# Patient Record
Sex: FEMALE | Race: WHITE | NOT HISPANIC OR LATINO | Employment: UNEMPLOYED | URBAN - METROPOLITAN AREA
[De-identification: names, ages, dates, MRNs, and addresses within clinical notes are randomized per-mention and may not be internally consistent; named-entity substitution may affect disease eponyms.]

---

## 2019-06-13 ENCOUNTER — TRANSCRIBE ORDERS (OUTPATIENT)
Dept: ADMINISTRATIVE | Facility: HOSPITAL | Age: 39
End: 2019-06-13

## 2019-06-13 DIAGNOSIS — Z12.39 SCREENING BREAST EXAMINATION: Primary | ICD-10-CM

## 2019-06-19 ENCOUNTER — APPOINTMENT (OUTPATIENT)
Dept: MAMMOGRAPHY | Facility: HOSPITAL | Age: 39
End: 2019-06-19

## 2019-06-26 ENCOUNTER — HOSPITAL ENCOUNTER (OUTPATIENT)
Dept: MAMMOGRAPHY | Facility: HOSPITAL | Age: 39
Discharge: HOME | End: 2019-06-26
Admitting: NURSE PRACTITIONER

## 2019-06-26 DIAGNOSIS — Z12.39 SCREENING BREAST EXAMINATION: ICD-10-CM

## 2019-06-26 PROCEDURE — 77067 SCR MAMMO BI INCL CAD: CPT

## 2019-06-26 PROCEDURE — 77063 BREAST TOMOSYNTHESIS BI: CPT

## 2019-07-18 ENCOUNTER — TELEPHONE (OUTPATIENT)
Dept: BARIATRICS/WEIGHT MGMT | Facility: CLINIC | Age: 39
End: 2019-07-18

## 2019-07-18 NOTE — TELEPHONE ENCOUNTER
I lm for pt to c/b @ [phone] to let us know where/who did her previous surgery so we can obtain her MR from that facility/provider, or she can call the facility and request her records be faxed to us at [phone].

## 2019-08-05 ENCOUNTER — PREP FOR SURGERY (OUTPATIENT)
Dept: OTHER | Facility: HOSPITAL | Age: 39
End: 2019-08-05

## 2019-08-05 ENCOUNTER — CONSULT (OUTPATIENT)
Dept: BARIATRICS/WEIGHT MGMT | Facility: CLINIC | Age: 39
End: 2019-08-05

## 2019-08-05 VITALS
HEART RATE: 62 BPM | SYSTOLIC BLOOD PRESSURE: 168 MMHG | DIASTOLIC BLOOD PRESSURE: 111 MMHG | WEIGHT: 293 LBS | TEMPERATURE: 97.7 F | HEIGHT: 67 IN | BODY MASS INDEX: 45.99 KG/M2

## 2019-08-05 DIAGNOSIS — E66.9 SUPER OBESE: Primary | ICD-10-CM

## 2019-08-05 PROCEDURE — 99205 OFFICE O/P NEW HI 60 MIN: CPT | Performed by: SURGERY

## 2019-08-05 RX ORDER — HYDROCHLOROTHIAZIDE 12.5 MG/1
25 CAPSULE, GELATIN COATED ORAL DAILY
COMMUNITY
End: 2019-08-12

## 2019-08-05 RX ORDER — LISINOPRIL AND HYDROCHLOROTHIAZIDE 25; 20 MG/1; MG/1
1 TABLET ORAL DAILY
Refills: 2 | COMMUNITY
Start: 2019-07-28 | End: 2021-04-21

## 2019-08-05 RX ORDER — FLUOXETINE HYDROCHLORIDE 40 MG/1
40 CAPSULE ORAL DAILY
Refills: 5 | COMMUNITY
Start: 2019-07-28 | End: 2020-12-02

## 2019-08-05 RX ORDER — GABAPENTIN 400 MG/1
400 CAPSULE ORAL
Refills: 2 | COMMUNITY
Start: 2019-07-28 | End: 2020-05-15

## 2019-08-05 RX ORDER — LURASIDONE HYDROCHLORIDE 80 MG/1
1 TABLET, FILM COATED ORAL
Refills: 1 | COMMUNITY
Start: 2019-07-01 | End: 2020-05-15

## 2019-08-05 RX ORDER — SODIUM CHLORIDE, SODIUM LACTATE, POTASSIUM CHLORIDE, CALCIUM CHLORIDE 600; 310; 30; 20 MG/100ML; MG/100ML; MG/100ML; MG/100ML
30 INJECTION, SOLUTION INTRAVENOUS CONTINUOUS
Status: CANCELLED | OUTPATIENT
Start: 2019-08-05

## 2019-08-05 NOTE — PROGRESS NOTES
Bariatric CONSULT      Patient Care Team:  Geeta Hathaway APRN as PCP - General (Nurse Practitioner)    Chief complaint abdominal pain and vomiting    Subjective  Weight regain after laparoscopic gastric band    This is a 39-year-old lady who had a LAP-BAND in 2012.  She had to have the port revised at one point due to some damage to the port.  She also got pregnant 2014 and all the fluid removed and right after pregnancy she regained a 75 pounds she has lost.  She vomits on a daily basis.  Today she weighs 355 pounds with a BMI 56.4.  She is interested in other weight loss options.  At the time of her LAP-BAND a high hernia was also repaired.  She has reflux and has to take antacids daily.  She works at [employer].  She has twins that are 4 years old    Review of Systems   All systems were reviewed and negative except for:  Gastrointestinal: positive for vomiting and some abdominal pain, refill  History  Past Medical History:   Diagnosis Date   • Breast injury    • Fibrocystic breast      No past surgical history on file.  No family history on file.  Social History     Tobacco Use   • Smoking status: Never Smoker   Substance Use Topics   • Alcohol use: No     Frequency: Never   • Drug use: No       (Not in a hospital admission)  Allergies:  Antihistamines, diphenhydramine-type    Objective     Vital Signs  Temp:  [97.7 °F (36.5 °C)] 97.7 °F (36.5 °C)  Heart Rate:  [62] 62  BP: (168)/(111) 168/111    Physical Exam:      General Appearance:    Alert, cooperative, in no acute distress   Head:    Normocephalic, without obvious abnormality, atraumatic, cutaneous horn of the left cheek   Eyes:            Lids and lashes normal, conjunctivae and sclerae normal, no   icterus, no pallor, corneas clear, PERRLA   Ears:    Ears appear intact with no abnormalities noted   Throat:   No oral lesions, no thrush, oral mucosa moist   Neck:   No adenopathy, supple, trachea midline, no thyromegaly, no   carotid bruit, no JVD    Back:     No kyphosis present, no scoliosis present, no skin lesions,      erythema or scars, no tenderness to percussion or                   palpation,   range of motion normal   Lungs:     Clear to auscultation,respirations regular, even and                  unlabored    Heart:    Regular rhythm and normal rate, normal S1 and S2, no            murmur, no gallop, no rub, no click   Chest Wall:    No abnormalities observed   Abdomen:     Normal bowel sounds, no masses, no organomegaly, soft        non-tender, non-distended, no guarding, no rebound                tenderness   Rectal:     Deferred   Extremities:  Left upper arm has decreased mobility due to some previous left elbow injury   Pulses:   Pulses palpable and equal bilaterally   Skin:   No bleeding, bruising or rash   Lymph nodes:   No palpable adenopathy   Neurologic:   Cranial nerves 2 - 12 grossly intact, sensation intact, DTR       present and equal bilaterally     Lab Results (last 24 hours)     ** No results found for the last 24 hours. **          Imaging Results (last 24 hours)     ** No results found for the last 24 hours. **          Results Review:    I reviewed the patient's new clinical results.  I reviewed the patient's new imaging results and agree with the interpretation.    Assessment/Plan       I discussed with her options of removing the band and placing an inguinal converting to a gastric sleeve or even gastric bypass.  I would like to first begin the evaluation with an EGD to rule out erosion or band slippage.      Lyn Gan MD  08/05/19  9:50 AM

## 2019-08-05 NOTE — H&P (VIEW-ONLY)
Bariatric CONSULT      Patient Care Team:  Geeta Hathaway APRN as PCP - General (Nurse Practitioner)    Chief complaint abdominal pain and vomiting    Subjective  Weight regain after laparoscopic gastric band    This is a 39-year-old lady who had a LAP-BAND in 2012.  She had to have the port revised at one point due to some damage to the port.  She also got pregnant 2014 and all the fluid removed and right after pregnancy she regained a 75 pounds she has lost.  She vomits on a daily basis.  Today she weighs 355 pounds with a BMI 56.4.  She is interested in other weight loss options.  At the time of her LAP-BAND a high hernia was also repaired.  She has reflux and has to take antacids daily.  She works at [employer].  She has twins that are 4 years old    Review of Systems   All systems were reviewed and negative except for:  Gastrointestinal: positive for vomiting and some abdominal pain, refill  History  Past Medical History:   Diagnosis Date   • Breast injury    • Fibrocystic breast      No past surgical history on file.  No family history on file.  Social History     Tobacco Use   • Smoking status: Never Smoker   Substance Use Topics   • Alcohol use: No     Frequency: Never   • Drug use: No       (Not in a hospital admission)  Allergies:  Antihistamines, diphenhydramine-type    Objective     Vital Signs  Temp:  [97.7 °F (36.5 °C)] 97.7 °F (36.5 °C)  Heart Rate:  [62] 62  BP: (168)/(111) 168/111    Physical Exam:      General Appearance:    Alert, cooperative, in no acute distress   Head:    Normocephalic, without obvious abnormality, atraumatic, cutaneous horn of the left cheek   Eyes:            Lids and lashes normal, conjunctivae and sclerae normal, no   icterus, no pallor, corneas clear, PERRLA   Ears:    Ears appear intact with no abnormalities noted   Throat:   No oral lesions, no thrush, oral mucosa moist   Neck:   No adenopathy, supple, trachea midline, no thyromegaly, no   carotid bruit, no JVD    Back:     No kyphosis present, no scoliosis present, no skin lesions,      erythema or scars, no tenderness to percussion or                   palpation,   range of motion normal   Lungs:     Clear to auscultation,respirations regular, even and                  unlabored    Heart:    Regular rhythm and normal rate, normal S1 and S2, no            murmur, no gallop, no rub, no click   Chest Wall:    No abnormalities observed   Abdomen:     Normal bowel sounds, no masses, no organomegaly, soft        non-tender, non-distended, no guarding, no rebound                tenderness   Rectal:     Deferred   Extremities:  Left upper arm has decreased mobility due to some previous left elbow injury   Pulses:   Pulses palpable and equal bilaterally   Skin:   No bleeding, bruising or rash   Lymph nodes:   No palpable adenopathy   Neurologic:   Cranial nerves 2 - 12 grossly intact, sensation intact, DTR       present and equal bilaterally     Lab Results (last 24 hours)     ** No results found for the last 24 hours. **          Imaging Results (last 24 hours)     ** No results found for the last 24 hours. **          Results Review:    I reviewed the patient's new clinical results.  I reviewed the patient's new imaging results and agree with the interpretation.    Assessment/Plan       I discussed with her options of removing the band and placing an inguinal converting to a gastric sleeve or even gastric bypass.  I would like to first begin the evaluation with an EGD to rule out erosion or band slippage.      Lyn Gan MD  08/05/19  9:50 AM

## 2019-08-07 PROBLEM — E66.9 SUPER OBESE: Status: ACTIVE | Noted: 2019-08-07

## 2019-08-14 ENCOUNTER — ANESTHESIA EVENT (OUTPATIENT)
Dept: GASTROENTEROLOGY | Facility: HOSPITAL | Age: 39
End: 2019-08-14

## 2019-08-15 ENCOUNTER — HOSPITAL ENCOUNTER (OUTPATIENT)
Facility: HOSPITAL | Age: 39
Setting detail: HOSPITAL OUTPATIENT SURGERY
Discharge: HOME | End: 2019-08-15
Attending: SURGERY | Admitting: SURGERY

## 2019-08-15 ENCOUNTER — ANESTHESIA (OUTPATIENT)
Dept: GASTROENTEROLOGY | Facility: HOSPITAL | Age: 39
End: 2019-08-15

## 2019-08-15 VITALS
RESPIRATION RATE: 17 BRPM | TEMPERATURE: 97 F | HEART RATE: 62 BPM | OXYGEN SATURATION: 100 % | WEIGHT: 293 LBS | HEIGHT: 67 IN | BODY MASS INDEX: 45.99 KG/M2 | DIASTOLIC BLOOD PRESSURE: 78 MMHG | SYSTOLIC BLOOD PRESSURE: 115 MMHG

## 2019-08-15 DIAGNOSIS — E66.9 SUPER OBESE: ICD-10-CM

## 2019-08-15 PROCEDURE — 43239 EGD BIOPSY SINGLE/MULTIPLE: CPT | Performed by: SURGERY

## 2019-08-15 PROCEDURE — 88305 TISSUE EXAM BY PATHOLOGIST: CPT | Performed by: SURGERY

## 2019-08-15 PROCEDURE — 25010000002 PROPOFOL 10 MG/ML EMULSION: Performed by: ANESTHESIOLOGY

## 2019-08-15 RX ORDER — SODIUM CHLORIDE 0.9 % (FLUSH) 0.9 %
3-10 SYRINGE (ML) INJECTION AS NEEDED
Status: DISCONTINUED | OUTPATIENT
Start: 2019-08-15 | End: 2019-08-15 | Stop reason: HOSPADM

## 2019-08-15 RX ORDER — ONDANSETRON 2 MG/ML
4 INJECTION INTRAMUSCULAR; INTRAVENOUS ONCE AS NEEDED
Status: DISCONTINUED | OUTPATIENT
Start: 2019-08-15 | End: 2019-08-15 | Stop reason: HOSPADM

## 2019-08-15 RX ORDER — SODIUM CHLORIDE 9 MG/ML
9 INJECTION, SOLUTION INTRAVENOUS CONTINUOUS PRN
Status: DISCONTINUED | OUTPATIENT
Start: 2019-08-15 | End: 2019-08-15

## 2019-08-15 RX ORDER — SODIUM CHLORIDE, SODIUM LACTATE, POTASSIUM CHLORIDE, CALCIUM CHLORIDE 600; 310; 30; 20 MG/100ML; MG/100ML; MG/100ML; MG/100ML
30 INJECTION, SOLUTION INTRAVENOUS CONTINUOUS
Status: DISCONTINUED | OUTPATIENT
Start: 2019-08-15 | End: 2019-08-15 | Stop reason: HOSPADM

## 2019-08-15 RX ORDER — PROPOFOL 10 MG/ML
VIAL (ML) INTRAVENOUS AS NEEDED
Status: DISCONTINUED | OUTPATIENT
Start: 2019-08-15 | End: 2019-08-15 | Stop reason: SURG

## 2019-08-15 RX ORDER — SODIUM CHLORIDE 0.9 % (FLUSH) 0.9 %
3 SYRINGE (ML) INJECTION EVERY 12 HOURS SCHEDULED
Status: DISCONTINUED | OUTPATIENT
Start: 2019-08-15 | End: 2019-08-15

## 2019-08-15 RX ORDER — SODIUM CHLORIDE 0.9 % (FLUSH) 0.9 %
3 SYRINGE (ML) INJECTION EVERY 12 HOURS SCHEDULED
Status: DISCONTINUED | OUTPATIENT
Start: 2019-08-15 | End: 2019-08-15 | Stop reason: HOSPADM

## 2019-08-15 RX ORDER — PROMETHAZINE HYDROCHLORIDE 25 MG/1
25 SUPPOSITORY RECTAL ONCE AS NEEDED
Status: DISCONTINUED | OUTPATIENT
Start: 2019-08-15 | End: 2019-08-15 | Stop reason: HOSPADM

## 2019-08-15 RX ORDER — SODIUM CHLORIDE 0.9 % (FLUSH) 0.9 %
3-10 SYRINGE (ML) INJECTION AS NEEDED
Status: DISCONTINUED | OUTPATIENT
Start: 2019-08-15 | End: 2019-08-15

## 2019-08-15 RX ORDER — LIDOCAINE HYDROCHLORIDE 10 MG/ML
INJECTION, SOLUTION EPIDURAL; INFILTRATION; INTRACAUDAL; PERINEURAL AS NEEDED
Status: DISCONTINUED | OUTPATIENT
Start: 2019-08-15 | End: 2019-08-15 | Stop reason: SURG

## 2019-08-15 RX ORDER — PROMETHAZINE HYDROCHLORIDE 25 MG/1
25 TABLET ORAL ONCE AS NEEDED
Status: DISCONTINUED | OUTPATIENT
Start: 2019-08-15 | End: 2019-08-15 | Stop reason: HOSPADM

## 2019-08-15 RX ORDER — SODIUM CHLORIDE 9 MG/ML
9 INJECTION, SOLUTION INTRAVENOUS CONTINUOUS PRN
Status: DISCONTINUED | OUTPATIENT
Start: 2019-08-15 | End: 2019-08-15 | Stop reason: HOSPADM

## 2019-08-15 RX ORDER — PROMETHAZINE HYDROCHLORIDE 25 MG/ML
6.25 INJECTION, SOLUTION INTRAMUSCULAR; INTRAVENOUS ONCE AS NEEDED
Status: DISCONTINUED | OUTPATIENT
Start: 2019-08-15 | End: 2019-08-15 | Stop reason: HOSPADM

## 2019-08-15 RX ADMIN — SODIUM CHLORIDE 9 ML/HR: 0.9 INJECTION, SOLUTION INTRAVENOUS at 12:03

## 2019-08-15 RX ADMIN — PROPOFOL 140 MG: 10 INJECTION, EMULSION INTRAVENOUS at 13:05

## 2019-08-15 RX ADMIN — LIDOCAINE HYDROCHLORIDE 50 MG: 10 INJECTION, SOLUTION EPIDURAL; INFILTRATION; INTRACAUDAL; PERINEURAL at 13:05

## 2019-08-15 RX ADMIN — SODIUM CHLORIDE: 0.9 INJECTION, SOLUTION INTRAVENOUS at 13:00

## 2019-08-15 NOTE — OP NOTE
Surgeon:  Lyn Gan MD  Preoperative Diagnosis: Screening for bariatric surgery    Postoperative Diagnosis: small hiatal hernia    Procedure Performed: Esophagogastroduodenoscopy with biopsy of the antrum to check for H. pylori    Indications: The patient is interested in bariatric surgery for weight loss.  This is a screening EGD.    Procedure:     The procedure, indications, preparation and potential complications were explained to the patient, who indicated understanding and signed the corresponding consent forms.  The patient was identified, taken to the endoscopy suite, and placed on the left side down decubitus position.  The patient underwent a MAC anesthesia and was appropriately monitored through the case by the anesthesia personnel with continuous pulse oximetry, blood pressure, and cardiac monitoring.  A bite block was placed.  After adequate IV sedation and using a transoral technique a lubed flexible endoscope was placed in the hypopharynx and advanced to the second portion of the duodenum without difficulty. The scope was then withdrawn back into the antrum of the stomach.  Cold forcep biopsies of the antrum were taken to rule out Helicobacter pylori.  The scope was retroflexed noting the body, fundus and cardia.  The scope was then withdrawn back into the esophagus after decompressing the stomach.  The Z line was noted and GE junction measured from the incisors.  The scope was then completely withdrawn.  The patient tolerated the procedure well and left the endoscopy suite in stable condition.  The findings are listed below.    Small hiatal hernia seen

## 2019-08-15 NOTE — DISCHARGE INSTRUCTIONS
A responsible adult should stay with you and you should rest quietly for the rest of the day.    Do not drink alcohol, drive, operate any heavy machinery or power tools or make any legal/important decisions for the next 24 hours.     Progress your diet as tolerated.  If you begin to experience severe pain, increased shortness of breath, racing heartbeat or a fever above 101 F, seek immediate medical attention. Follow up with MD as instructed.

## 2019-08-15 NOTE — ANESTHESIA POSTPROCEDURE EVALUATION
Patient: Kyle Pennington    Procedure Summary     Date:  08/15/19 Room / Location:  [facility] 3 / [facility]    Anesthesia Start:  1300 Anesthesia Stop:  1316    Procedure:  ESOPHAGOGASTRODUODENOSCOPY W/BIOPSY (N/A ) Diagnosis:       Super obese      (Super obese [E66.9])    Surgeon:  Lyn Gan MD Provider:  Bimal Castillo MD    Anesthesia Type:  MAC ASA Status:  3          Anesthesia Type: MAC  Last vitals  BP   115/78 (08/15/19 1403)   Temp   97 °F (36.1 °C) (08/15/19 1204)   Pulse   62 (08/15/19 1403)   Resp   17 (08/15/19 1403)     SpO2   100 % (08/15/19 1403)     Post Anesthesia Care and Evaluation    Patient location during evaluation: PHASE II  Patient participation: complete - patient participated  Level of consciousness: awake  Pain scale: See nurse's notes for pain score.  Pain management: adequate  Airway patency: patent  Anesthetic complications: No anesthetic complications  PONV Status: none  Cardiovascular status: acceptable  Respiratory status: acceptable  Hydration status: acceptable    Comments: Patient seen and examined postoperatively; vital signs stable; SpO2 greater than or equal to 90%; cardiopulmonary status stable; nausea/vomiting adequately controlled; pain adequately controlled; no apparent anesthesia complications; patient discharged from anesthesia care when discharge criteria were met

## 2019-08-15 NOTE — ANESTHESIA PREPROCEDURE EVALUATION
Anesthesia Evaluation     Patient summary reviewed and Nursing notes reviewed   NPO Solid Status: > 8 hours  NPO Liquid Status: > 8 hours           Airway   Mallampati: III  TM distance: >3 FB  Neck ROM: full  Possible difficult intubation  Dental      Pulmonary - normal exam   Cardiovascular - normal exam    (+) hypertension,       Neuro/Psych  GI/Hepatic/Renal/Endo    (+) morbid obesity,      Musculoskeletal     Abdominal   (+) obese,    Substance History      OB/GYN          Other                        Anesthesia Plan    ASA 3     MAC     intravenous induction   Anesthetic plan, all risks, benefits, and alternatives have been provided, discussed and informed consent has been obtained with: patient.

## 2019-08-16 LAB
LAB AP CASE REPORT: NORMAL
PATH REPORT.FINAL DX SPEC: NORMAL
PATH REPORT.GROSS SPEC: NORMAL

## 2019-09-19 ENCOUNTER — PREP FOR SURGERY (OUTPATIENT)
Dept: OTHER | Facility: HOSPITAL | Age: 39
End: 2019-09-19

## 2019-09-19 DIAGNOSIS — E66.9 SUPER OBESE: Primary | ICD-10-CM

## 2019-09-19 RX ORDER — SODIUM CHLORIDE 0.9 % (FLUSH) 0.9 %
3 SYRINGE (ML) INJECTION EVERY 12 HOURS SCHEDULED
Status: CANCELLED | OUTPATIENT
Start: 2019-09-19

## 2019-09-19 RX ORDER — CHLORHEXIDINE GLUCONATE 0.12 MG/ML
15 RINSE ORAL
Status: CANCELLED | OUTPATIENT
Start: 2019-09-19 | End: 2019-09-19

## 2019-09-19 RX ORDER — SODIUM CHLORIDE 0.9 % (FLUSH) 0.9 %
1-10 SYRINGE (ML) INJECTION AS NEEDED
Status: CANCELLED | OUTPATIENT
Start: 2019-09-19

## 2019-09-19 RX ORDER — SCOLOPAMINE TRANSDERMAL SYSTEM 1 MG/1
1 PATCH, EXTENDED RELEASE TRANSDERMAL ONCE
Status: CANCELLED | OUTPATIENT
Start: 2019-09-19 | End: 2019-09-19

## 2019-09-19 RX ORDER — ACETAMINOPHEN 160 MG/5ML
975 SOLUTION ORAL ONCE
Status: CANCELLED | OUTPATIENT
Start: 2019-09-19 | End: 2019-09-19

## 2019-09-19 RX ORDER — CEFAZOLIN SODIUM IN 0.9 % NACL 3 G/100 ML
3 INTRAVENOUS SOLUTION, PIGGYBACK (ML) INTRAVENOUS
Status: CANCELLED | OUTPATIENT
Start: 2019-09-19

## 2019-09-19 RX ORDER — PANTOPRAZOLE SODIUM 40 MG/10ML
40 INJECTION, POWDER, LYOPHILIZED, FOR SOLUTION INTRAVENOUS ONCE
Status: CANCELLED | OUTPATIENT
Start: 2019-09-19 | End: 2019-09-19

## 2019-09-19 RX ORDER — SODIUM CHLORIDE, SODIUM LACTATE, POTASSIUM CHLORIDE, CALCIUM CHLORIDE 600; 310; 30; 20 MG/100ML; MG/100ML; MG/100ML; MG/100ML
100 INJECTION, SOLUTION INTRAVENOUS CONTINUOUS
Status: CANCELLED | OUTPATIENT
Start: 2019-09-19

## 2019-09-25 ENCOUNTER — APPOINTMENT (OUTPATIENT)
Dept: GENERAL RADIOLOGY | Facility: HOSPITAL | Age: 39
End: 2019-09-25

## 2019-09-25 ENCOUNTER — HOSPITAL ENCOUNTER (EMERGENCY)
Facility: HOSPITAL | Age: 39
Discharge: HOME | End: 2019-09-25
Admitting: EMERGENCY MEDICINE

## 2019-09-25 VITALS
HEIGHT: 66 IN | TEMPERATURE: 98.2 F | DIASTOLIC BLOOD PRESSURE: 83 MMHG | HEART RATE: 64 BPM | BODY MASS INDEX: 47.09 KG/M2 | RESPIRATION RATE: 16 BRPM | SYSTOLIC BLOOD PRESSURE: 123 MMHG | WEIGHT: 293 LBS | OXYGEN SATURATION: 100 %

## 2019-09-25 DIAGNOSIS — M25.552 LEFT HIP PAIN: Primary | ICD-10-CM

## 2019-09-25 LAB
B-HCG UR QL: NEGATIVE
BACTERIA UR QL AUTO: ABNORMAL /HPF
BILIRUB UR QL STRIP: NEGATIVE
CLARITY UR: ABNORMAL
COLOR UR: YELLOW
GLUCOSE UR STRIP-MCNC: NEGATIVE MG/DL
HGB UR QL STRIP.AUTO: ABNORMAL
HYALINE CASTS UR QL AUTO: ABNORMAL /LPF
KETONES UR QL STRIP: NEGATIVE
LEUKOCYTE ESTERASE UR QL STRIP.AUTO: NEGATIVE
NITRITE UR QL STRIP: NEGATIVE
PH UR STRIP.AUTO: 5.5 [PH] (ref 5–8)
PROT UR QL STRIP: NEGATIVE
RBC # UR: ABNORMAL /HPF
REF LAB TEST METHOD: ABNORMAL
SP GR UR STRIP: 1.01 (ref 1–1.03)
SQUAMOUS #/AREA URNS HPF: ABNORMAL /HPF
UROBILINOGEN UR QL STRIP: ABNORMAL
WBC UR QL AUTO: ABNORMAL /HPF

## 2019-09-25 PROCEDURE — 25010000002 KETOROLAC TROMETHAMINE PER 15 MG: Performed by: NURSE PRACTITIONER

## 2019-09-25 PROCEDURE — 73502 X-RAY EXAM HIP UNI 2-3 VIEWS: CPT

## 2019-09-25 PROCEDURE — 99283 EMERGENCY DEPT VISIT LOW MDM: CPT

## 2019-09-25 PROCEDURE — 81001 URINALYSIS AUTO W/SCOPE: CPT | Performed by: NURSE PRACTITIONER

## 2019-09-25 PROCEDURE — 96372 THER/PROPH/DIAG INJ SC/IM: CPT

## 2019-09-25 PROCEDURE — 25010000002 METHYLPREDNISOLONE PER 125 MG: Performed by: NURSE PRACTITIONER

## 2019-09-25 PROCEDURE — 81025 URINE PREGNANCY TEST: CPT | Performed by: NURSE PRACTITIONER

## 2019-09-25 RX ORDER — KETOROLAC TROMETHAMINE 30 MG/ML
30 INJECTION, SOLUTION INTRAMUSCULAR; INTRAVENOUS ONCE
Status: COMPLETED | OUTPATIENT
Start: 2019-09-25 | End: 2019-09-25

## 2019-09-25 RX ORDER — PREDNISONE 20 MG/1
40 TABLET ORAL DAILY
Qty: 10 TABLET | Refills: 0 | Status: SHIPPED | OUTPATIENT
Start: 2019-09-25 | End: 2019-09-30

## 2019-09-25 RX ORDER — HYDROCODONE BITARTRATE AND ACETAMINOPHEN 5; 325 MG/1; MG/1
1 TABLET ORAL EVERY 6 HOURS PRN
Qty: 8 TABLET | Refills: 0 | Status: SHIPPED | OUTPATIENT
Start: 2019-09-25 | End: 2019-10-07

## 2019-09-25 RX ORDER — METHYLPREDNISOLONE SODIUM SUCCINATE 125 MG/2ML
80 INJECTION, POWDER, LYOPHILIZED, FOR SOLUTION INTRAMUSCULAR; INTRAVENOUS ONCE
Status: COMPLETED | OUTPATIENT
Start: 2019-09-25 | End: 2019-09-25

## 2019-09-25 RX ORDER — METHOCARBAMOL 750 MG/1
750 TABLET, FILM COATED ORAL ONCE
Status: COMPLETED | OUTPATIENT
Start: 2019-09-25 | End: 2019-09-25

## 2019-09-25 RX ORDER — METHOCARBAMOL 750 MG/1
750 TABLET, FILM COATED ORAL 3 TIMES DAILY
Qty: 30 TABLET | Refills: 0 | Status: SHIPPED | OUTPATIENT
Start: 2019-09-25 | End: 2020-09-01 | Stop reason: HOSPADM

## 2019-09-25 RX ADMIN — METHOCARBAMOL 750 MG: 750 TABLET, FILM COATED ORAL at 14:24

## 2019-09-25 RX ADMIN — KETOROLAC TROMETHAMINE 30 MG: 30 INJECTION, SOLUTION INTRAMUSCULAR at 14:23

## 2019-09-25 RX ADMIN — METHYLPREDNISOLONE SODIUM SUCCINATE 80 MG: 125 INJECTION, POWDER, FOR SOLUTION INTRAMUSCULAR; INTRAVENOUS at 14:24

## 2019-09-25 NOTE — ED PROVIDER NOTES
Subjective   Context: 39-year-old female patient presents the ER with complaint of pain to her left hip and mid buttock; patient reports onset of pain that began 3 days ago upon awakening; she describes it as an aching pain that radiates down her entire leg.  She reports no trauma, denies edema, she reports no muscular calf tenderness.  She denies urinary symptoms.  She reports that she has chronic pain to the middle part of her lower back.  Reports no distal numbness or tingling      Location: Left leg  Quality: aching  Timing: 3 days  Duration: constant  Aggravating:weightbearing  Alleviating:nothing reported      LNMP:  6 mo since last prior, began today            Review of Systems   Constitutional: Negative.    HENT: Negative.    Respiratory: Negative.    Cardiovascular: Negative.  Negative for leg swelling.   Gastrointestinal: Negative.    Genitourinary: Negative for decreased urine volume, difficulty urinating, dyspareunia, menstrual problem, pelvic pain and urgency.   Musculoskeletal: Positive for arthralgias and gait problem. Negative for joint swelling and myalgias.        Pain with ambulation    L hip pain   Skin: Negative.    Neurological: Negative for tremors, weakness and numbness.     Prior to Admission medications    Medication Sig Start Date End Date Taking? Authorizing Provider   FLUoxetine (PROzac) 40 MG capsule Take 40 mg by mouth Daily. 7/28/19   Darrell Tuttle MD   gabapentin (NEURONTIN) 400 MG capsule Take 400 mg by mouth every night at bedtime. 7/28/19   Darrell Tuttle MD   LATUDA 80 MG tablet Take 1 tablet by mouth every night at bedtime. 7/1/19   Darrell Tuttle MD   lisinopril-hydrochlorothiazide (PRINZIDE,ZESTORETIC) 20-12.5 MG per tablet Take 1 tablet by mouth Daily. for blood pressure. 7/28/19   Darrell Tuttle MD         Past Medical History:   Diagnosis Date   • Anxiety and depression    • Breast injury    • Fibrocystic breast    • Hypertension    • Morbid  obesity (CMS/HCC)        Allergies   Allergen Reactions   • Antihistamines, Diphenhydramine-Type Other (See Comments)     Bloody nose       Past Surgical History:   Procedure Laterality Date   • ABDOMINAL SURGERY     • ENDOSCOPY N/A 8/15/2019    Procedure: ESOPHAGOGASTRODUODENOSCOPY W/BIOPSY;  Surgeon: Lyn Gan MD;  Location: [facility] ENDOSCOPY;  Service: General   • LAPAROSCOPIC GASTRIC BANDING     • LAPAROSCOPIC TUBAL LIGATION         No family history on file.    Social History     Socioeconomic History   • Marital status:      Spouse name: Not on file   • Number of children: Not on file   • Years of education: Not on file   • Highest education level: Not on file   Tobacco Use   • Smoking status: Never Smoker   Substance and Sexual Activity   • Alcohol use: No     Frequency: Never   • Drug use: No   • Sexual activity: Defer           Objective   Physical Exam       Vital signs and triage nurse note reviewed.   Constitutional: Awake, alert; overly obese but healthy appearing 39-year-old female patient who is well-developed and well-nourished. No acute distress is noted.   HEENT: Normocephalic, atraumatic; pupils are PERRL with intact EOM; oropharynx is pink and moist without exudate or erythema.   Neck: Supple, full range of motion without pain; no cervical lymphadenopathy.   Cardiovascular: Regular rate and rhythm, normal S1-S2.   Pulmonary: Respiratory effort regular nonlabored, breath sounds clear to auscultation all fields.   Abdomen: Soft, nontender nondistended with normoactive bowel sounds; no rebound or guarding.   Musculoskeletal: Independent range of motion of all extremities with no palpable joint tenderness or edema.  Tenderness with palpation to the upper left buttock as well as the lateral left hip, no length discrepancy, tenderness continues down the lateral aspect of the leg with no overlying erythema or ecchymosis; calves are soft, nontender with negative Homans sign bilaterally   Neuro:  "Alert oriented x3, speech is clear and appropriate, GCS 15: Ambulatory with an upright steady gait; DTRs difficult to elicit bilaterally but symmetrical when elicited, Babinski negative   Skin:  Fleshtone warm, dry, intact; no erythematous or petechial rash or lesion    Procedures           ED Course        The most important  Results for orders placed or performed during the hospital encounter of 09/25/19   Pregnancy, Urine - Urine, Clean Catch   Result Value Ref Range    HCG, Urine QL Negative Negative   Urinalysis With Microscopic If Indicated (No Culture) - Urine, Clean Catch   Result Value Ref Range    Color, UA Yellow Yellow, Straw    Appearance, UA Hazy (A) Clear    pH, UA 5.5 5.0 - 8.0    Specific Gravity, UA 1.012 1.005 - 1.030    Glucose, UA Negative Negative    Ketones, UA Negative Negative    Bilirubin, UA Negative Negative    Blood, UA Large (3+) (A) Negative    Protein, UA Negative Negative    Leuk Esterase, UA Negative Negative    Nitrite, UA Negative Negative    Urobilinogen, UA 0.2 E.U./dL 0.2 - 1.0 E.U./dL   Urinalysis, Microscopic Only - Urine, Clean Catch   Result Value Ref Range    RBC, UA 0-2 (A) None Seen /HPF    WBC, UA 0-2 (A) None Seen /HPF    Bacteria, UA None Seen None Seen /HPF    Squamous Epithelial Cells, UA 0-2 None Seen, 0-2 /HPF    Hyaline Casts, UA None Seen None Seen /LPF    Methodology Automated Microscopy      Medications   ketorolac (TORADOL) injection 30 mg (30 mg Intramuscular Given 9/25/19 1423)   methylPREDNISolone sodium succinate (SOLU-Medrol) injection 80 mg (80 mg Intramuscular Given 9/25/19 1424)   methocarbamol (ROBAXIN) tablet 750 mg (750 mg Oral Given 9/25/19 1424)     /86 (BP Location: Left arm, Patient Position: Sitting)   Pulse 65   Temp 98.1 °F (36.7 °C) (Oral)   Resp 15   Ht 167.6 cm (66\")   Wt (!) 159 kg (349 lb 6.9 oz)   SpO2 97%   BMI 56.40 kg/m²   [id number]          MDM  Number of Diagnoses or Management Options     Amount and/or Complexity " "of Data Reviewed  Clinical lab tests: ordered and reviewed  Tests in the radiology section of CPT®: ordered and reviewed  Review and summarize past medical records: yes (No prior records pertinent for this visit)    Risk of Complications, Morbidity, and/or Mortality  General comments: Comorbidities:  Past medical history, allergies, current medications family history social history noted above    Previous records reviewed:    Review and summarization of lab specimens, radiology:  ED tests reviewed by me    Consultation:    Differentials: Arthritis, osteoarthritis, pathological fracture, radiculopathy, strain, sprain; this list is not all inclusive and does not constitute the entirety of considered causes        Wells' Criteria for DVT from [email or web address]  on 9/25/2019  ** All calculations should be rechecked by clinician prior to use **    RESULT SUMMARY:  0 points  Low risk group for DVT. \"Unlikely\" according to Adalberto' DVT studies.      INPUTS:  Active cancer -> 0 = No  Bedridden recently >3 days or major surgery within 12 weeks -> 0 = No  Calf swelling >3 cm compared to the other leg -> 0 = No  Collateral (nonvaricose) superficial veins present -> 0 = No  Entire leg swollen -> 0 = No  Localized tenderness along the deep venous system -> 0 = No  Pitting edema, confined to symptomatic leg -> 0 = No  Paralysis, paresis, or recent plaster immobilization of the lower extremity -> 0 = No  Previously documented DVT -> 0 = No  Alternative diagnosis to DVT as likely or more likely -> 0 = No        Repeat examination, patient was ambulatory to the room with an upright gait; she reports poor improvement with her pain.  Distal motor sensory vascular status remains intact.  Findings reviewed recommendation made for follow-up with her primary care, orthopedic evaluation will be provided, the patient is aware that persistent pain may require repeat radiology evaluation including MRI or CT; I reviewed signs and symptoms require " immediate return to ED she voiced understanding.  Patient is discharged improved stable condition ambulatory with an upright steady gait.    INSPECT report reviewed, significant for prescribed gabapentin      Final diagnoses:   Left hip pain              Rachael Ball, NP  09/25/19 153

## 2019-09-25 NOTE — DISCHARGE INSTRUCTIONS
New medications as directed; opiate medications should be used for moderate to severe pain only, there is a risk of addiction with continued and inappropriate use; additionally, opiate medications may cause constipation, please use stool softener and ensure adequate fluid intake while taking.  Impairment can occur, no alcohol, other sedative medications, operating motor vehicle or other machinery, swimming alone or other dangerous activity while taking    Use cane to the affected side for assistance with pain and partial nonweightbearing.    Follow-up with primary care physician and with orthopedic referral above for further evaluation, repeat radiology studies may be necessary for complete diagnosis

## 2019-10-10 ENCOUNTER — APPOINTMENT (OUTPATIENT)
Dept: PREADMISSION TESTING | Facility: HOSPITAL | Age: 39
End: 2019-10-10

## 2019-10-10 VITALS
HEART RATE: 63 BPM | WEIGHT: 293 LBS | HEIGHT: 67 IN | OXYGEN SATURATION: 100 % | SYSTOLIC BLOOD PRESSURE: 140 MMHG | BODY MASS INDEX: 45.99 KG/M2 | DIASTOLIC BLOOD PRESSURE: 80 MMHG

## 2019-10-10 DIAGNOSIS — E66.9 SUPER OBESE: ICD-10-CM

## 2019-10-10 LAB
ABO GROUP BLD: NORMAL
ALBUMIN SERPL-MCNC: 3.5 G/DL (ref 3.5–4.8)
ALBUMIN/GLOB SERPL: 1.3 G/DL (ref 1–1.7)
ALP SERPL-CCNC: 74 U/L (ref 32–91)
ALT SERPL W P-5'-P-CCNC: 32 U/L (ref 14–54)
ANION GAP SERPL CALCULATED.3IONS-SCNC: 12.4 MMOL/L (ref 5–15)
AST SERPL-CCNC: 21 U/L (ref 15–41)
BILIRUB SERPL-MCNC: 0.5 MG/DL (ref 0.3–1.2)
BLD GP AB SCN SERPL QL: NEGATIVE
BUN BLD-MCNC: 14 MG/DL (ref 8–20)
BUN/CREAT SERPL: 20 (ref 5.4–26.2)
CALCIUM SPEC-SCNC: 9.2 MG/DL (ref 8.9–10.3)
CHLORIDE SERPL-SCNC: 104 MMOL/L (ref 101–111)
CO2 SERPL-SCNC: 28 MMOL/L (ref 22–32)
CREAT BLD-MCNC: 0.7 MG/DL (ref 0.4–1)
DEPRECATED RDW RBC AUTO: 47.3 FL (ref 37–54)
ERYTHROCYTE [DISTWIDTH] IN BLOOD BY AUTOMATED COUNT: 19.9 % (ref 12.3–15.4)
GFR SERPL CREATININE-BSD FRML MDRD: 93 ML/MIN/1.73
GLOBULIN UR ELPH-MCNC: 2.6 GM/DL (ref 2.5–3.8)
GLUCOSE BLD-MCNC: 93 MG/DL (ref 65–99)
HCT VFR BLD AUTO: 33.4 % (ref 34–46.6)
HGB BLD-MCNC: 10.4 G/DL (ref 12–15.9)
MCH RBC QN AUTO: 20.7 PG (ref 26.6–33)
MCHC RBC AUTO-ENTMCNC: 31 G/DL (ref 31.5–35.7)
MCV RBC AUTO: 66.8 FL (ref 79–97)
PLATELET # BLD AUTO: 254 10*3/MM3 (ref 140–450)
PMV BLD AUTO: 8.4 FL (ref 6–12)
POTASSIUM BLD-SCNC: 4.4 MMOL/L (ref 3.6–5.1)
PROT SERPL-MCNC: 6.1 G/DL (ref 6.1–7.9)
RBC # BLD AUTO: 5 10*6/MM3 (ref 3.77–5.28)
RH BLD: POSITIVE
SODIUM BLD-SCNC: 140 MMOL/L (ref 136–144)
T&S EXPIRATION DATE: NORMAL
WBC NRBC COR # BLD: 5.8 10*3/MM3 (ref 3.4–10.8)

## 2019-10-10 PROCEDURE — 86900 BLOOD TYPING SEROLOGIC ABO: CPT | Performed by: SURGERY

## 2019-10-10 PROCEDURE — 80053 COMPREHEN METABOLIC PANEL: CPT | Performed by: SURGERY

## 2019-10-10 PROCEDURE — 85027 COMPLETE CBC AUTOMATED: CPT | Performed by: SURGERY

## 2019-10-10 PROCEDURE — 86850 RBC ANTIBODY SCREEN: CPT | Performed by: SURGERY

## 2019-10-10 PROCEDURE — 86901 BLOOD TYPING SEROLOGIC RH(D): CPT

## 2019-10-10 PROCEDURE — 87081 CULTURE SCREEN ONLY: CPT | Performed by: SURGERY

## 2019-10-10 PROCEDURE — 93005 ELECTROCARDIOGRAM TRACING: CPT

## 2019-10-10 PROCEDURE — 86901 BLOOD TYPING SEROLOGIC RH(D): CPT | Performed by: SURGERY

## 2019-10-10 PROCEDURE — 36415 COLL VENOUS BLD VENIPUNCTURE: CPT

## 2019-10-10 PROCEDURE — 86900 BLOOD TYPING SEROLOGIC ABO: CPT

## 2019-10-10 RX ORDER — OMEPRAZOLE 20 MG/1
20 CAPSULE, DELAYED RELEASE ORAL DAILY PRN
COMMUNITY
End: 2020-09-01 | Stop reason: HOSPADM

## 2019-10-10 RX ORDER — IBUPROFEN 200 MG
800 TABLET ORAL EVERY 6 HOURS PRN
COMMUNITY
End: 2021-10-29

## 2019-10-10 NOTE — DISCHARGE INSTRUCTIONS
Hold Ibuprofen and Multlivitamin 1 week prior to surgery,  OK to take Fluxotene, omoeprazole and Latuda day of surgery         Hold Lisinopril and Robaxin day of surgery

## 2019-10-11 LAB — MRSA SPEC QL CULT: NORMAL

## 2019-10-11 PROCEDURE — 93010 ELECTROCARDIOGRAM REPORT: CPT | Performed by: INTERNAL MEDICINE

## 2019-10-16 ENCOUNTER — ANESTHESIA EVENT (OUTPATIENT)
Dept: PERIOP | Facility: HOSPITAL | Age: 39
End: 2019-10-16

## 2019-10-17 ENCOUNTER — ANESTHESIA (OUTPATIENT)
Dept: PERIOP | Facility: HOSPITAL | Age: 39
End: 2019-10-17

## 2019-10-17 ENCOUNTER — HOSPITAL ENCOUNTER (OUTPATIENT)
Facility: HOSPITAL | Age: 39
Setting detail: HOSPITAL OUTPATIENT SURGERY
Discharge: HOME | End: 2019-10-17
Attending: SURGERY | Admitting: SURGERY

## 2019-10-17 VITALS
HEART RATE: 61 BPM | SYSTOLIC BLOOD PRESSURE: 126 MMHG | TEMPERATURE: 97.8 F | WEIGHT: 293 LBS | OXYGEN SATURATION: 95 % | BODY MASS INDEX: 47.09 KG/M2 | RESPIRATION RATE: 16 BRPM | HEIGHT: 66 IN | DIASTOLIC BLOOD PRESSURE: 80 MMHG

## 2019-10-17 LAB — B-HCG UR QL: NEGATIVE

## 2019-10-17 PROCEDURE — S0260 H&P FOR SURGERY: HCPCS | Performed by: SURGERY

## 2019-10-17 PROCEDURE — 25010000002 ONDANSETRON PER 1 MG: Performed by: NURSE ANESTHETIST, CERTIFIED REGISTERED

## 2019-10-17 PROCEDURE — 25010000002 PROPOFOL 10 MG/ML EMULSION: Performed by: NURSE ANESTHETIST, CERTIFIED REGISTERED

## 2019-10-17 PROCEDURE — 43774 LAP RMVL GASTR ADJ ALL PARTS: CPT | Performed by: SURGERY

## 2019-10-17 PROCEDURE — 25010000002 KETOROLAC TROMETHAMINE PER 15 MG: Performed by: NURSE ANESTHETIST, CERTIFIED REGISTERED

## 2019-10-17 PROCEDURE — 25010000002 MIDAZOLAM PER 1 MG: Performed by: NURSE ANESTHETIST, CERTIFIED REGISTERED

## 2019-10-17 PROCEDURE — 25010000002 DEXAMETHASONE PER 1 MG: Performed by: NURSE ANESTHETIST, CERTIFIED REGISTERED

## 2019-10-17 PROCEDURE — 25010000002 FENTANYL CITRATE (PF) 100 MCG/2ML SOLUTION: Performed by: NURSE ANESTHETIST, CERTIFIED REGISTERED

## 2019-10-17 PROCEDURE — 81025 URINE PREGNANCY TEST: CPT | Performed by: STUDENT IN AN ORGANIZED HEALTH CARE EDUCATION/TRAINING PROGRAM

## 2019-10-17 PROCEDURE — 25010000002 HYDROMORPHONE PER 4 MG: Performed by: NURSE ANESTHETIST, CERTIFIED REGISTERED

## 2019-10-17 RX ORDER — PANTOPRAZOLE SODIUM 40 MG/10ML
40 INJECTION, POWDER, LYOPHILIZED, FOR SOLUTION INTRAVENOUS ONCE
Status: COMPLETED | OUTPATIENT
Start: 2019-10-17 | End: 2019-10-17

## 2019-10-17 RX ORDER — PROMETHAZINE HYDROCHLORIDE 25 MG/1
25 TABLET ORAL ONCE AS NEEDED
Status: DISCONTINUED | OUTPATIENT
Start: 2019-10-17 | End: 2019-10-17 | Stop reason: HOSPADM

## 2019-10-17 RX ORDER — ACETAMINOPHEN 160 MG/5ML
975 SOLUTION ORAL ONCE
Status: COMPLETED | OUTPATIENT
Start: 2019-10-17 | End: 2019-10-17

## 2019-10-17 RX ORDER — PROMETHAZINE HYDROCHLORIDE 25 MG/1
25 SUPPOSITORY RECTAL ONCE AS NEEDED
Status: DISCONTINUED | OUTPATIENT
Start: 2019-10-17 | End: 2019-10-17 | Stop reason: SDUPTHER

## 2019-10-17 RX ORDER — DEXAMETHASONE SODIUM PHOSPHATE 4 MG/ML
INJECTION, SOLUTION INTRA-ARTICULAR; INTRALESIONAL; INTRAMUSCULAR; INTRAVENOUS; SOFT TISSUE AS NEEDED
Status: DISCONTINUED | OUTPATIENT
Start: 2019-10-17 | End: 2019-10-17 | Stop reason: SURG

## 2019-10-17 RX ORDER — ONDANSETRON 2 MG/ML
INJECTION INTRAMUSCULAR; INTRAVENOUS AS NEEDED
Status: DISCONTINUED | OUTPATIENT
Start: 2019-10-17 | End: 2019-10-17 | Stop reason: SURG

## 2019-10-17 RX ORDER — ONDANSETRON 2 MG/ML
4 INJECTION INTRAMUSCULAR; INTRAVENOUS ONCE AS NEEDED
Status: DISCONTINUED | OUTPATIENT
Start: 2019-10-17 | End: 2019-10-17 | Stop reason: HOSPADM

## 2019-10-17 RX ORDER — SODIUM CHLORIDE 0.9 % (FLUSH) 0.9 %
3-10 SYRINGE (ML) INJECTION AS NEEDED
Status: DISCONTINUED | OUTPATIENT
Start: 2019-10-17 | End: 2019-10-17 | Stop reason: HOSPADM

## 2019-10-17 RX ORDER — ACETAMINOPHEN 325 MG/1
650 TABLET ORAL ONCE AS NEEDED
Status: DISCONTINUED | OUTPATIENT
Start: 2019-10-17 | End: 2019-10-17 | Stop reason: HOSPADM

## 2019-10-17 RX ORDER — FENTANYL CITRATE 50 UG/ML
INJECTION, SOLUTION INTRAMUSCULAR; INTRAVENOUS AS NEEDED
Status: DISCONTINUED | OUTPATIENT
Start: 2019-10-17 | End: 2019-10-17 | Stop reason: SURG

## 2019-10-17 RX ORDER — SODIUM CHLORIDE 0.9 % (FLUSH) 0.9 %
3 SYRINGE (ML) INJECTION EVERY 12 HOURS SCHEDULED
Status: DISCONTINUED | OUTPATIENT
Start: 2019-10-17 | End: 2019-10-17 | Stop reason: HOSPADM

## 2019-10-17 RX ORDER — IPRATROPIUM BROMIDE AND ALBUTEROL SULFATE 2.5; .5 MG/3ML; MG/3ML
3 SOLUTION RESPIRATORY (INHALATION) ONCE AS NEEDED
Status: DISCONTINUED | OUTPATIENT
Start: 2019-10-17 | End: 2019-10-17 | Stop reason: HOSPADM

## 2019-10-17 RX ORDER — SCOLOPAMINE TRANSDERMAL SYSTEM 1 MG/1
1 PATCH, EXTENDED RELEASE TRANSDERMAL ONCE
Status: DISCONTINUED | OUTPATIENT
Start: 2019-10-17 | End: 2019-10-17 | Stop reason: HOSPADM

## 2019-10-17 RX ORDER — PROMETHAZINE HYDROCHLORIDE 25 MG/1
25 TABLET ORAL ONCE AS NEEDED
Status: DISCONTINUED | OUTPATIENT
Start: 2019-10-17 | End: 2019-10-17 | Stop reason: SDUPTHER

## 2019-10-17 RX ORDER — LIDOCAINE HYDROCHLORIDE 20 MG/ML
INJECTION, SOLUTION EPIDURAL; INFILTRATION; INTRACAUDAL; PERINEURAL AS NEEDED
Status: DISCONTINUED | OUTPATIENT
Start: 2019-10-17 | End: 2019-10-17 | Stop reason: SURG

## 2019-10-17 RX ORDER — PROMETHAZINE HYDROCHLORIDE 25 MG/ML
6.25 INJECTION, SOLUTION INTRAMUSCULAR; INTRAVENOUS ONCE AS NEEDED
Status: DISCONTINUED | OUTPATIENT
Start: 2019-10-17 | End: 2019-10-17 | Stop reason: HOSPADM

## 2019-10-17 RX ORDER — PHENYLEPHRINE HCL IN 0.9% NACL 0.5 MG/5ML
.5-3 SYRINGE (ML) INTRAVENOUS
Status: DISCONTINUED | OUTPATIENT
Start: 2019-10-17 | End: 2019-10-17 | Stop reason: HOSPADM

## 2019-10-17 RX ORDER — SODIUM CHLORIDE 0.9 % (FLUSH) 0.9 %
1-10 SYRINGE (ML) INJECTION AS NEEDED
Status: DISCONTINUED | OUTPATIENT
Start: 2019-10-17 | End: 2019-10-17 | Stop reason: HOSPADM

## 2019-10-17 RX ORDER — PROMETHAZINE HYDROCHLORIDE 25 MG/ML
12.5 INJECTION, SOLUTION INTRAMUSCULAR; INTRAVENOUS ONCE AS NEEDED
Status: DISCONTINUED | OUTPATIENT
Start: 2019-10-17 | End: 2019-10-17 | Stop reason: HOSPADM

## 2019-10-17 RX ORDER — ROCURONIUM BROMIDE 10 MG/ML
INJECTION, SOLUTION INTRAVENOUS AS NEEDED
Status: DISCONTINUED | OUTPATIENT
Start: 2019-10-17 | End: 2019-10-17 | Stop reason: SURG

## 2019-10-17 RX ORDER — EPHEDRINE SULFATE 50 MG/ML
5 INJECTION, SOLUTION INTRAVENOUS ONCE AS NEEDED
Status: DISCONTINUED | OUTPATIENT
Start: 2019-10-17 | End: 2019-10-17 | Stop reason: HOSPADM

## 2019-10-17 RX ORDER — CEFAZOLIN SODIUM IN 0.9 % NACL 3 G/100 ML
3 INTRAVENOUS SOLUTION, PIGGYBACK (ML) INTRAVENOUS
Status: COMPLETED | OUTPATIENT
Start: 2019-10-17 | End: 2019-10-17

## 2019-10-17 RX ORDER — PROMETHAZINE HYDROCHLORIDE 25 MG/1
25 SUPPOSITORY RECTAL ONCE AS NEEDED
Status: DISCONTINUED | OUTPATIENT
Start: 2019-10-17 | End: 2019-10-17 | Stop reason: HOSPADM

## 2019-10-17 RX ORDER — ACETAMINOPHEN 650 MG/1
650 SUPPOSITORY RECTAL ONCE AS NEEDED
Status: DISCONTINUED | OUTPATIENT
Start: 2019-10-17 | End: 2019-10-17 | Stop reason: HOSPADM

## 2019-10-17 RX ORDER — HYDRALAZINE HYDROCHLORIDE 20 MG/ML
5 INJECTION INTRAMUSCULAR; INTRAVENOUS
Status: DISCONTINUED | OUTPATIENT
Start: 2019-10-17 | End: 2019-10-17 | Stop reason: HOSPADM

## 2019-10-17 RX ORDER — HYDROMORPHONE HCL 110MG/55ML
PATIENT CONTROLLED ANALGESIA SYRINGE INTRAVENOUS AS NEEDED
Status: DISCONTINUED | OUTPATIENT
Start: 2019-10-17 | End: 2019-10-17 | Stop reason: SURG

## 2019-10-17 RX ORDER — KETOROLAC TROMETHAMINE 30 MG/ML
INJECTION, SOLUTION INTRAMUSCULAR; INTRAVENOUS AS NEEDED
Status: DISCONTINUED | OUTPATIENT
Start: 2019-10-17 | End: 2019-10-17 | Stop reason: SURG

## 2019-10-17 RX ORDER — LABETALOL HYDROCHLORIDE 5 MG/ML
5 INJECTION, SOLUTION INTRAVENOUS
Status: DISCONTINUED | OUTPATIENT
Start: 2019-10-17 | End: 2019-10-17 | Stop reason: HOSPADM

## 2019-10-17 RX ORDER — MEPERIDINE HYDROCHLORIDE 25 MG/ML
12.5 INJECTION INTRAMUSCULAR; INTRAVENOUS; SUBCUTANEOUS
Status: DISCONTINUED | OUTPATIENT
Start: 2019-10-17 | End: 2019-10-17 | Stop reason: HOSPADM

## 2019-10-17 RX ORDER — PROPOFOL 10 MG/ML
VIAL (ML) INTRAVENOUS AS NEEDED
Status: DISCONTINUED | OUTPATIENT
Start: 2019-10-17 | End: 2019-10-17 | Stop reason: SURG

## 2019-10-17 RX ORDER — SODIUM CHLORIDE, SODIUM LACTATE, POTASSIUM CHLORIDE, CALCIUM CHLORIDE 600; 310; 30; 20 MG/100ML; MG/100ML; MG/100ML; MG/100ML
9 INJECTION, SOLUTION INTRAVENOUS CONTINUOUS PRN
Status: DISCONTINUED | OUTPATIENT
Start: 2019-10-17 | End: 2019-10-17

## 2019-10-17 RX ORDER — SODIUM CHLORIDE, SODIUM LACTATE, POTASSIUM CHLORIDE, CALCIUM CHLORIDE 600; 310; 30; 20 MG/100ML; MG/100ML; MG/100ML; MG/100ML
100 INJECTION, SOLUTION INTRAVENOUS CONTINUOUS
Status: DISCONTINUED | OUTPATIENT
Start: 2019-10-17 | End: 2019-10-17 | Stop reason: HOSPADM

## 2019-10-17 RX ORDER — CHLORHEXIDINE GLUCONATE 0.12 MG/ML
15 RINSE ORAL
Status: COMPLETED | OUTPATIENT
Start: 2019-10-17 | End: 2019-10-17

## 2019-10-17 RX ORDER — MORPHINE SULFATE 4 MG/ML
2 INJECTION, SOLUTION INTRAMUSCULAR; INTRAVENOUS
Status: DISCONTINUED | OUTPATIENT
Start: 2019-10-17 | End: 2019-10-17 | Stop reason: HOSPADM

## 2019-10-17 RX ORDER — ONDANSETRON 8 MG/1
8 TABLET, ORALLY DISINTEGRATING ORAL EVERY 8 HOURS PRN
Qty: 30 TABLET | Refills: 5 | Status: SHIPPED | OUTPATIENT
Start: 2019-10-17 | End: 2020-06-02

## 2019-10-17 RX ORDER — BUPIVACAINE HYDROCHLORIDE 5 MG/ML
INJECTION, SOLUTION PERINEURAL AS NEEDED
Status: DISCONTINUED | OUTPATIENT
Start: 2019-10-17 | End: 2019-10-17 | Stop reason: HOSPADM

## 2019-10-17 RX ORDER — HYDROMORPHONE HCL 110MG/55ML
0.5 PATIENT CONTROLLED ANALGESIA SYRINGE INTRAVENOUS
Status: DISCONTINUED | OUTPATIENT
Start: 2019-10-17 | End: 2019-10-17 | Stop reason: HOSPADM

## 2019-10-17 RX ORDER — HYDROCODONE BITARTRATE AND ACETAMINOPHEN 5; 325 MG/1; MG/1
1 TABLET ORAL EVERY 6 HOURS PRN
Qty: 30 TABLET | Refills: 0 | Status: SHIPPED | OUTPATIENT
Start: 2019-10-17 | End: 2020-09-01 | Stop reason: HOSPADM

## 2019-10-17 RX ORDER — MIDAZOLAM HYDROCHLORIDE 1 MG/ML
INJECTION INTRAMUSCULAR; INTRAVENOUS AS NEEDED
Status: DISCONTINUED | OUTPATIENT
Start: 2019-10-17 | End: 2019-10-17 | Stop reason: SURG

## 2019-10-17 RX ADMIN — SODIUM CHLORIDE, SODIUM LACTATE, POTASSIUM CHLORIDE, AND CALCIUM CHLORIDE: 600; 310; 30; 20 INJECTION, SOLUTION INTRAVENOUS at 09:49

## 2019-10-17 RX ADMIN — KETOROLAC TROMETHAMINE 30 MG: 30 INJECTION, SOLUTION INTRAMUSCULAR at 09:42

## 2019-10-17 RX ADMIN — HYDROMORPHONE HYDROCHLORIDE 2 MG: 2 INJECTION INTRAMUSCULAR; INTRAVENOUS; SUBCUTANEOUS at 09:01

## 2019-10-17 RX ADMIN — CHLORHEXIDINE GLUCONATE 0.12% ORAL RINSE 15 ML: 1.2 LIQUID ORAL at 08:01

## 2019-10-17 RX ADMIN — PANTOPRAZOLE SODIUM 40 MG: 40 INJECTION, POWDER, FOR SOLUTION INTRAVENOUS at 08:01

## 2019-10-17 RX ADMIN — SCOPALAMINE 1 PATCH: 1 PATCH, EXTENDED RELEASE TRANSDERMAL at 08:00

## 2019-10-17 RX ADMIN — ONDANSETRON 4 MG: 2 INJECTION INTRAMUSCULAR; INTRAVENOUS at 09:42

## 2019-10-17 RX ADMIN — DEXAMETHASONE SODIUM PHOSPHATE 8 MG: 4 INJECTION, SOLUTION INTRAMUSCULAR; INTRAVENOUS at 09:01

## 2019-10-17 RX ADMIN — ACETAMINOPHEN 975 MG: 160 SUSPENSION ORAL at 08:02

## 2019-10-17 RX ADMIN — MIDAZOLAM 2 MG: 1 INJECTION INTRAMUSCULAR; INTRAVENOUS at 08:51

## 2019-10-17 RX ADMIN — SUGAMMADEX 200 MG: 100 INJECTION, SOLUTION INTRAVENOUS at 09:42

## 2019-10-17 RX ADMIN — SODIUM CHLORIDE, SODIUM LACTATE, POTASSIUM CHLORIDE, AND CALCIUM CHLORIDE 100 ML/HR: 600; 310; 30; 20 INJECTION, SOLUTION INTRAVENOUS at 07:44

## 2019-10-17 RX ADMIN — ROCURONIUM BROMIDE 50 MG: 10 INJECTION, SOLUTION INTRAVENOUS at 08:53

## 2019-10-17 RX ADMIN — FENTANYL CITRATE 100 MCG: 50 INJECTION, SOLUTION INTRAMUSCULAR; INTRAVENOUS at 08:51

## 2019-10-17 RX ADMIN — CEFAZOLIN 3 G: 1 INJECTION, POWDER, FOR SOLUTION INTRAMUSCULAR; INTRAVENOUS; PARENTERAL at 09:03

## 2019-10-17 RX ADMIN — LIDOCAINE HYDROCHLORIDE 100 MG: 20 INJECTION, SOLUTION EPIDURAL; INFILTRATION; INTRACAUDAL; PERINEURAL at 08:53

## 2019-10-17 RX ADMIN — PROPOFOL 200 MG: 10 INJECTION, EMULSION INTRAVENOUS at 08:53

## 2019-10-17 NOTE — DISCHARGE INSTRUCTIONS
Scopolamine skin patches  What is this medicine?  SCOPOLAMINE (skoe ROBLES a meen) is used to prevent nausea and vomiting caused by motion sickness, anesthesia and surgery.  This medicine may be used for other purposes; ask your health care provider or pharmacist if you have questions.  COMMON BRAND NAME(S): Transderm Scop  What should I tell my health care provider before I take this medicine?  They need to know if you have any of these conditions:  -are scheduled to have a gastric secretion test  -glaucoma  -heart disease  -kidney disease  -liver disease  -lung or breathing disease, like asthma  -mental illness  -prostate disease  -seizures  -stomach or intestine problems  -trouble passing urine  -an unusual or allergic reaction to scopolamine, atropine, other medicines, foods, dyes, or preservatives  -pregnant or trying to get pregnant  -breast-feeding  How should I use this medicine?  This medicine is for external use only. Follow the directions on the prescription label. Wear only 1 patch at a time. Choose an area behind the ear, that is clean, dry, hairless and free from any cuts or irritation. Wipe the area with a clean dry tissue. Peel off the plastic backing of the skin patch, trying not to touch the adhesive side with your hands. Do not cut the patches. Firmly apply to the area you have chosen, with the metallic side of the patch to the skin and the tan-colored side showing. Once firmly in place, wash your hands well with soap and water. Do not get this medicine into your eyes.  After removing the patch, wash your hands and the area behind your ear thoroughly with soap and water. The patch will still contain some medicine after use. To avoid accidental contact or ingestion by children or pets, fold the used patch in half with the sticky side together and throw away in the trash out of the reach of children and pets. If you need to use a second patch after you remove the first, place it behind the other  ear.  A special MedGuide will be given to you by the pharmacist with each prescription and refill. Be sure to read this information carefully each time.  Talk to your pediatrician regarding the use of this medicine in children. Special care may be needed.  Overdosage: If you think you have taken too much of this medicine contact a poison control center or emergency room at once.  NOTE: This medicine is only for you. Do not share this medicine with others.  What if I miss a dose?  This does not apply. This medicine is not for regular use.  What may interact with this medicine?  -alcohol  -antihistamines for allergy cough and cold  -atropine  -certain medicines for anxiety or sleep  -certain medicines for bladder problems like oxybutynin, tolterodine  -certain medicines for depression like amitriptyline, fluoxetine, sertraline  -certain medicines for stomach problems like dicyclomine, hyoscyamine  -certain medicines for Parkinson's disease like benztropine, trihexyphenidyl  -certain medicines for seizures like phenobarbital, primidone  -general anesthetics like halothane, isoflurane, methoxyflurane, propofol  -ipratropium  -local anesthetics like lidocaine, pramoxine, tetracaine  -medicines that relax muscles for surgery  -phenothiazines like chlorpromazine, mesoridazine, prochlorperazine, thioridazine  -narcotic medicines for pain  -other belladonna alkaloids  This list may not describe all possible interactions. Give your health care provider a list of all the medicines, herbs, non-prescription drugs, or dietary supplements you use. Also tell them if you smoke, drink alcohol, or use illegal drugs. Some items may interact with your medicine.  What should I watch for while using this medicine?  Limit contact with water while swimming and bathing because the patch may fall off. If the patch falls off, throw it away and put a new one behind the other ear.  You may get drowsy or dizzy. Do not drive, use machinery, or do  anything that needs mental alertness until you know how this medicine affects you. Do not stand or sit up quickly, especially if you are an older patient. This reduces the risk of dizzy or fainting spells. Alcohol may interfere with the effect of this medicine. Avoid alcoholic drinks.  Your mouth may get dry. Chewing sugarless gum or sucking hard candy, and drinking plenty of water may help. Contact your healthcare professional if the problem does not go away or is severe.  This medicine may cause dry eyes and blurred vision. If you wear contact lenses, you may feel some discomfort. Lubricating drops may help. See your healthcare professional if the problem does not go away or is severe.  If you are going to need surgery, an MRI, CT scan, or other procedure, tell your healthcare professional that you are using this medicine. You may need to remove the patch before the procedure.  What side effects may I notice from receiving this medicine?  Side effects that you should report to your doctor or health care professional as soon as possible:  -allergic reactions like skin rash, itching or hives; swelling of the face, lips, or tongue  -blurred vision  -changes in vision  -confusion  -dizziness  -eye pain  -fast, irregular heartbeat  -hallucinations, loss of contact with reality  -nausea, vomiting  -pain or trouble passing urine  -restlessness  -seizures  -skin irritation  -stomach pain  Side effects that usually do not require medical attention (report to your doctor or health care professional if they continue or are bothersome):  -drowsiness  -dry mouth  -headache  -sore throat  This list may not describe all possible side effects. Call your doctor for medical advice about side effects. You may report side effects to FDA at 0-770-FDA-5157.  Where should I keep my medicine?  Keep out of the reach of children.  Store at room temperature between 20 and 25 degrees C (68 and 77 degrees F). Keep this medicine in the foil  package until ready to use. Throw away any unused medicine after the expiration date.  NOTE: This sheet is a summary. It may not cover all possible information. If you have questions about this medicine, talk to your doctor, pharmacist, or health care provider.  © 2019 [employer] (2019-03-08 16:14:46)

## 2019-10-17 NOTE — OP NOTE
Surgeon: Lyn Gan MD    Assistant: Caty Rizo    Pre-Operative Diagnosis: Chronic dysphagia status post adjustable gastric band    Post-Operative Diagnosis: 1. Same  2. Adhesions upper abdomen 3. Abnormal gastric pouch size    Procedure Performed: Laparoscopic adjustable gastric band and port removal with lysis of adhesions    Anesthesia: GETA    Specimens: Adjustable gastric band and port inspected then discarded    EBL: less than 10 ml    Fluids:  500 ml crystalloid    Complications: None    Surgery assisted and facilitated by a certified physician assistant, who directly resulted in a decreased operative time, anesthetic time, wound exposure, and possibly of an operative wound infection, thereby decreasing patient morbidity and ultimately total expenditures.     Indications:   Patient is status post adjustable gastric band placement with chronic dysphagia who now presents for elective removal. The risks and benefits of the procedure were discussed with the patient in detail and all questions were answered.  Possibility of open, bleeding, infection, bowel injury, deep venous thrombosis, pulmonary embolism, incisional hernias, renal failure, myocardial infarction, respiratory and cardiac arrest and death were discussed. Consent was signed and witnessed.    Procedure:   Patient was identified and after informed consent was obtained the patient was taken to the operating room and placed in the supine position. Patient was given preoperative antibiotic and SCDs were placed by the nursing staff.  After adequate general anesthesia the abdomen was prepped with choroprep and draped in the usual sterile fashion. The previous incisions were marked with indelible ink. An Ioban was placed. Using a 5 mm Visiport trocar and 5 mm 0 degree laparoscope the abdomen was entered without difficulty and a pneumoperitoneum was established with good opening pressures. General laparoscopic evaluation of the abdominal cavity revealed  no injury upon entry with the trocar.  A 10 mm trocar was placed at the port site incision under direct visualization and then 2  5 mm trochars were placed in the right upper quadrant and left upper quadrant under direct visualization.  The left lobe of the liver was elevated with a grasper. The adhesions overlying the buckle of the band were taken down with the electrocautery. The buckle was identified and opened up without difficulty. The adhesions overlying the band laterally were taken down with electrocautery. Careful attention was made not to injure the stomach. Sutures were removed with the scissors. The tubing of the band was cut with the scissors and the band was pulled from around the stomach without any difficulty. The lapband was taken out of the 10-11 trocar site. The stomach was inspected prior to removing the adjustable gastric band and no evidence of erosion was noted. There was a capsule noted at the previous adjustable gastric band site and this was opened up with the scissors and taken down. The band was inspected and no discoloration was noted. The gastric band was then discarded. The trocars were then removed under direct visualization. No bleeding was noted. The pneumoperitoneum was desufflated. The incisions were then infiltrated with 0.5% marcaine with epinephrine. A total of 60 ml was used throughout the case.  The incision at the port site was then carried down to the port with electrocautery. The port was Identified and the tubing was brouht up with a right angle clamp. Sutures were cut and removed. The port was removed without difficulty. The port was inspected and no abnormalities were seen. The incision was also infiltrated with the local anesthetic. The wound was irrigated with saline and dried. No bleeding was noted. The subcutaneous tissue was reapproximated with a 2-0 vicryl in an interrupted fashion and skin incisions closed with a 4-0 Monocryl in a subcuticular fashion. The areas  were then cleaned and dried and Dermabond was placed. The patient tolerated the procedure well and left operating room in good condition. Sponges and needles were counted and reported as correct.

## 2019-10-17 NOTE — H&P
HISTORY AND PHYSICAL      Patient Care Team:  Geeta Hathaway APRN as PCP - General (Nurse Practitioner)    Chief complaint chronic dysphasia intolerance of gastric band    Subjective     Patient is a 39 y.o. female presents with chronic dysphasia and intolerance of gastric band.  EGD has been performed there is no evidence of erosion..   Review of Systems   Pertinent items are noted in HPI, all other systems reviewed and negative    History  Past Medical History:   Diagnosis Date   • Anxiety and depression    • Arthritis    • Breast injury    • Fibrocystic breast    • GERD (gastroesophageal reflux disease)    • Hypertension    • Morbid obesity (CMS/HCC)      Past Surgical History:   Procedure Laterality Date   • ABDOMINAL SURGERY     • ENDOSCOPY N/A 8/15/2019    Procedure: ESOPHAGOGASTRODUODENOSCOPY W/BIOPSY;  Surgeon: Lyn Gan MD;  Location: [facility] ENDOSCOPY;  Service: General   • LAPAROSCOPIC GASTRIC BANDING     • LAPAROSCOPIC TUBAL LIGATION       History reviewed. No pertinent family history.  Social History     Tobacco Use   • Smoking status: Former Smoker   • Smokeless tobacco: Never Used   Substance Use Topics   • Alcohol use: No     Frequency: Never   • Drug use: No     Medications Prior to Admission   Medication Sig Dispense Refill Last Dose   • omeprazole (priLOSEC) 20 MG capsule Take 20 mg by mouth Daily As Needed.   10/14/2019 at Unknown time   • FLUoxetine (PROzac) 40 MG capsule Take 40 mg by mouth Daily.  5 10/14/2019   • gabapentin (NEURONTIN) 400 MG capsule Take 400 mg by mouth every night at bedtime.  2 10/14/2019   • ibuprofen (ADVIL,MOTRIN) 200 MG tablet Take 800 mg by mouth Every 6 (Six) Hours As Needed for Mild Pain  (leg pain).   10/14/2019   • LATUDA 80 MG tablet Take 1 tablet by mouth every night at bedtime.  1 10/14/2019   • lisinopril-hydrochlorothiazide (PRINZIDE,ZESTORETIC) 20-25 MG per tablet Take 1 tablet by mouth Daily. for blood pressure.  2 10/14/2019   • methocarbamol  (ROBAXIN) 750 MG tablet Take 1 tablet by mouth 3 (Three) Times a Day. 30 tablet 0 10/14/2019   • Multiple Vitamins-Calcium (ONE-A-DAY WOMENS PO) Take 1 tablet by mouth Daily.   10/14/2019     Allergies:  Antihistamines, diphenhydramine-type    Objective     Vital Signs  Temp:  [98 °F (36.7 °C)] 98 °F (36.7 °C)  Heart Rate:  [82] 82  Resp:  [18] 18    Physical Exam:      General Appearance:    Alert, cooperative, in no acute distress   Head:    Normocephalic, without obvious abnormality, atraumatic   Eyes:            Lids and lashes normal, conjunctivae and sclerae normal, no   icterus, no pallor, corneas clear, PERRLA   Ears:    Ears appear intact with no abnormalities noted   Throat:   No oral lesions, no thrush, oral mucosa moist   Neck:   No adenopathy, supple, trachea midline, no thyromegaly, no   carotid bruit, no JVD   Back:     No kyphosis present, no scoliosis present, no skin lesions,      erythema or scars, no tenderness to percussion or                   palpation,   range of motion normal   Lungs:     Clear to auscultation,respirations regular, even and                  unlabored    Heart:    Regular rhythm and normal rate, normal S1 and S2, no            murmur, no gallop, no rub, no click   Chest Wall:    No abnormalities observed   Abdomen:     Normal bowel sounds, no masses, no organomegaly, soft        non-tender, non-distended, no guarding, no rebound                tenderness   Rectal:     Deferred   Extremities:   Moves all extremities well, no edema, no cyanosis, no             redness   Pulses:   Pulses palpable and equal bilaterally   Skin:   No bleeding, bruising or rash   Lymph nodes:   No palpable adenopathy   Neurologic:   Cranial nerves 2 - 12 grossly intact, sensation intact, DTR       present and equal bilaterally     Lab Results (last 24 hours)     Procedure Component Value Units Date/Time    Pregnancy, Urine - Urine, Clean Catch [[id number]]  (Normal) Collected:  10/17/19 0749     Specimen:  Urine, Clean Catch Updated:  10/17/19 0723     HCG, Urine QL Negative          Imaging Results (last 24 hours)     ** No results found for the last 24 hours. **          Results Review:    I reviewed the patient's new clinical results.  I reviewed the patient's new imaging results and agree with the interpretation.    Assessment/Plan       Super obese      Plan for laparoscopic removal of gastric band.  I explained that there is a risk of injury to the stomach and bleeding during the operation.      Lyn Gan MD  10/17/19  8:42 AM

## 2019-10-17 NOTE — ANESTHESIA POSTPROCEDURE EVALUATION
Patient: Kyle Pennington    Procedure Summary     Date:  10/17/19 Room / Location:  [facility] OR 10 / [facility] MAIN OR    Anesthesia Start:  0850 Anesthesia Stop:  1000    Procedure:  GASTRIC BANDING REMOVAL LAPAROSCOPIC (N/A Abdomen) Diagnosis:       Super obese      (Super obese [E66.9])    Surgeon:  Lyn Gan MD Provider:  Jose Guadalupe Dickinson MD    Anesthesia Type:  general ASA Status:  3          Anesthesia Type: general  Last vitals  BP   138/99 (10/17/19 1058)   Temp   97.2 °F (36.2 °C) (10/17/19 1000)   Pulse   65 (10/17/19 1058)   Resp   12 (10/17/19 1045)     SpO2   92 % (10/17/19 1058)     Post Anesthesia Care and Evaluation    Patient location during evaluation: PACU  Patient participation: complete - patient participated  Level of consciousness: awake  Pain scale: See nurse's notes for pain score.  Pain management: adequate  Airway patency: patent  Anesthetic complications: No anesthetic complications  PONV Status: none  Cardiovascular status: acceptable  Respiratory status: acceptable  Hydration status: acceptable    Comments: Patient seen and examined postoperatively; vital signs stable; SpO2 greater than or equal to 90%; cardiopulmonary status stable; nausea/vomiting adequately controlled; pain adequately controlled; no apparent anesthesia complications; patient discharged from anesthesia care when discharge criteria were met

## 2019-10-17 NOTE — ANESTHESIA PREPROCEDURE EVALUATION
Anesthesia Evaluation     Patient summary reviewed and Nursing notes reviewed   NPO Solid Status: > 8 hours  NPO Liquid Status: > 2 hours           Airway   Mallampati: II  TM distance: >3 FB  Neck ROM: full  No difficulty expected  Dental      Pulmonary - negative pulmonary ROS and normal exam   Cardiovascular - normal exam    (+) hypertension,       Neuro/Psych- negative ROS  GI/Hepatic/Renal/Endo    (+) morbid obesity, GERD well controlled,      Musculoskeletal (-) negative ROS    Abdominal   (+) obese,    Substance History - negative use     OB/GYN negative ob/gyn ROS         Other                        Anesthesia Plan    ASA 3     general     intravenous induction   Anesthetic plan, all risks, benefits, and alternatives have been provided, discussed and informed consent has been obtained with: patient.    Plan discussed with CRNA.

## 2019-10-17 NOTE — ANESTHESIA PROCEDURE NOTES
Airway  Urgency: elective    Date/Time: 10/17/2019 8:57 AM  Airway not difficult    General Information and Staff    Patient location during procedure: OR    Indications and Patient Condition  Indications for airway management: airway protection    Preoxygenated: yes  MILS maintained throughout  Mask difficulty assessment: 1 - vent by mask    Final Airway Details  Final airway type: endotracheal airway      Successful airway: ETT  Cuffed: yes   Successful intubation technique: direct laryngoscopy  Endotracheal tube insertion site: oral  Blade: Carmen  Blade size: 3  ETT size (mm): 7.5  Cormack-Lehane Classification: grade I - full view of glottis  Placement verified by: chest auscultation   Measured from: lips  ETT/EBT  to lips (cm): 22  Number of attempts at approach: 1  Assessment: lips, teeth, and gum same as pre-op and atraumatic intubation    Additional Comments  Left center upper front tooth noted to be chipped before induction.

## 2019-10-23 ENCOUNTER — OFFICE VISIT (OUTPATIENT)
Dept: BARIATRICS/WEIGHT MGMT | Facility: CLINIC | Age: 39
End: 2019-10-23

## 2019-10-23 VITALS
TEMPERATURE: 98.3 F | BODY MASS INDEX: 47.09 KG/M2 | DIASTOLIC BLOOD PRESSURE: 94 MMHG | SYSTOLIC BLOOD PRESSURE: 160 MMHG | WEIGHT: 293 LBS | RESPIRATION RATE: 16 BRPM | HEIGHT: 66 IN | HEART RATE: 74 BPM

## 2019-10-23 DIAGNOSIS — Z51.89 VISIT FOR WOUND CHECK: Primary | ICD-10-CM

## 2019-10-23 PROCEDURE — 99024 POSTOP FOLLOW-UP VISIT: CPT | Performed by: SURGERY

## 2019-10-23 NOTE — PROGRESS NOTES
[facility]G [facility]  [address] [place] [address]  [address] [place] [address]  Dept: [phone]  10/23/2019      Kyle Pennington.  [id number]  [id number]  1980  female      Chief Complaint   Patient presents with   • Post-op     1 wk f/u band removal       BH Post-Op Bariatric Surgery:   Kyle Pennington is status post procedure listed above  HPI:       Today's weight is (!) 154 kg (339 lb 12.8 oz) pounds, today's BMI is Body mass index is 54.85 kg/m²., she has a  loss of 4 pounds since the last visit and her weight loss since surgery is 4 pounds. The patient reports a decreased portion size and loss of appetite.      Kyle Pennington denies n/v         Supplements: protein.     Review of Systems    Patient Active Problem List   Diagnosis   • Super obese       Past Medical History:   Diagnosis Date   • Anxiety and depression    • Arthritis    • Breast injury    • Fibrocystic breast    • GERD (gastroesophageal reflux disease)    • Hypertension    • Morbid obesity (CMS/HCC)        The following portions of the patient's history were reviewed and updated as appropriate: allergies, current medications, past family history, past medical history, past social history, past surgical history and problem list.    Vitals:    10/23/19 0929   BP: 160/94   Pulse: 74   Resp: 16   Temp: 98.3 °F (36.8 °C)       Physical Exam  No erythema of incisions    Assessment:   Post-op, the patient is doing well after band removal.  .     Plan:     Encouraged patient to be sure to get plenty of lean protein per day through small frequent meals all with a protein source.   Activity restrictions: none.   Recommended patient be sure to get at least 70 grams of protein per day by eating small, frequent meals all with high lean protein choices. Be sure to limit/cut back on daily carbohydrate intake. Discussed with the patient the recommended  amount of water per day to intake- half of body weight in ounces. Reviewed vitamin requirements. Be sure to do routine exercise, 150 minutes per week minimum, including both cardio and strength training.     Instructions / Recommendations: dietary counseling recommended, recommended a daily protein intake of  grams, vitamin supplement(s) recommended, recommended exercising at least 150 minutes per week, behavior modifications recommended and instructed to call the office for concerns, questions, or problems.     The patient was instructed to follow up in 1 months .     The patient was counseled regarding. Total time spent face to face was 15 minutes and 15 minutes was spent counseling.

## 2020-01-17 DIAGNOSIS — E66.01 OBESITY, CLASS III, BMI 40-49.9 (MORBID OBESITY) (HCC): Primary | ICD-10-CM

## 2020-02-04 ENCOUNTER — HOSPITAL ENCOUNTER (OUTPATIENT)
Dept: GENERAL RADIOLOGY | Facility: HOSPITAL | Age: 40
Discharge: HOME | End: 2020-02-04
Admitting: SURGERY

## 2020-02-04 ENCOUNTER — DOCUMENTATION (OUTPATIENT)
Dept: BARIATRICS/WEIGHT MGMT | Facility: CLINIC | Age: 40
End: 2020-02-04

## 2020-02-04 ENCOUNTER — LAB (OUTPATIENT)
Dept: LAB | Facility: HOSPITAL | Age: 40
End: 2020-02-04

## 2020-02-04 DIAGNOSIS — E66.01 OBESITY, CLASS III, BMI 40-49.9 (MORBID OBESITY) (HCC): ICD-10-CM

## 2020-02-04 LAB
25(OH)D3 SERPL-MCNC: 12.7 NG/ML (ref 30–100)
ALBUMIN SERPL-MCNC: 4.1 G/DL (ref 3.5–5.2)
ALBUMIN/GLOB SERPL: 1.3 G/DL
ALP SERPL-CCNC: 95 U/L (ref 39–117)
ALT SERPL W P-5'-P-CCNC: 9 U/L (ref 1–33)
ANION GAP SERPL CALCULATED.3IONS-SCNC: 14 MMOL/L (ref 5–15)
ANISOCYTOSIS BLD QL: ABNORMAL
AST SERPL-CCNC: 11 U/L (ref 1–32)
BILIRUB SERPL-MCNC: 0.4 MG/DL (ref 0.2–1.2)
BUN BLD-MCNC: 10 MG/DL (ref 6–20)
BUN/CREAT SERPL: 14.5 (ref 7–25)
CALCIUM SPEC-SCNC: 9.2 MG/DL (ref 8.6–10.5)
CHLORIDE SERPL-SCNC: 97 MMOL/L (ref 98–107)
CHOLEST SERPL-MCNC: 155 MG/DL (ref 0–200)
CO2 SERPL-SCNC: 27 MMOL/L (ref 22–29)
CREAT BLD-MCNC: 0.69 MG/DL (ref 0.57–1)
DEPRECATED RDW RBC AUTO: 39.8 FL (ref 37–54)
ERYTHROCYTE [DISTWIDTH] IN BLOOD BY AUTOMATED COUNT: 18 % (ref 12.3–15.4)
GFR SERPL CREATININE-BSD FRML MDRD: 94 ML/MIN/1.73
GLOBULIN UR ELPH-MCNC: 3.1 GM/DL
GLUCOSE BLD-MCNC: 105 MG/DL (ref 65–99)
HCT VFR BLD AUTO: 32.3 % (ref 34–46.6)
HDLC SERPL-MCNC: 44 MG/DL (ref 40–60)
HGB BLD-MCNC: 9.5 G/DL (ref 12–15.9)
HYPOCHROMIA BLD QL: ABNORMAL
LDLC SERPL CALC-MCNC: 91 MG/DL (ref 0–100)
LDLC/HDLC SERPL: 2.07 {RATIO}
LYMPHOCYTES # BLD MANUAL: 0.89 10*3/MM3 (ref 0.7–3.1)
LYMPHOCYTES NFR BLD MANUAL: 13 % (ref 19.6–45.3)
LYMPHOCYTES NFR BLD MANUAL: 9 % (ref 5–12)
MCH RBC QN AUTO: 18.8 PG (ref 26.6–33)
MCHC RBC AUTO-ENTMCNC: 29.4 G/DL (ref 31.5–35.7)
MCV RBC AUTO: 63.8 FL (ref 79–97)
MONOCYTES # BLD AUTO: 0.61 10*3/MM3 (ref 0.1–0.9)
NEUTROPHILS # BLD AUTO: 5.18 10*3/MM3 (ref 1.7–7)
NEUTROPHILS NFR BLD MANUAL: 76 % (ref 42.7–76)
PLAT MORPH BLD: NORMAL
PLATELET # BLD AUTO: 334 10*3/MM3 (ref 140–450)
PMV BLD AUTO: 10.7 FL (ref 6–12)
POTASSIUM BLD-SCNC: 4 MMOL/L (ref 3.5–5.2)
PROT SERPL-MCNC: 7.2 G/DL (ref 6–8.5)
RBC # BLD AUTO: 5.06 10*6/MM3 (ref 3.77–5.28)
SODIUM BLD-SCNC: 138 MMOL/L (ref 136–145)
TRIGL SERPL-MCNC: 99 MG/DL (ref 0–150)
TSH SERPL DL<=0.05 MIU/L-ACNC: 2.65 UIU/ML (ref 0.27–4.2)
VARIANT LYMPHS NFR BLD MANUAL: 2 % (ref 0–5)
VLDLC SERPL-MCNC: 19.8 MG/DL (ref 5–40)
WBC MORPH BLD: NORMAL
WBC NRBC COR # BLD: 6.81 10*3/MM3 (ref 3.4–10.8)

## 2020-02-04 PROCEDURE — 85007 BL SMEAR W/DIFF WBC COUNT: CPT

## 2020-02-04 PROCEDURE — 71046 X-RAY EXAM CHEST 2 VIEWS: CPT

## 2020-02-04 PROCEDURE — 36415 COLL VENOUS BLD VENIPUNCTURE: CPT

## 2020-02-04 PROCEDURE — 80053 COMPREHEN METABOLIC PANEL: CPT

## 2020-02-04 PROCEDURE — 80061 LIPID PANEL: CPT

## 2020-02-04 PROCEDURE — 82306 VITAMIN D 25 HYDROXY: CPT

## 2020-02-04 PROCEDURE — 84443 ASSAY THYROID STIM HORMONE: CPT

## 2020-02-04 PROCEDURE — 85025 COMPLETE CBC W/AUTO DIFF WBC: CPT

## 2020-02-04 PROCEDURE — 84425 ASSAY OF VITAMIN B-1: CPT

## 2020-02-08 LAB — VIT B1 BLD-SCNC: 131.5 NMOL/L (ref 66.5–200)

## 2020-03-05 ENCOUNTER — DOCUMENTATION (OUTPATIENT)
Dept: BARIATRICS/WEIGHT MGMT | Facility: CLINIC | Age: 40
End: 2020-03-05

## 2020-03-06 RX ORDER — URSODIOL 250 MG/1
250 TABLET, FILM COATED ORAL 2 TIMES DAILY
Qty: 30 TABLET | Refills: 6 | Status: SHIPPED | OUTPATIENT
Start: 2020-03-06 | End: 2020-09-01 | Stop reason: HOSPADM

## 2020-03-10 ENCOUNTER — OFFICE VISIT (OUTPATIENT)
Dept: PSYCHIATRY | Facility: CLINIC | Age: 40
End: 2020-03-10

## 2020-03-10 DIAGNOSIS — E66.01 MORBID OBESITY (HCC): Primary | ICD-10-CM

## 2020-03-10 DIAGNOSIS — Z01.818 PRE-OPERATIVE EXAMINATION: ICD-10-CM

## 2020-03-10 PROCEDURE — 90791 PSYCH DIAGNOSTIC EVALUATION: CPT | Performed by: PSYCHIATRY & NEUROLOGY

## 2020-03-10 RX ORDER — LACTOBACILLUS COMBINATION NO.4 3B CELL
CAPSULE ORAL
COMMUNITY
Start: 2020-02-18 | End: 2020-09-01 | Stop reason: HOSPADM

## 2020-03-10 RX ORDER — GUAIFENESIN, DEXTROMETHORPHAN HBR 600; 30 MG/1; MG/1
TABLET ORAL
COMMUNITY
Start: 2020-02-18 | End: 2020-09-01 | Stop reason: HOSPADM

## 2020-03-10 NOTE — PATIENT INSTRUCTIONS

## 2020-03-10 NOTE — PROGRESS NOTES
Subjective   Kyle Pennington is a 40 y.o.y.o. female who presents today for psych eval for bariatric procedure     Chief Complaint:    Pre OP Evaluation     History of Present Illness:   The pt has a hx of mild depression, was on meds few years ago, mood is stable now   Anxiety is manageable, no panic attacks, just normal fluctuations    Depression is rated as 3/10, denied feeling hopeless/helpless  No hx of eating d/o     The pt suffered from excessive weight since childhood  Family members were on the large side as well   Weight gain was related to eating habits       This pt had appropriate reasons for seeking bariatric surgery including health issues   The pt also hopes to increase activity level with significant weight loss     The pt reported multiple weight loss attempts including  slim fast , weight watchers,the pt had lap bands and lost 100 lbs in 2012 , later on she developed complication and they had to be removed      The most successful attempt was losing 100 lbs and all past weight loss attempts have only provided temporary relief   The pt denied difficulties perceiving weight loss in the past     Healthy eating habits include 3  meals per day, eggs , yogurt, chicken, lean protein  , she does not eat red meat       Maladaptive eating habits include  occasional fast food (but making healthy choices) ,   snacking when tired, eating sweets at night or in reaction to boredom and admitted to eating as a self reward.     Currently 354 lbs ,      highest weight  354   lbs      BMI  54.9      The pt wants to get sleeve this time.    The following portions of the patient's history were reviewed and updated as appropriate: allergies, current medications, past family history, past medical history, past social history, past surgical history and problem list.    Past Medical History:   Diagnosis Date   • Anxiety and depression    • Arthritis    • Breast injury    • Fibrocystic breast    • GERD (gastroesophageal  reflux disease)    • Hypertension    • Morbid obesity (CMS/HCC)          Social History     Socioeconomic History   • Marital status:      Spouse name: Not on file   • Number of children: Not on file   • Years of education: Not on file   • Highest education level: Not on file   Tobacco Use   • Smoking status: Former Smoker     Last attempt to quit: 2000     Years since quittin.2   • Smokeless tobacco: Never Used   Substance and Sexual Activity   • Alcohol use: No     Frequency: Never   • Drug use: No   • Sexual activity: Defer       Family History   Problem Relation Age of Onset   • Hypertension Mother    • Cancer Mother    • Hypertension Father    • Heart attack Father    • Hypertension Sister    • Cancer Maternal Grandmother    • Cancer Maternal Grandfather        Past Surgical History:   Procedure Laterality Date   • ABDOMINAL SURGERY     • ENDOSCOPY N/A 8/15/2019    Procedure: ESOPHAGOGASTRODUODENOSCOPY W/BIOPSY;  Surgeon: Lyn Gan MD;  Location: [facility] ENDOSCOPY;  Service: General   • GASTRIC BANDING REMOVAL N/A 10/17/2019    Procedure: GASTRIC BANDING REMOVAL LAPAROSCOPIC;  Surgeon: Lyn Gan MD;  Location: [facility] MAIN OR;  Service: General   • LAPAROSCOPIC GASTRIC BANDING     • LAPAROSCOPIC TUBAL LIGATION         Patient Active Problem List   Diagnosis   • Morbid obesity (CMS/HCC)   • Advanced maternal age (AMA) in pregnancy   • Chronic hypertension in pregnancy   • History of laparoscopic adjustable gastric banding   • Twin pregnancy in second trimester   • BMI 50.0-59.9, adult (CMS/HCC)   • Pre-operative examination         Allergies   Allergen Reactions   • Antihistamines, Diphenhydramine-Type Other (See Comments)     Bloody nose         Current Outpatient Medications:   •  [employer] FLUTICASONE PROPIONATE 50 MCG/ACT nasal spray, SPRAY 1 SPRAY INTO EACH NOSTRIL TWICE DAILY FOR 7 DAYS THEN DECREASE TO 1 SPRAY DAILY FOR 7 DAYS, Disp: , Rfl:   •  Dextromethorphan-guaiFENesin (MUCUS RELIEF DM)   MG tablet sustained-release 12 hour, TAKE 1 TABLET BY MOUTH EVERY 12 HOURS FOR 7 DAYS, Disp: , Rfl:   •  FLUoxetine (PROzac) 40 MG capsule, Take 40 mg by mouth Daily., Disp: , Rfl: 5  •  gabapentin (NEURONTIN) 400 MG capsule, Take 400 mg by mouth every night at bedtime., Disp: , Rfl: 2  •  HYDROcodone-acetaminophen (NORCO) 5-325 MG per tablet, Take 1 tablet by mouth Every 6 (Six) Hours As Needed for Moderate Pain ., Disp: 30 tablet, Rfl: 0  •  ibuprofen (ADVIL,MOTRIN) 200 MG tablet, Take 800 mg by mouth Every 6 (Six) Hours As Needed for Mild Pain  (leg pain)., Disp: , Rfl:   •  LATUDA 80 MG tablet, Take 1 tablet by mouth every night at bedtime., Disp: , Rfl: 1  •  lisinopril-hydrochlorothiazide (PRINZIDE,ZESTORETIC) 20-25 MG per tablet, Take 1 tablet by mouth Daily. for blood pressure., Disp: , Rfl: 2  •  methocarbamol (ROBAXIN) 750 MG tablet, Take 1 tablet by mouth 3 (Three) Times a Day., Disp: 30 tablet, Rfl: 0  •  Multiple Vitamins-Calcium (ONE-A-DAY WOMENS PO), Take 1 tablet by mouth Daily., Disp: , Rfl:   •  omeprazole (priLOSEC) 20 MG capsule, Take 20 mg by mouth Daily As Needed., Disp: , Rfl:   •  ondansetron ODT (ZOFRAN ODT) 8 MG disintegrating tablet, Take 1 tablet by mouth Every 8 (Eight) Hours As Needed for Nausea or Vomiting., Disp: 30 tablet, Rfl: 5  •  ursodiol (ACTIGALL) 250 MG tablet, Take 1 tablet by mouth 2 (Two) Times a Day., Disp: 30 tablet, Rfl: 6    PAST PSYCHIATRIC HISTORY  No inpt, no SAs     PAST OUTPATIENT TREATMENT  Diagnosis treated:  Depression anxiety   Treatment Type:  meds   Prior Psychiatric Medications:  prozac - effective  And still taking it   Support Groups:  None   Sequelae Of Mental Disorder:  Emotional distress   Treatment Type:  Medication Management    Psychological ROS: negative for - anxiety, depression, disorientation, hallucinations, hostility, irritability, memory difficulties, physical abuse, sexual abuse, sleep disturbances or suicidal ideation     Mental  Status Exam:    Hygiene:   good  Cooperation:  Cooperative  Eye Contact:  Good  Psychomotor Behavior:  Appropriate  Affect:  Blunted  Hopelessness: Denies  Speech:  Normal  Thought Progress:  Goal directed and Linear  Thought Content:  Normal  Suicidal:  None  Homicidal:  None  Hallucinations:  None  Delusion:  None  Memory:  Intact  Orientation:  Person, Place, Time and Situation  Reliability:  good  Insight:  Good  Judgement:  Good  Impulse Control:  Fair  Physical/Medical Issues:  Yes          Former smoker      Diagnoses and all orders for this visit:    Morbid obesity (CMS/HCC)    BMI 50.0-59.9, adult (CMS/[facility])    Pre-operative examination         Diagnosis Plan   1. Morbid obesity (CMS/HCC)     2. BMI 50.0-59.9, adult (CMS/HCC)     3. Pre-operative examination           TREATMENT PLAN/GOALS:   No contraindications for bariatric procedure     Continue supportive psychotherapy efforts and medications as indicated. Treatment and medication options discussed during today's visit. Patient ackowledged and verbally consented to continue with current treatment plan and was educated on the importance of compliance with treatment and follow-up appointments.    MEDICATION ISSUES: meds were not prescribed during this visit     No f/u planned   PHQ-9 Depression Screening  Little interest or pleasure in doing things? 1   Feeling down, depressed, or hopeless? 1   Trouble falling or staying asleep, or sleeping too much? 0   Feeling tired or having little energy? 1   Poor appetite or overeating? 0   Feeling bad about yourself - or that you are a failure or have let yourself or your family down? 1   Trouble concentrating on things, such as reading the newspaper or watching television? 0   Moving or speaking so slowly that other people could have noticed? Or the opposite - being so fidgety or restless that you have been moving around a lot more than usual? 0   Thoughts that you would be better off dead, or of hurting yourself in  some way? 0   PHQ-9 Total Score 4   If you checked off any problems, how difficult have these problems made it for you to do your work, take care of things at home, or get along with other people? Somewhat difficult            This document has been electronically signed by Jacinta Rios MD  03/10/2020

## 2020-04-03 ENCOUNTER — TELEPHONE (OUTPATIENT)
Dept: BARIATRICS/WEIGHT MGMT | Facility: CLINIC | Age: 40
End: 2020-04-03

## 2020-04-06 ENCOUNTER — OFFICE VISIT (OUTPATIENT)
Dept: BARIATRICS/WEIGHT MGMT | Facility: CLINIC | Age: 40
End: 2020-04-06

## 2020-04-06 DIAGNOSIS — E66.01 SUPER-SUPER OBESE (HCC): Primary | ICD-10-CM

## 2020-04-06 PROBLEM — K21.9 GERD (GASTROESOPHAGEAL REFLUX DISEASE): Status: ACTIVE | Noted: 2020-04-06

## 2020-04-06 PROCEDURE — 99214 OFFICE O/P EST MOD 30 MIN: CPT | Performed by: SURGERY

## 2020-04-06 RX ORDER — OMEPRAZOLE 40 MG/1
40 CAPSULE, DELAYED RELEASE ORAL DAILY
Qty: 30 CAPSULE | Refills: 6 | Status: SHIPPED | OUTPATIENT
Start: 2020-04-06 | End: 2020-10-22

## 2020-04-06 NOTE — PROGRESS NOTES
[facility]  [address] [place] [address]  [address] [place] [address]  Dept: [phone]  4/6/2020      Kyle Pennington.  [id number]  [id number]  1980  female      Chief Complaint of weight gain; unable to maintain weight loss    History of Present Illness:   Kyle is a 40 y.o. female who presents today for evaluation, education and consultation regarding weight loss surgery. The patient is interested in the sleeve gastrectomy.     Had band removal, was causing gerd and vomiting. Since her band was removed, she has gained 6 lbs. She still has gerd.      Diet History:See dietician/RN/MA documentation for complete history of weight and diet.     Bariatric Surgery Evaluation: The patient is being seen for an initial visit for bariatric surgery evaluation.       Patient Active Problem List   Diagnosis   • Morbid obesity (CMS/HCC)   • Advanced maternal age (AMA) in pregnancy   • Chronic hypertension in pregnancy   • History of laparoscopic adjustable gastric banding   • Twin pregnancy in second trimester   • BMI 50.0-59.9, adult (CMS/HCC)   • Pre-operative examination       Past Medical History:   Diagnosis Date   • Anxiety and depression    • Arthritis    • Breast injury    • Fibrocystic breast    • GERD (gastroesophageal reflux disease)    • Hypertension    • Morbid obesity (CMS/HCC)        Past Surgical History:   Procedure Laterality Date   • ABDOMINAL SURGERY     • ENDOSCOPY N/A 8/15/2019    Procedure: ESOPHAGOGASTRODUODENOSCOPY W/BIOPSY;  Surgeon: Lyn Gan MD;  Location: [facility] ENDOSCOPY;  Service: General   • GASTRIC BANDING REMOVAL N/A 10/17/2019    Procedure: GASTRIC BANDING REMOVAL LAPAROSCOPIC;  Surgeon: Lyn Gan MD;  Location: [facility] MAIN OR;  Service: General   • LAPAROSCOPIC GASTRIC BANDING     • LAPAROSCOPIC TUBAL LIGATION         Allergies   Allergen Reactions   • Antihistamines,  Diphenhydramine-Type Other (See Comments)     Bloody nose         Current Outpatient Medications:   •  [employer] FLUTICASONE PROPIONATE 50 MCG/ACT nasal spray, SPRAY 1 SPRAY INTO EACH NOSTRIL TWICE DAILY FOR 7 DAYS THEN DECREASE TO 1 SPRAY DAILY FOR 7 DAYS, Disp: , Rfl:   •  Dextromethorphan-guaiFENesin (MUCUS RELIEF DM)  MG tablet sustained-release 12 hour, TAKE 1 TABLET BY MOUTH EVERY 12 HOURS FOR 7 DAYS, Disp: , Rfl:   •  FLUoxetine (PROzac) 40 MG capsule, Take 40 mg by mouth Daily., Disp: , Rfl: 5  •  gabapentin (NEURONTIN) 400 MG capsule, Take 400 mg by mouth every night at bedtime., Disp: , Rfl: 2  •  HYDROcodone-acetaminophen (NORCO) 5-325 MG per tablet, Take 1 tablet by mouth Every 6 (Six) Hours As Needed for Moderate Pain ., Disp: 30 tablet, Rfl: 0  •  ibuprofen (ADVIL,MOTRIN) 200 MG tablet, Take 800 mg by mouth Every 6 (Six) Hours As Needed for Mild Pain  (leg pain)., Disp: , Rfl:   •  LATUDA 80 MG tablet, Take 1 tablet by mouth every night at bedtime., Disp: , Rfl: 1  •  lisinopril-hydrochlorothiazide (PRINZIDE,ZESTORETIC) 20-25 MG per tablet, Take 1 tablet by mouth Daily. for blood pressure., Disp: , Rfl: 2  •  methocarbamol (ROBAXIN) 750 MG tablet, Take 1 tablet by mouth 3 (Three) Times a Day., Disp: 30 tablet, Rfl: 0  •  Multiple Vitamins-Calcium (ONE-A-DAY WOMENS PO), Take 1 tablet by mouth Daily., Disp: , Rfl:   •  omeprazole (priLOSEC) 20 MG capsule, Take 20 mg by mouth Daily As Needed., Disp: , Rfl:   •  ondansetron ODT (ZOFRAN ODT) 8 MG disintegrating tablet, Take 1 tablet by mouth Every 8 (Eight) Hours As Needed for Nausea or Vomiting., Disp: 30 tablet, Rfl: 5  •  ursodiol (ACTIGALL) 250 MG tablet, Take 1 tablet by mouth 2 (Two) Times a Day., Disp: 30 tablet, Rfl: 6    Social History     Socioeconomic History   • Marital status:      Spouse name: Not on file   • Number of children: Not on file   • Years of education: Not on file   • Highest education level: Not on file   Tobacco Use   •  Smoking status: Former Smoker     Last attempt to quit: 2000     Years since quittin.2   • Smokeless tobacco: Never Used   Substance and Sexual Activity   • Alcohol use: No     Frequency: Never   • Drug use: No   • Sexual activity: Defer       Family History   Problem Relation Age of Onset   • Hypertension Mother    • Cancer Mother    • Hypertension Father    • Heart attack Father    • Hypertension Sister    • Cancer Maternal Grandmother    • Cancer Maternal Grandfather          Review of Systems:  Review of Systems   Gastrointestinal: Negative.        Physical Exam:  Vital Signs:      There is no height or weight on file to calculate BMI.                Physical Exam  You have chosen to receive care through a telephone visit today. Do you consent to use a telephone visit for your medical care today? Yes       Assessment:         Kyle Pennington is a 40 y.o. year old female with medically complicated severe obesity.  , There is no height or weight on file to calculate BMI. and weight related problems.    I explained in detail the procedures that we are performing.  All of those procedures can be performed laparoscopically but there is a chance to convert to open if any technical challenges or complications do occur.  Bariatric surgery is not cosmetic surgery but rather a tool to help a patient make a life-long commitment lifestyle changes including diet, exercise, behavior changes, and taking supplemental vitamins and minerals.    Due to the patient's BMI and co-morbidities they are at a high risk for surgery and will obtain the following:  The patient has been advised that a letter of medical support and a history and physical must be obtained from her primary care physician. A psychological evaluation will be arranged for this patient. CBC, CMP, FLP, TSH and HgbA1C will be drawn. Kyle Pennington will obtain a pre-operative CXR and EKG. Kyle Pennington will obtain clearance from a cardiologist  prior to surgery.      Kyle Pennington will be set up for a pre-operative diagnostic esophagogastroduodenoscopy with biopsy for evaluation. The risks and benefits of the procedure were discussed with the patient in detail and all questions were answered.  Possibility of perforation, bleeding, aspiration, anoxic brain injury, respiratory and/or cardiac arrest and death were discussed.   She received handouts regarding, all questions were answered and informed consent was obtained.     The risks, benefits, alternatives, and potential complications of all of the procedures were explained in detail including, but not limited to death, anesthesia and medication adverse effect/DVT, pulmonary embolism, trocar site/incisional hernia, wound infection, abdominal infection, bleeding, failure to lose weight or gain weight and change in body image, metabolic complications with calcium, thiamine, vitamin B12, folate, iron, and anemia.    The patient was advised to start a high protein, low fat and low carbohydrate diet. The patient was given individualized information by our dietician along with general group information and handouts.     The patient had the Basal Metabolic Rate test performed in our office today then I reviewed the results with them including changes to help increase metabolism and a recommended daily caloric intake range- see scanned results.      The patient was given information regarding the MOLLY educational video. MOLLY is an internet based educational video which explains the surgical procedure and answers basic questions regarding the procedure. The patient was provided with instructions and a password to watch the video.    The patient was encouraged to start routine exercise including but not limited to 150 minutes per week. The patient received a resistance band along with a handout of exercises.     The consultation plan was reviewed with the patient.    The patient understands the surgical  procedures and the different surgical options that are available.  She understands the lifestyle changes that would be required after surgery and has agreed to participate in a pre-operative and postoperative weight management program.  She also expressed understanding of possible risks, had several questions answered and desires to proceed.    I think she is a good candidate for this surgery, and is interested in a sleeve gastrectomy.    No diagnosis found.    Plan:    Patient will have evaluations and follow up with bariatric dieticians and a psychologist before undergoing a multidisciplinary review of her candidacy.  We also discussed the weight loss requirement and rationale, and other program requirements.    This visit has been rescheduled as a phone visit to comply with patient safety concerns in accordance with CDC recommendations. Total time of discussion was 15 minutes.      Lyn Gan MD  4/6/2020

## 2020-04-24 ENCOUNTER — TELEPHONE (OUTPATIENT)
Dept: BARIATRICS/WEIGHT MGMT | Facility: CLINIC | Age: 40
End: 2020-04-24

## 2020-04-28 ENCOUNTER — OFFICE VISIT (OUTPATIENT)
Dept: BARIATRICS/WEIGHT MGMT | Facility: CLINIC | Age: 40
End: 2020-04-28

## 2020-04-28 DIAGNOSIS — E66.9 SUPER OBESE: Primary | ICD-10-CM

## 2020-04-28 NOTE — PROGRESS NOTES
[facility]  [address] [place] [address]  [address] [place] [address]  Dept: [phone]  4/28/2020      Kyle Pennington.  [id number]  [id number]  1980  female    Consent of SWL via phone.     Chief Complaint   Patient presents with   • Obesity   • Nutrition Counseling   • Weight Loss       The patient is here for month SWL #2 of their pre-operative supervised weight loss visit. She had a loss of 3 lbs. Has partially met following goals:1) No soda-partially met almost 100% only drinking 1 x week, 2) No eating pass 6 pm-70% of the time, 3) Met exercise 15 minutes 5 x per week. Patient is working on eliminating fast foods, fried foods, sweets and soda.  Kyle Pennington has been increasing her daily water intake. She has not been exercising: youtube videos.     Patient states they have made positive changes including exercising more.  The patient admits to be struggling with trying not to snack.    Breakfast: hard boiled egg & banana, Protein shake  Lunch: Steamed vegetables and talapia baked  Dinner: Salad with fat free ranch  Snacks: Cuties  Drinks: Water, diet tea     Snacker    Review of Systems  There were no vitals filed for this visit.  Patient Active Problem List   Diagnosis   • Morbid obesity (CMS/HCC)   • Advanced maternal age (AMA) in pregnancy   • Chronic hypertension in pregnancy   • History of laparoscopic adjustable gastric banding   • Twin pregnancy in second trimester   • BMI 50.0-59.9, adult (CMS/HCC)   • Pre-operative examination   • GERD (gastroesophageal reflux disease)     There is no height or weight on file to calculate BMI.  There were no vitals filed for this visit.  Weight change from last appointment:-3 lb   Weight change overall:-3 lb     Goals:  Exercise  20-30 minutes for  5 day by next appointment, Drink 64 fl oz of water per day by next appointment and No eating past 7  pm    Discussion/Plan:  Obesity/Morbid Obesity: Currently the patient's weight is decreasing. I reviewed the appropriate dietary choices with the patient and encouraged the necessary changes. Recommended at least 60 grams of protein per day, around 33 grams of fats and less than 100 grams of carbohydrates. Reviewed calorie intake if patient wanted to calorie count and/or had BMR. Instructed patient to drink 64 ounces of water per day and exercise a minimum of 150 minutes per week including both cardio and strength training.  Discussed with pt also eating and drinking separately stopping 30 minutes before meals and 45 minutes after meals. Discussed the option of keeping a food journal which will help patient become more aware of the nutritional value of foods so they are more prepared after surgery.    The patient was given written materials from our office for education.   I answered all of the patients questions regarding dietary changes, exercise or surgical options.  Future Appointments   Date Time Provider Department Center   4/28/2020  2:00 PM Ledy Acosta RD [facility] None     The patient will follow up in one month on.    PES:     The total time spent face to face was 20 minutes with 20 minutes spent counseling.

## 2020-04-28 NOTE — PATIENT INSTRUCTIONS
Exercise  20-30 minutes for  5 day by next appointment, Drink 64 fl oz of water per day by next appointment and No eating past 7 pm

## 2020-05-15 ENCOUNTER — HOSPITAL ENCOUNTER (EMERGENCY)
Facility: HOSPITAL | Age: 40
Discharge: HOME | End: 2020-05-15
Admitting: EMERGENCY MEDICINE

## 2020-05-15 ENCOUNTER — APPOINTMENT (OUTPATIENT)
Dept: CT IMAGING | Facility: HOSPITAL | Age: 40
End: 2020-05-15

## 2020-05-15 ENCOUNTER — APPOINTMENT (OUTPATIENT)
Dept: GENERAL RADIOLOGY | Facility: HOSPITAL | Age: 40
End: 2020-05-15

## 2020-05-15 VITALS
TEMPERATURE: 97.9 F | SYSTOLIC BLOOD PRESSURE: 119 MMHG | DIASTOLIC BLOOD PRESSURE: 72 MMHG | OXYGEN SATURATION: 98 % | BODY MASS INDEX: 45.99 KG/M2 | RESPIRATION RATE: 15 BRPM | WEIGHT: 293 LBS | HEIGHT: 67 IN | HEART RATE: 72 BPM

## 2020-05-15 DIAGNOSIS — R11.0 NAUSEA: ICD-10-CM

## 2020-05-15 DIAGNOSIS — R42 DIZZINESS: Primary | ICD-10-CM

## 2020-05-15 LAB
ANION GAP SERPL CALCULATED.3IONS-SCNC: 13 MMOL/L (ref 5–15)
ANISOCYTOSIS BLD QL: NORMAL
B-HCG UR QL: NEGATIVE
BASOPHILS # BLD AUTO: 0.1 10*3/MM3 (ref 0–0.2)
BASOPHILS NFR BLD AUTO: 0.9 % (ref 0–1.5)
BUN BLD-MCNC: 21 MG/DL (ref 6–20)
BUN/CREAT SERPL: 29.2 (ref 7–25)
CALCIUM SPEC-SCNC: 9.4 MG/DL (ref 8.6–10.5)
CHLORIDE SERPL-SCNC: 99 MMOL/L (ref 98–107)
CO2 SERPL-SCNC: 29 MMOL/L (ref 22–29)
CREAT BLD-MCNC: 0.72 MG/DL (ref 0.57–1)
DEPRECATED RDW RBC AUTO: 44.6 FL (ref 37–54)
EOSINOPHIL # BLD AUTO: 0.1 10*3/MM3 (ref 0–0.4)
EOSINOPHIL NFR BLD AUTO: 1.2 % (ref 0.3–6.2)
ERYTHROCYTE [DISTWIDTH] IN BLOOD BY AUTOMATED COUNT: 20.4 % (ref 12.3–15.4)
GFR SERPL CREATININE-BSD FRML MDRD: 89 ML/MIN/1.73
GLUCOSE BLD-MCNC: 106 MG/DL (ref 65–99)
HCT VFR BLD AUTO: 32.1 % (ref 34–46.6)
HGB BLD-MCNC: 10.2 G/DL (ref 12–15.9)
LARGE PLATELETS: NORMAL
LYMPHOCYTES # BLD AUTO: 1.6 10*3/MM3 (ref 0.7–3.1)
LYMPHOCYTES NFR BLD AUTO: 24.2 % (ref 19.6–45.3)
MAGNESIUM SERPL-MCNC: 2 MG/DL (ref 1.6–2.6)
MCH RBC QN AUTO: 19.3 PG (ref 26.6–33)
MCHC RBC AUTO-ENTMCNC: 31.9 G/DL (ref 31.5–35.7)
MCV RBC AUTO: 60.4 FL (ref 79–97)
MICROCYTES BLD QL: NORMAL
MONOCYTES # BLD AUTO: 0.5 10*3/MM3 (ref 0.1–0.9)
MONOCYTES NFR BLD AUTO: 7.4 % (ref 5–12)
NEUTROPHILS # BLD AUTO: 4.3 10*3/MM3 (ref 1.7–7)
NEUTROPHILS NFR BLD AUTO: 66.3 % (ref 42.7–76)
NRBC BLD AUTO-RTO: 0 /100 WBC (ref 0–0.2)
PLATELET # BLD AUTO: 277 10*3/MM3 (ref 140–450)
PMV BLD AUTO: 8.6 FL (ref 6–12)
POIKILOCYTOSIS BLD QL SMEAR: NORMAL
POTASSIUM BLD-SCNC: 3.5 MMOL/L (ref 3.5–5.2)
RBC # BLD AUTO: 5.32 10*6/MM3 (ref 3.77–5.28)
SMALL PLATELETS BLD QL SMEAR: ADEQUATE
SODIUM BLD-SCNC: 141 MMOL/L (ref 136–145)
TROPONIN T SERPL-MCNC: <0.01 NG/ML (ref 0–0.03)
WBC MORPH BLD: NORMAL
WBC NRBC COR # BLD: 6.5 10*3/MM3 (ref 3.4–10.8)

## 2020-05-15 PROCEDURE — 81025 URINE PREGNANCY TEST: CPT | Performed by: PHYSICIAN ASSISTANT

## 2020-05-15 PROCEDURE — 70450 CT HEAD/BRAIN W/O DYE: CPT

## 2020-05-15 PROCEDURE — 93005 ELECTROCARDIOGRAM TRACING: CPT

## 2020-05-15 PROCEDURE — 85007 BL SMEAR W/DIFF WBC COUNT: CPT | Performed by: PHYSICIAN ASSISTANT

## 2020-05-15 PROCEDURE — 99284 EMERGENCY DEPT VISIT MOD MDM: CPT

## 2020-05-15 PROCEDURE — 85025 COMPLETE CBC W/AUTO DIFF WBC: CPT | Performed by: PHYSICIAN ASSISTANT

## 2020-05-15 PROCEDURE — 80048 BASIC METABOLIC PNL TOTAL CA: CPT | Performed by: PHYSICIAN ASSISTANT

## 2020-05-15 PROCEDURE — 83735 ASSAY OF MAGNESIUM: CPT | Performed by: PHYSICIAN ASSISTANT

## 2020-05-15 PROCEDURE — 84484 ASSAY OF TROPONIN QUANT: CPT | Performed by: PHYSICIAN ASSISTANT

## 2020-05-15 PROCEDURE — 96374 THER/PROPH/DIAG INJ IV PUSH: CPT

## 2020-05-15 PROCEDURE — 71045 X-RAY EXAM CHEST 1 VIEW: CPT

## 2020-05-15 PROCEDURE — 25010000002 ONDANSETRON PER 1 MG: Performed by: PHYSICIAN ASSISTANT

## 2020-05-15 RX ORDER — MECLIZINE HYDROCHLORIDE 25 MG/1
25 TABLET ORAL ONCE
Status: COMPLETED | OUTPATIENT
Start: 2020-05-15 | End: 2020-05-15

## 2020-05-15 RX ORDER — SODIUM CHLORIDE 0.9 % (FLUSH) 0.9 %
10 SYRINGE (ML) INJECTION AS NEEDED
Status: DISCONTINUED | OUTPATIENT
Start: 2020-05-15 | End: 2020-05-15 | Stop reason: HOSPADM

## 2020-05-15 RX ORDER — ONDANSETRON 2 MG/ML
4 INJECTION INTRAMUSCULAR; INTRAVENOUS ONCE
Status: COMPLETED | OUTPATIENT
Start: 2020-05-15 | End: 2020-05-15

## 2020-05-15 RX ORDER — MECLIZINE HYDROCHLORIDE 25 MG/1
TABLET ORAL
Status: COMPLETED
Start: 2020-05-15 | End: 2020-05-15

## 2020-05-15 RX ORDER — MECLIZINE HYDROCHLORIDE 25 MG/1
25 TABLET ORAL 3 TIMES DAILY PRN
Qty: 15 TABLET | Refills: 0 | Status: SHIPPED | OUTPATIENT
Start: 2020-05-15 | End: 2020-12-02

## 2020-05-15 RX ADMIN — MECLIZINE HYDROCHLORIDE 25 MG: 25 TABLET ORAL at 15:30

## 2020-05-15 RX ADMIN — SODIUM CHLORIDE 1000 ML: 900 INJECTION, SOLUTION INTRAVENOUS at 15:30

## 2020-05-15 RX ADMIN — ONDANSETRON 4 MG: 2 INJECTION INTRAMUSCULAR; INTRAVENOUS at 15:30

## 2020-05-15 NOTE — ED PROVIDER NOTES
Subjective   History of Present Illness  Patient is a 40-year-old female presents with complaints of intermittent dizziness over the past week.  Patient reports this is mostly with position change scribes as the room spinning around her she denies any syncopal episodes.  Patient does report some associated nausea and one episode of vomiting yesterday.  Denies any hematemesis.  Patient also denies any black or bloody stools or abdominal pain.  Denies any chest pain shortness of breath recent cough or illness.  She denies any headache, one-sided numbness weakness slurred speech or facial droop.  Patient denies any recent medication changes besides being started on iron.  Patient states she is taken no medications for symptoms denies any other alleviating or exacerbating factors.  Patient denies any no sick contacts or heart palpitations.  Review of Systems   Constitutional: Negative.    HENT: Negative.    Respiratory: Negative.    Gastrointestinal: Negative.    Skin: Negative.    Neurological: Positive for dizziness. Negative for tremors, seizures, syncope, facial asymmetry, speech difficulty, weakness, light-headedness, numbness and headaches.       Past Medical History:   Diagnosis Date   • Anxiety and depression    • Arthritis    • Breast injury    • Fibrocystic breast    • GERD (gastroesophageal reflux disease)    • Hypertension    • Morbid obesity (CMS/HCC)        Allergies   Allergen Reactions   • Antihistamines, Diphenhydramine-Type Other (See Comments)     Bloody nose       Past Surgical History:   Procedure Laterality Date   • ABDOMINAL SURGERY     • ENDOSCOPY N/A 8/15/2019    Procedure: ESOPHAGOGASTRODUODENOSCOPY W/BIOPSY;  Surgeon: Lyn Gan MD;  Location: [facility] ENDOSCOPY;  Service: General   • GASTRIC BANDING REMOVAL N/A 10/17/2019    Procedure: GASTRIC BANDING REMOVAL LAPAROSCOPIC;  Surgeon: Lyn Gan MD;  Location: [facility] MAIN OR;  Service: General   • LAPAROSCOPIC GASTRIC BANDING     •  LAPAROSCOPIC TUBAL LIGATION         Family History   Problem Relation Age of Onset   • Hypertension Mother    • Cancer Mother    • Hypertension Father    • Heart attack Father    • Hypertension Sister    • Cancer Maternal Grandmother    • Cancer Maternal Grandfather        Social History     Socioeconomic History   • Marital status:      Spouse name: Not on file   • Number of children: Not on file   • Years of education: Not on file   • Highest education level: Not on file   Tobacco Use   • Smoking status: Former Smoker     Last attempt to quit: 2000     Years since quittin.3   • Smokeless tobacco: Never Used   Substance and Sexual Activity   • Alcohol use: No     Frequency: Never   • Drug use: No   • Sexual activity: Defer           Objective   Physical Exam   Constitutional: She is oriented to person, place, and time. She appears well-developed and well-nourished. No distress.   HENT:   Head: Normocephalic and atraumatic.   Right Ear: No drainage, swelling or tenderness. No foreign bodies. No mastoid tenderness. Tympanic membrane is not perforated, not erythematous, not retracted and not bulging. No middle ear effusion.   Left Ear: No drainage, swelling or tenderness. No foreign bodies. No mastoid tenderness. Tympanic membrane is not perforated, not erythematous, not retracted and not bulging.  No middle ear effusion.   Eyes: Pupils are equal, round, and reactive to light. EOM are normal.   Cardiovascular: Normal rate, regular rhythm, normal heart sounds and intact distal pulses. Exam reveals no gallop and no friction rub.   No murmur heard.  Pulmonary/Chest: Effort normal. No stridor. She has no wheezes. She has no rales.   Neurological: She is alert and oriented to person, place, and time. No cranial nerve deficit or sensory deficit. GCS eye subscore is 4. GCS verbal subscore is 5. GCS motor subscore is 6.   Normal and equal sensation strength throughout moving all extremities freely.   Skin: Skin  "is warm. Capillary refill takes less than 2 seconds. No rash noted. She is not diaphoretic. No erythema.   Psychiatric: She has a normal mood and affect. Her behavior is normal.   Nursing note and vitals reviewed.      Procedures           ED Course    /81 (Patient Position: Standing)   Pulse 98   Temp 98.1 °F (36.7 °C) (Oral)   Resp 16   Ht 168.9 cm (66.5\")   Wt (!) 160 kg (352 lb 8.3 oz)   LMP 05/13/2020   SpO2 100%   BMI 56.05 kg/m²   Medications   sodium chloride 0.9 % flush 10 mL (has no administration in time range)   sodium chloride 0.9 % bolus 1,000 mL (1,000 mL Intravenous New Bag 5/15/20 1530)   ondansetron (ZOFRAN) injection 4 mg (4 mg Intravenous Given 5/15/20 1530)   meclizine (ANTIVERT) tablet 25 mg (25 mg Oral Given 5/15/20 1530)     Labs Reviewed   BASIC METABOLIC PANEL - Abnormal; Notable for the following components:       Result Value    Glucose 106 (*)     BUN 21 (*)     BUN/Creatinine Ratio 29.2 (*)     All other components within normal limits    Narrative:     GFR Normal >60  Chronic Kidney Disease <60  Kidney Failure <15     CBC WITH AUTO DIFFERENTIAL - Abnormal; Notable for the following components:    RBC 5.32 (*)     Hemoglobin 10.2 (*)     Hematocrit 32.1 (*)     MCV 60.4 (*)     MCH 19.3 (*)     RDW 20.4 (*)     All other components within normal limits    Narrative:     Appended report. These results have been appended to a previously verified report.   PREGNANCY, URINE - Normal   TROPONIN (IN-HOUSE) - Normal    Narrative:     Troponin T Reference Range:  <= 0.03 ng/mL-   Negative for AMI  >0.03 ng/mL-     Abnormal for myocardial necrosis.  Clinicians would have to utilize clinical acumen, EKG, Troponin and serial changes to determine if it is an Acute Myocardial Infarction or myocardial injury due to an underlying chronic condition.       Results may be falsely decreased if patient taking Biotin.     MAGNESIUM - Normal   SCAN SLIDE    Narrative:     Slide Reviewed     CBC " AND DIFFERENTIAL    Narrative:     The following orders were created for panel order CBC & Differential.  Procedure                               Abnormality         Status                     ---------                               -----------         ------                     CBC Auto Differential[[id number]]        Abnormal            Final result                 Please view results for these tests on the individual orders.     Ct Head Without Contrast    Result Date: 5/15/2020  No acute intracranial abnormality.  Electronically Signed By-Susanne Olmstead On:5/15/2020 4:56 PM This report was finalized on [id number] by  Susanne Olmstead, .    Xr Chest 1 View    Result Date: 5/15/2020  No acute cardiopulmonary abnormality.  Electronically Signed By-Mateo Lauren On:5/15/2020 3:38 PM This report was finalized on [id number] by  Mateo Lauren, .                                           MDM  Number of Diagnoses or Management Options  Diagnosis management comments: Chart Review:  Comorbidity: Anxiety, depression, arthritis, GERD, hypertension, obesity  Differentials: Electrolyte abnormality, tumor, CVA, acute coronary syndrome, dissection      ;this list is not all inclusive and does not constitute the entirety of considered causes  ECG: Interpreted by myself and Dr. bean shows sinus rhythm rate 75 probable left ventricular hypertrophy with secondary repolarization abnormality this EKG was reviewed from 10/10/2019  Labs: Magnesium 2.  Troponin within normal limits.  Urine pregnancy negative.  BMP shows glucose 106 BUN 21 creatinine 0.72 sodium 141 potassium 3.5.  CBC shows WBC 6.5 hemoglobin 10.2 hematocrit 32.1 platelets 277 3 months ago hemoglobin 9.5 hematocrit 32.3  Imaging: Was interpreted by physician and reviewed by myself:  Ct Head Without Contrast  Result Date: 5/15/2020  No acute intracranial abnormality.  Electronically Signed ByChris Olmstead On:5/15/2020 4:56 PM This report was finalized on  [id number] by  Susanne Olmstead, .    Xr Chest 1 View  Result Date: 5/15/2020  No acute cardiopulmonary abnormality.  Electronically Signed By-Mateo Lauren On:5/15/2020 3:38 PM This report was finalized on [id number] by  Mateo Lauren, .    Disposition/Treatment:  Appropriate PPE was worn during exam and throughout all encounters with the patient.  Orthostatics unremarkable.  Patient was afebrile and appeared nontoxic she was given fluids and meclizine upon reassessment patient reports much improvement of her dizziness lab results were fairly unremarkable troponin and EKG showed no acute findings chest x-ray was unremarkable head CT showed no acute findings as described above patient symptoms seem to be secondary to vertigo patient will be given meclizine for home.  Physical exam including ear exam was unremarkable.  She was advised to follow-up with [facility] for further evaluation of her dizziness continues.         Amount and/or Complexity of Data Reviewed  Clinical lab tests: reviewed  Tests in the radiology section of CPT®: reviewed  Tests in the medicine section of CPT®: reviewed        Final diagnoses:   Dizziness   Nausea            Deja Cardenas PA  05/15/20 1145

## 2020-05-15 NOTE — DISCHARGE INSTRUCTIONS
Take meclizine as needed for nausea and dizziness.  Drink plenty of clear fluids over the next 2 to 3 days.    Follow-up with your primary care provider in 3-5 days.  If you do not have a primary care provider call [phone] for help in finding one, or you may follow up with [facility] at [phone].    If your dizziness continues follow-up with [facility] as listed below for further evaluation.    Return to ED for any new or worsening symptoms

## 2020-05-26 ENCOUNTER — DOCUMENTATION (OUTPATIENT)
Dept: BARIATRICS/WEIGHT MGMT | Facility: CLINIC | Age: 40
End: 2020-05-26

## 2020-05-26 ENCOUNTER — OFFICE VISIT (OUTPATIENT)
Dept: BARIATRICS/WEIGHT MGMT | Facility: CLINIC | Age: 40
End: 2020-05-26

## 2020-05-26 VITALS
SYSTOLIC BLOOD PRESSURE: 121 MMHG | WEIGHT: 293 LBS | BODY MASS INDEX: 45.99 KG/M2 | DIASTOLIC BLOOD PRESSURE: 86 MMHG | HEART RATE: 102 BPM | HEIGHT: 67 IN

## 2020-05-26 VITALS — BODY MASS INDEX: 55.49 KG/M2 | WEIGHT: 293 LBS

## 2020-05-26 DIAGNOSIS — E66.9 SUPER OBESE: Primary | ICD-10-CM

## 2020-05-26 NOTE — PATIENT INSTRUCTIONS
Exercise  20-30 minutes for  5 day by next appointment, Journal food & exercise 5 times per week by next appointment and Planning a light snack if having a light.

## 2020-06-02 ENCOUNTER — HOSPITAL ENCOUNTER (EMERGENCY)
Facility: HOSPITAL | Age: 40
Discharge: HOME | End: 2020-06-02
Admitting: EMERGENCY MEDICINE

## 2020-06-02 VITALS
SYSTOLIC BLOOD PRESSURE: 126 MMHG | HEART RATE: 83 BPM | OXYGEN SATURATION: 97 % | TEMPERATURE: 98.1 F | DIASTOLIC BLOOD PRESSURE: 70 MMHG | WEIGHT: 293 LBS | BODY MASS INDEX: 47.09 KG/M2 | RESPIRATION RATE: 21 BRPM | HEIGHT: 66 IN

## 2020-06-02 DIAGNOSIS — R11.2 NON-INTRACTABLE VOMITING WITH NAUSEA, UNSPECIFIED VOMITING TYPE: ICD-10-CM

## 2020-06-02 DIAGNOSIS — R42 DIZZY: Primary | ICD-10-CM

## 2020-06-02 LAB
ALBUMIN SERPL-MCNC: 4.1 G/DL (ref 3.5–5.2)
ALBUMIN/GLOB SERPL: 1.2 G/DL
ALP SERPL-CCNC: 145 U/L (ref 39–117)
ALT SERPL W P-5'-P-CCNC: 27 U/L (ref 1–33)
ANION GAP SERPL CALCULATED.3IONS-SCNC: 10 MMOL/L (ref 5–15)
ANISOCYTOSIS BLD QL: NORMAL
AST SERPL-CCNC: 21 U/L (ref 1–32)
BASOPHILS # BLD AUTO: 0 10*3/MM3 (ref 0–0.2)
BASOPHILS NFR BLD AUTO: 0.5 % (ref 0–1.5)
BILIRUB SERPL-MCNC: 0.2 MG/DL (ref 0.2–1.2)
BUN BLD-MCNC: 17 MG/DL (ref 6–20)
BUN/CREAT SERPL: 23 (ref 7–25)
CALCIUM SPEC-SCNC: 9.6 MG/DL (ref 8.6–10.5)
CHLORIDE SERPL-SCNC: 100 MMOL/L (ref 98–107)
CO2 SERPL-SCNC: 27 MMOL/L (ref 22–29)
CREAT BLD-MCNC: 0.74 MG/DL (ref 0.57–1)
DEPRECATED RDW RBC AUTO: 47.3 FL (ref 37–54)
EOSINOPHIL # BLD AUTO: 0.1 10*3/MM3 (ref 0–0.4)
EOSINOPHIL NFR BLD AUTO: 0.9 % (ref 0.3–6.2)
ERYTHROCYTE [DISTWIDTH] IN BLOOD BY AUTOMATED COUNT: 21.4 % (ref 12.3–15.4)
GFR SERPL CREATININE-BSD FRML MDRD: 86 ML/MIN/1.73
GLOBULIN UR ELPH-MCNC: 3.3 GM/DL
GLUCOSE BLD-MCNC: 142 MG/DL (ref 65–99)
HCT VFR BLD AUTO: 33.1 % (ref 34–46.6)
HGB BLD-MCNC: 10.3 G/DL (ref 12–15.9)
HOLD SPECIMEN: NORMAL
HOLD SPECIMEN: NORMAL
LARGE PLATELETS: NORMAL
LIPASE SERPL-CCNC: 28 U/L (ref 13–60)
LYMPHOCYTES # BLD AUTO: 1.2 10*3/MM3 (ref 0.7–3.1)
LYMPHOCYTES NFR BLD AUTO: 12.9 % (ref 19.6–45.3)
MACROCYTES BLD QL SMEAR: NORMAL
MCH RBC QN AUTO: 19.3 PG (ref 26.6–33)
MCHC RBC AUTO-ENTMCNC: 31.2 G/DL (ref 31.5–35.7)
MCV RBC AUTO: 62 FL (ref 79–97)
MONOCYTES # BLD AUTO: 0.4 10*3/MM3 (ref 0.1–0.9)
MONOCYTES NFR BLD AUTO: 4.7 % (ref 5–12)
NEUTROPHILS # BLD AUTO: 7.4 10*3/MM3 (ref 1.7–7)
NEUTROPHILS NFR BLD AUTO: 81 % (ref 42.7–76)
NRBC BLD AUTO-RTO: 0.1 /100 WBC (ref 0–0.2)
PLATELET # BLD AUTO: 391 10*3/MM3 (ref 140–450)
PMV BLD AUTO: 8.5 FL (ref 6–12)
POIKILOCYTOSIS BLD QL SMEAR: NORMAL
POTASSIUM BLD-SCNC: 4 MMOL/L (ref 3.5–5.2)
PROT SERPL-MCNC: 7.4 G/DL (ref 6–8.5)
RBC # BLD AUTO: 5.34 10*6/MM3 (ref 3.77–5.28)
SODIUM BLD-SCNC: 137 MMOL/L (ref 136–145)
WBC MORPH BLD: NORMAL
WBC NRBC COR # BLD: 9.2 10*3/MM3 (ref 3.4–10.8)
WHOLE BLOOD HOLD SPECIMEN: NORMAL
WHOLE BLOOD HOLD SPECIMEN: NORMAL

## 2020-06-02 PROCEDURE — 83690 ASSAY OF LIPASE: CPT | Performed by: PHYSICIAN ASSISTANT

## 2020-06-02 PROCEDURE — 85007 BL SMEAR W/DIFF WBC COUNT: CPT | Performed by: PHYSICIAN ASSISTANT

## 2020-06-02 PROCEDURE — 96374 THER/PROPH/DIAG INJ IV PUSH: CPT

## 2020-06-02 PROCEDURE — 99284 EMERGENCY DEPT VISIT MOD MDM: CPT

## 2020-06-02 PROCEDURE — 25010000002 ONDANSETRON PER 1 MG: Performed by: PHYSICIAN ASSISTANT

## 2020-06-02 PROCEDURE — 93005 ELECTROCARDIOGRAM TRACING: CPT

## 2020-06-02 PROCEDURE — 80053 COMPREHEN METABOLIC PANEL: CPT | Performed by: PHYSICIAN ASSISTANT

## 2020-06-02 PROCEDURE — 85025 COMPLETE CBC W/AUTO DIFF WBC: CPT | Performed by: PHYSICIAN ASSISTANT

## 2020-06-02 RX ORDER — SODIUM CHLORIDE 0.9 % (FLUSH) 0.9 %
10 SYRINGE (ML) INJECTION AS NEEDED
Status: DISCONTINUED | OUTPATIENT
Start: 2020-06-02 | End: 2020-06-03 | Stop reason: HOSPADM

## 2020-06-02 RX ORDER — ONDANSETRON 4 MG/1
4 TABLET, ORALLY DISINTEGRATING ORAL EVERY 8 HOURS PRN
Qty: 20 TABLET | Refills: 0 | Status: SHIPPED | OUTPATIENT
Start: 2020-06-02 | End: 2020-09-01 | Stop reason: HOSPADM

## 2020-06-02 RX ORDER — ONDANSETRON 2 MG/ML
4 INJECTION INTRAMUSCULAR; INTRAVENOUS ONCE
Status: COMPLETED | OUTPATIENT
Start: 2020-06-02 | End: 2020-06-02

## 2020-06-02 RX ORDER — OFLOXACIN 3 MG/ML
5 SOLUTION AURICULAR (OTIC) DAILY
Qty: 2 ML | Refills: 0 | Status: SHIPPED | OUTPATIENT
Start: 2020-06-02 | End: 2020-06-02

## 2020-06-02 RX ADMIN — ONDANSETRON 4 MG: 2 INJECTION INTRAMUSCULAR; INTRAVENOUS at 22:20

## 2020-06-02 RX ADMIN — SODIUM CHLORIDE 1000 ML: 900 INJECTION, SOLUTION INTRAVENOUS at 22:19

## 2020-06-03 NOTE — ED PROVIDER NOTES
Subjective   Chief Complaint: Dizziness    Patient is a 40-year-old  female with a history of hypertension, GERD, anxiety presents the ER with dizziness for 3 days.  Patient states that she was seen in the ER 2 weeks ago for similar symptoms, had full work-up including CT head which found to be within normal limits.  Patient states that she has appointment with ENT tomorrow at 10:30 AM for further evaluation.  Patient states that she has had intermittent dizziness for the last month.  States that her symptoms are worse when she goes from a seated to standing position.  She states that she becomes dizzy lightheaded nauseous and then vomits.  Patient denies any headache, blurry vision, fever or chills.  Patient denies any chest pain, shortness of breath abdominal pain or diarrhea.  Denies any history of vertigo.  She denies any trauma.    Location: Head    Quality: Dizzy    Duration: 1 month    Timing: Intermittent    Severity: Moderate    Associated Symptoms: Nausea, vomiting    PCP: Dr. Geeta Hathaway      History provided by:  Patient      Review of Systems   Constitutional: Negative for fever.   HENT: Negative for ear discharge, ear pain, facial swelling, sinus pressure, sore throat and trouble swallowing.    Eyes: Negative for photophobia and visual disturbance.   Respiratory: Negative for shortness of breath and wheezing.    Cardiovascular: Negative for chest pain.   Gastrointestinal: Positive for nausea and vomiting. Negative for abdominal pain, blood in stool and diarrhea.   Genitourinary: Negative for dysuria.   Musculoskeletal: Negative for back pain, myalgias and neck pain.   Skin: Negative for rash.   Neurological: Positive for dizziness and light-headedness. Negative for syncope, weakness and headaches.   Psychiatric/Behavioral: Negative for behavioral problems.   All other systems reviewed and are negative.      Past Medical History:   Diagnosis Date   • Anxiety and depression    • Arthritis    •  Breast injury    • Fibrocystic breast    • GERD (gastroesophageal reflux disease)    • Hypertension    • Morbid obesity (CMS/HCC)        Allergies   Allergen Reactions   • Antihistamines, Diphenhydramine-Type Other (See Comments)     Bloody nose       Past Surgical History:   Procedure Laterality Date   • ABDOMINAL SURGERY     • ENDOSCOPY N/A 8/15/2019    Procedure: ESOPHAGOGASTRODUODENOSCOPY W/BIOPSY;  Surgeon: Lyn Gan MD;  Location: [facility] ENDOSCOPY;  Service: General   • GASTRIC BANDING REMOVAL N/A 10/17/2019    Procedure: GASTRIC BANDING REMOVAL LAPAROSCOPIC;  Surgeon: Lyn Gan MD;  Location: [facility] MAIN OR;  Service: General   • LAPAROSCOPIC GASTRIC BANDING     • LAPAROSCOPIC TUBAL LIGATION         Family History   Problem Relation Age of Onset   • Hypertension Mother    • Cancer Mother    • Hypertension Father    • Heart attack Father    • Hypertension Sister    • Cancer Maternal Grandmother    • Cancer Maternal Grandfather        Social History     Socioeconomic History   • Marital status:      Spouse name: Not on file   • Number of children: Not on file   • Years of education: Not on file   • Highest education level: Not on file   Tobacco Use   • Smoking status: Former Smoker     Last attempt to quit: 2000     Years since quittin.4   • Smokeless tobacco: Never Used   Substance and Sexual Activity   • Alcohol use: No     Frequency: Never   • Drug use: No   • Sexual activity: Defer           Objective   Physical Exam   Constitutional: She is oriented to person, place, and time. She appears well-developed and well-nourished.  Non-toxic appearance. She does not appear ill. No distress.   Patient is morbidly obese  Patient is awake, alert, oriented x3   HENT:   Head: Normocephalic and atraumatic.   Mouth/Throat: Oropharynx is clear and moist.   Bilateral external auditory canals slightly erythematous, slightly edematous, TMs pearly, no effusion, nonbulging, nonperforated   Eyes: Pupils are  "equal, round, and reactive to light. EOM are normal.   EOMs normal, no nystagmus, visual fields full   Neck: Normal range of motion.   Cardiovascular: Normal rate, regular rhythm, normal heart sounds and intact distal pulses.   No murmur heard.  Pulmonary/Chest: Effort normal and breath sounds normal. No respiratory distress. She has no wheezes.   Abdominal: Soft. Normal appearance and bowel sounds are normal. She exhibits no distension and no mass. There is no tenderness. There is no guarding and no CVA tenderness.   Neurological: She is alert and oriented to person, place, and time. No sensory deficit. She exhibits normal muscle tone.   Skin: Skin is warm and dry. Capillary refill takes less than 2 seconds. No rash noted.   Psychiatric: She has a normal mood and affect. Her behavior is normal.   Nursing note and vitals reviewed.      ECG 12 Lead    Date/Time: 6/2/2020 9:42 PM  Performed by: Consuelo Cisneros PA  Authorized by: Consuelo Cisneros PA   Interpreted by physician (Sebastián)  Comparison: compared with previous ECG from 5/15/2020  Similar to previous ECG  Comparison to previous ECG: NSR, rate 75, no acute ST changes  Rhythm: sinus rhythm  Rate: normal  BPM: 88  QRS axis: normal  Conduction: conduction normal  ST Segments: ST segments normal  T Waves: T waves normal  Other: no other findings  Clinical impression: normal ECG                 ED Course    /70   Pulse 83   Temp 98.2 °F (36.8 °C) (Oral)   Resp 21   Ht 167.6 cm (66\")   Wt (!) 159 kg (350 lb)   LMP 05/20/2020   SpO2 97%   BMI 56.49 kg/m²   Labs Reviewed   COMPREHENSIVE METABOLIC PANEL - Abnormal; Notable for the following components:       Result Value    Glucose 142 (*)     Alkaline Phosphatase 145 (*)     All other components within normal limits    Narrative:     GFR Normal >60  Chronic Kidney Disease <60  Kidney Failure <15     CBC WITH AUTO DIFFERENTIAL - Abnormal; Notable for the following components:    RBC 5.34 (*)     " Hemoglobin 10.3 (*)     Hematocrit 33.1 (*)     MCV 62.0 (*)     MCH 19.3 (*)     MCHC 31.2 (*)     RDW 21.4 (*)     Neutrophil % 81.0 (*)     Lymphocyte % 12.9 (*)     Monocyte % 4.7 (*)     Neutrophils, Absolute 7.40 (*)     All other components within normal limits   LIPASE - Normal   RAINBOW DRAW    Narrative:     The following orders were created for panel order [place] Draw.  Procedure                               Abnormality         Status                     ---------                               -----------         ------                     Light Blue Top[[id number]]                                   Final result               Green Top (Gel)[[id number]]                                  Final result               Lavender Top[[id number]]                                     Final result               Gold Top - SST[[id number]]                                   Final result                 Please view results for these tests on the individual orders.   SCAN SLIDE   LIGHT BLUE TOP   GREEN TOP   LAVENDER TOP   GOLD TOP - SST   CBC AND DIFFERENTIAL    Narrative:     The following orders were created for panel order CBC & Differential.  Procedure                               Abnormality         Status                     ---------                               -----------         ------                     CBC Auto Differential[[id number]]        Abnormal            Final result                 Please view results for these tests on the individual orders.     Medications   sodium chloride 0.9 % flush 10 mL (has no administration in time range)   sodium chloride 0.9 % bolus 1,000 mL (0 mL Intravenous Stopped 6/2/20 2305)   ondansetron (ZOFRAN) injection 4 mg (4 mg Intravenous Given 6/2/20 2220)     No radiology results for the last day                                         MDM  Number of Diagnoses or Management Options  Dizzy:   Non-intractable vomiting with nausea, unspecified vomiting type:   Diagnosis  management comments: MEDICAL DECISION  Epic Chart Review: Patient was seen 2 weeks ago, had full work-up including head CT all found to be within normal limits.  Patient was advised to follow-up with ENT.  Comorbidities: Hypertension, GERD, anxiety  Differentials: Vertigo, dehydration, electrolyte abnormality, dysrhythmia, orthostatic hypotension; this list is not all inclusive and does not constitute the entirety of considered causes  Radiology interpretation:  Images reviewed by me and interpreted by radiologist,   CT from previous visit showed no acute intracranial abnormalities.  No CT done today.  Lab interpretation:  Labs viewed by me significant for, CMP glucose 142, alk phos 145, lipase normal 28.  CBC normal WBCs, RBCs elevated 5.34, hemoglobin 10.3 hematocrit 33.1.    While in the ED IV was placed and labs were obtained appropriate PPE was worn during exam and throughout all encounters with the patient.  Patient was given a liter of fluids, Zofran 4 mg.  Lab at times found to be unremarkable.  Physical exam is benign.  Records reviewed from previous visit, patient had normal work-up 2 weeks ago in the ER, including negative CT.  Patient denies any changes in her symptoms, states that they have been persistent.  Patient states he does have ENT follow-up tomorrow at 10:30 AM with Dr. Cool.  Patient states that she was told not to take meclizine at home as this could interfere with any testing that providers do at ENT office.  Patient will be discharged, advised not to take her meclizine, take Zofran as needed for nausea.  Encouraged to drink plenty of fluids, get plenty of rest, to avoid abrupt changes in position.  Patient verbalized understanding.    Discharge plan and instructions were discussed with the patient who verbalized understanding and is in agreement with the plan, all questions were answered at this time.  Patient is aware of signs symptoms that would require immediate return to the emergency  room.  Patient understands importance of following up with primary care provider for further evaluation and worsening concerns as well as blood pressure recheck in the next 4 weeks.    Patient remained afebrile, nontoxic-appearing, no acute respiratory distress throughout entire emergency room stay.  Patient was discharged in improved stable condition with an upright steady gait.       Amount and/or Complexity of Data Reviewed  Clinical lab tests: reviewed and ordered  Tests in the medicine section of CPT®: reviewed    Patient Progress  Patient progress: stable      Final diagnoses:   Dizzy   Non-intractable vomiting with nausea, unspecified vomiting type            Consuelo Cisneros PA  06/02/20 4096

## 2020-06-03 NOTE — DISCHARGE INSTRUCTIONS
Take Zofran as needed for nausea.  Drink plenty of fluids.  Eat smaller meals advance as tolerated.  Make sure to go from seated to standing position slowly to help prevent passing out.    Go to your appointment at ENT tomorrow at 1030 for further management evaluation.    Return to the ER for new or worsening symptoms.

## 2020-06-15 ENCOUNTER — TRANSCRIBE ORDERS (OUTPATIENT)
Dept: PHYSICAL THERAPY | Facility: CLINIC | Age: 40
End: 2020-06-15

## 2020-06-15 DIAGNOSIS — R42 DIZZINESS: Primary | ICD-10-CM

## 2020-06-24 ENCOUNTER — TREATMENT (OUTPATIENT)
Dept: PHYSICAL THERAPY | Facility: CLINIC | Age: 40
End: 2020-06-24

## 2020-06-24 DIAGNOSIS — H81.11 BPPV (BENIGN PAROXYSMAL POSITIONAL VERTIGO), RIGHT: Primary | ICD-10-CM

## 2020-06-24 DIAGNOSIS — R42 DIZZINESS: ICD-10-CM

## 2020-06-24 PROCEDURE — 95992 CANALITH REPOSITIONING PROC: CPT | Performed by: PHYSICAL THERAPIST

## 2020-06-24 PROCEDURE — 97162 PT EVAL MOD COMPLEX 30 MIN: CPT | Performed by: PHYSICAL THERAPIST

## 2020-06-24 NOTE — PROGRESS NOTES
Physical Therapy Initial Evaluation and Plan of Care    Patient: Kyle Pennington   : 1980  Diagnosis/ICD-10 Code:  BPPV (benign paroxysmal positional vertigo), right [H81.11]  Referring practitioner: Justyn Cool MD  Date of Initial Visit: 2020  Today's Date: 2020  Patient seen for 1 sessions           Subjective Questionnaire: DHI: 44% impairment      Subjective Evaluation    History of Present Illness  Mechanism of injury: Pt with c/o dizziness x2 months. Has been to the ER twice due to dizziness. CT scan of head normal. Saw ENT and was told R inner ear is damaged. Has f/u 2020. Takes Meclizine 3x/day and states it decreases dizziness to that she can function. Increased dizziness with looking up, rinsing hair in shower. Increased dizziness with bending over. Dizziness with rolling over in bed, supine > sit. Gets dizzy while looking down when cooking also. When dizziness starts, she gets light-headed, rings start ringing, and she gets sick. Has fallen to her knees in the kitchen once. Dizziness when scrolling on her phone. Difficulty following scroller at bottom of TV. Difficulty reading. Denies any neck pain, no changes in hearing or vision.      Patient Occupation: full-time at [employer] but currently on leave due to COVID & childcare Quality of life: good    Pain  Relieving factors: rest  Aggravating factors: overhead activity, ambulation and repetitive movement  Progression: no change    Social Support  Lives in: apartment  Lives with: young children    Diagnostic Tests  CT scan: normal    Patient Goals  Patient goals for therapy: improved balance, independence with ADLs/IADLs, return to sport/leisure activities and return to work             Objective     Gait: slight decrease in ashia, guarded with little to no head movement.  Strength: B LE grossly 4+/5 to 5/5.    Symptoms worsened with looking up, bending over, rolling over in bed, supine>sit.  Symptoms improved with  sitting still and closing eyes, use of Meclizine.  Associated symptoms: ringing in ears, vomiting.  DHI indicates 44% impairment with limitations in head movements, rolling over in bed, household activities, recreational activities, reading.  Cervical AROM is WNL without symptom aggravation.  Cervical instability negative.    Negative central vestibular signs (smooth pursuit, saccades, resting nystagmus).  Positive peripheral vestibular signs (VOR, head thrust).  Modified CTSIB = 1/4 (30,4,30,2) indicating decreased sensory integration in balance with vestibular weakness and visual dependence.    Modified DGI= 10/12 (3,3, 2, 2) indicating fall risk.    SLS = Plan to assess at later date.    BPPV testing: [facility] Halpike  R positive; Plan to assess L DHP and Roll test B as indicated.  Performed CRT Epley R.        Assessment & Plan     Assessment  Impairments: abnormal gait, activity intolerance, impaired balance, lacks appropriate home exercise program and safety issue  Assessment details: Pt is 39 yo female with ~2 mo hx of dizziness. Pt presents with positive R Jaren-Hallpike this date. Pt with impaired sensory integration as indicated by MCTSIB = 2/4. Pt is having difficulty with transitional movements and bed mobility. Difficulty walking in her home at night. Difficulty with falls. Difficulty with looking up and looking down. Becomes dizzy at times while cooking. DHI indicates 44% impairment.    Patient presents with the impairments listed above and based on the objective findings and the physical therapy evaluation, the patient’s condition has the potential to improve in response to therapy.   The patient’s condition and/or services required are at a level of complexity that necessitates the skill & supervision of a physical therapist.    Prognosis: good  Functional Limitations: lifting, sleeping, walking, pulling, moving in bed, reaching overhead and unable to perform repetitive tasks  Goals  Plan Goals: STG:  - Pt  to report 50% improvement in freq of dizziness in 2 weeks.  - Pt to be independent with HEP in 2 weeks.  LTG:  - Improve DHI to 15% or less impairment by discharge.  - Pt to report no dizziness while performing household tasks by discharge.  - Improve MCTSIB to 3/4 or better for improved sensory integration by discharge.    Plan  Therapy options: will be seen for skilled physical therapy services  Planned therapy interventions: balance/weight-bearing training, body mechanics training, gait training, home exercise program, manual therapy, neuromuscular re-education, postural training, strengthening and therapeutic activities  Frequency: 1-2x/wk.  Duration in visits: 12  Treatment plan discussed with: patient        Timed:         Manual Therapy:         mins  61310;     Therapeutic Exercise:    5     mins  76570;     Neuromuscular Marion:        mins  81384;    Therapeutic Activity:          mins  38143;     Gait Training:           mins  02025;     Ultrasound:          mins  56338;    Ionto                                   mins   26192  Can Repos     15     mins 74796    Un-Timed:  Electrical Stimulation:         mins  19672 ( );  Dry Needling          mins self-pay  Traction          mins 49793  Low Eval     25     Mins  85659  Mod Eval          Mins  46058  High Eval                            Mins  21746  Self - Care                          mins  29798        Timed Treatment:   20   mins   Total Treatment:     45   mins    PT SIGNATURE: Ryann Rowley, PT   DATE TREATMENT INITIATED: 6/24/2020    Initial Certification  Certification Period: 9/22/2020  I certify that the therapy services are furnished while this patient is under my care.  The services outlined above are required by this patient, and will be reviewed every 90 days.     PHYSICIAN: Justyn Cool MD  _____________________________________________________________________    DATE:     Please sign and return via fax to  .. Thank you, [facility].

## 2020-06-26 ENCOUNTER — TREATMENT (OUTPATIENT)
Dept: PHYSICAL THERAPY | Facility: CLINIC | Age: 40
End: 2020-06-26

## 2020-06-26 DIAGNOSIS — H81.11 BPPV (BENIGN PAROXYSMAL POSITIONAL VERTIGO), RIGHT: Primary | ICD-10-CM

## 2020-06-26 DIAGNOSIS — R42 DIZZINESS: ICD-10-CM

## 2020-06-26 PROCEDURE — 97112 NEUROMUSCULAR REEDUCATION: CPT | Performed by: PHYSICAL THERAPIST

## 2020-06-26 PROCEDURE — 95992 CANALITH REPOSITIONING PROC: CPT | Performed by: PHYSICAL THERAPIST

## 2020-06-26 NOTE — PROGRESS NOTES
Physical Therapy Daily Progress Note      Patient: Kyle Pennington   : 1980  Diagnosis/ICD-10 Code:  BPPV (benign paroxysmal positional vertigo), right [H81.11]  Referring practitioner: Justyn Cool MD  Date of Initial Visit: Type: THERAPY  Noted: 2020  Today's Date: 2020  Patient seen for 2 sessions         Kyle Pennington reports: she has not had Meclizine x2 days, dizziness has been the same even without medicine.    Objective : Very mild L DHP and B Roll test. R DHP +, performed R Epley with good tolerance. Pt states that symptoms during DHP was very similar to last visit.  See Exercise, Manual, and Modality Logs for complete treatment.       Assessment/Plan : Continued dizziness with + R DHP. No increase in dizziness without Meclizine. Improved ability during EC standing. Needs continued vestibular training.    Progress per Plan of Care           Timed:         Manual Therapy:         mins  64511;     Therapeutic Exercise:         mins  72912;     Neuromuscular Marion:    10    mins  68153;    Therapeutic Activity:          mins  98518;     Gait Training:           mins  02405;     Ultrasound:          mins  05093;    Ionto                                   mins   23078  Self Care                            mins   88346  Canalith Repos               15    mins  03267    Un-Timed:  Electrical Stimulation:         mins  06778 ( );  Dry Needling          mins self-pay  Traction          mins 95895  Low Eval          Mins  48981  Mod Eval          Mins  78960  High Eval                            Mins  51655    Timed Treatment:   25   mins   Total Treatment:     25   mins    Ryann Rowley, PT  Physical Therapist

## 2020-06-30 ENCOUNTER — OFFICE VISIT (OUTPATIENT)
Dept: BARIATRICS/WEIGHT MGMT | Facility: CLINIC | Age: 40
End: 2020-06-30

## 2020-06-30 ENCOUNTER — TREATMENT (OUTPATIENT)
Dept: PHYSICAL THERAPY | Facility: CLINIC | Age: 40
End: 2020-06-30

## 2020-06-30 VITALS — WEIGHT: 293 LBS | BODY MASS INDEX: 47.09 KG/M2 | HEIGHT: 66 IN

## 2020-06-30 DIAGNOSIS — E66.9 SUPER OBESE: Primary | ICD-10-CM

## 2020-06-30 DIAGNOSIS — R42 DIZZINESS: Primary | ICD-10-CM

## 2020-06-30 PROCEDURE — 97110 THERAPEUTIC EXERCISES: CPT | Performed by: PHYSICAL THERAPIST

## 2020-06-30 PROCEDURE — 97112 NEUROMUSCULAR REEDUCATION: CPT | Performed by: PHYSICAL THERAPIST

## 2020-06-30 NOTE — PATIENT INSTRUCTIONS
Weight loss goal 2-5 lb  by next appointment and Staying within 1775 kcal per day, Journal food daily

## 2020-06-30 NOTE — PROGRESS NOTES
[facility]  [address] [place] [address]  [address] [place] [address]  Dept: [phone]  6/30/2020      Kyle Pennington.  [id number]  [id number]  1980  female      Chief Complaint   Patient presents with   • Obesity   • Nutrition Counseling   • Weight Loss       The patient is here for month SWL #5 of their pre-operative supervised weight loss visit. She had a gain of 9 lbs. Has partially met following goals: 1) No exercise at this time d/t vertigo, 2) Journaling food-goal met, 3) Has planned lighter snacks . Patient is working on eliminating fast foods, fried foods, sweets and soda.  Kyle Pennington has been increasing her daily water intake. She has not been exercising: vertigo.    Patient states they have made positive changes including drinking water, fruits and vegetable.   The patient admits to be struggling with snacking and soft.     Snacker     24 hr recall:  Breakfast: Protein Shake  Lunch: Hard boilded egg (2) grapes  Dinner: Roasted vegetables and grilled chicken  Snack: Celery with peanut butter, veggie chips   Drink: water    Review of Systems  There were no vitals filed for this visit.  Patient Active Problem List   Diagnosis   • Morbid obesity (CMS/HCC)   • Advanced maternal age (AMA) in pregnancy   • Chronic hypertension in pregnancy   • History of laparoscopic adjustable gastric banding   • Twin pregnancy in second trimester   • BMI 50.0-59.9, adult (CMS/HCC)   • Pre-operative examination   • GERD (gastroesophageal reflux disease)     Body mass index is 57.78 kg/m².  Documented weights    06/30/20 1058   Weight: (!) 162 kg (358 lb)     Weight change from last appointment:+9 lb  Weight change overall:+6 lb    Goals:  Weight loss goal 2-5 lb  by next appointment and Staying within 1775 kcal per day, Journal food daily     Discussion/Plan:  Obesity/Morbid Obesity: Currently the patient's  weight is increasing. I reviewed the appropriate dietary choices with the patient and encouraged the necessary changes. Recommended at least 60 grams of protein per day, around 33 grams of fats and less than 100 grams of carbohydrates. Reviewed calorie intake if patient wanted to calorie count and/or had BMR. Instructed patient to drink 64 ounces of water per day and exercise a minimum of 150 minutes per week including both cardio and strength training.  Discussed with pt also eating and drinking separately stopping 30 minutes before meals and 45 minutes after meals. Discussed the option of keeping a food journal which will help patient become more aware of the nutritional value of foods so they are more prepared after surgery.    The patient was given written materials from our office for education.   I answered all of the patients questions regarding dietary changes, exercise or surgical options.  Future Appointments   Date Time Provider Department Center   6/30/2020 11:00 AM Ledy Acosta RD [facility] None   7/8/2020 11:00 AM Ryann Rowley, PT [facility]     The patient will follow up in one month on.    PES: Undesirable food choices related to lack of readiness to change AEB drinking soda again.     The total time spent face to face was 20 minutes with 20 minutes spent counseling.

## 2020-06-30 NOTE — PROGRESS NOTES
Physical Therapy Daily Progress Note      Patient: Kyle Pennington   : 1980  Diagnosis/ICD-10 Code:  Dizziness [R42]  Referring practitioner: Justyn Cool MD  Date of Initial Visit: Type: THERAPY  Noted: 2020  Today's Date: 2020  Patient seen for 3 sessions         Kyle Pennington reports: she is doing better. Has not taken Meclizine x3 days. Having occasional dizziness but not as frequent and states it is very mild. Not having dizziness when rinsing hair in shower. Very mild with standing back up after bending over to  kids toys and during supine to sit. Sees MD tomorrow.      Objective : all BPPV positional testing NEGATIVE this date. Progressed vestibular activities. Able to perform EC with NBOS on foam surface without difficulty.   See Exercise, Manual, and Modality Logs for complete treatment.       Assessment/Plan : Great improvement in dizziness with positional testing negative. Reports of mild dizziness/lightheadedness most likely related to slight changes in BP with positional changes. Good tolerance to progression of vestibular activities with no reports of dizziness during activities.    Progress per Plan of Care with decrease frequency to 1x/wk.           Timed:         Manual Therapy:         mins  50150;     Therapeutic Exercise:    10     mins  53376;     Neuromuscular Marion:    15    mins  57494;    Therapeutic Activity:          mins  82653;     Gait Training:           mins  85848;     Ultrasound:          mins  75118;    Ionto                                   mins   69864  Self Care                            mins   56246  Canalith Repos                   mins  4209    Un-Timed:  Electrical Stimulation:         mins  61886 ( );  Dry Needling          mins self-pay  Traction          mins 39387  Low Eval          Mins  93348  Mod Eval          Mins  96812  High Eval                            Mins  88782    Timed Treatment:   25   mins   Total  Treatment:     25   mins    Ryann Rowley, PT  Physical Therapist

## 2020-07-08 ENCOUNTER — TREATMENT (OUTPATIENT)
Dept: PHYSICAL THERAPY | Facility: CLINIC | Age: 40
End: 2020-07-08

## 2020-07-08 DIAGNOSIS — R42 DIZZINESS: Primary | ICD-10-CM

## 2020-07-08 PROCEDURE — 97110 THERAPEUTIC EXERCISES: CPT | Performed by: PHYSICAL THERAPIST

## 2020-07-08 PROCEDURE — 97112 NEUROMUSCULAR REEDUCATION: CPT | Performed by: PHYSICAL THERAPIST

## 2020-07-08 NOTE — PROGRESS NOTES
Physical Therapy Daily Progress Note      Patient: Kyle Pennington   : 1980  Diagnosis/ICD-10 Code:  Dizziness [R42]  Referring practitioner: Justyn Cool MD  Date of Initial Visit: Type: THERAPY  Noted: 2020  Today's Date: 2020  Patient seen for 4 sessions         Kyle Pennington reports: no dizziness since last visit and has not needed any Meclizine x1 wk. Doing well. States she wants to manage with HEP and will call if additional visits needed.  Subjective Questionnaire: DHI: 6% impairment (initial was 77%)      Objective : Added eleno chamorro. Encouraged pt to call if any dizziness arises over the next 3 weeks. Pt verbalized understanding.  MCTSIB = 4/4 (30,30,30,30)  See Exercise, Manual, and Modality Logs for complete treatment.       Assessment/Plan : No dizziness or need for meclizine over the last week. Good tolerance to all ther ex and vestibular activities this date. Pt has met all goals at this time.    STG:  - Pt to report 50% improvement in freq of dizziness in 2 weeks. - MET  - Pt to be independent with HEP in 2 weeks. - MET  LTG:  - Improve DHI to 15% or less impairment by discharge. - MET  - Pt to report no dizziness while performing household tasks by discharge. - MET  - Improve MCTSIB to 3/4 or better for improved sensory integration by discharge. - MET    Pt to call if additional visits needed. Anticipate D/C if no visits made before end of month.           Timed:         Manual Therapy:         mins  79331;     Therapeutic Exercise:    10     mins  11759;     Neuromuscular Marion:    20    mins  57020;    Therapeutic Activity:          mins  19470;     Gait Training:           mins  89692;     Ultrasound:          mins  33315;    Ionto                                   mins   53584  Self Care                            mins   72074  Canalith Repos                   mins  4209    Un-Timed:  Electrical Stimulation:         mins  90665 ( );  Dry Needling           mins self-pay  Traction          mins 78056  Low Eval          Mins  29584  Mod Eval          Mins  14224  High Eval                            Mins  23893    Timed Treatment:   30   mins   Total Treatment:     30   mins    Ryann Rowley, PT  Physical Therapist

## 2020-07-20 ENCOUNTER — OFFICE VISIT (OUTPATIENT)
Dept: BARIATRICS/WEIGHT MGMT | Facility: CLINIC | Age: 40
End: 2020-07-20

## 2020-07-20 VITALS — HEIGHT: 66 IN | WEIGHT: 293 LBS | BODY MASS INDEX: 47.09 KG/M2

## 2020-07-20 DIAGNOSIS — E66.9 SUPER OBESE: Primary | ICD-10-CM

## 2020-07-20 NOTE — PROGRESS NOTES
[facility]  [address] [place] [address]  [address] [place] [address]  Dept: [phone]  7/20/2020      Kyle Pennington.  [id number]  [id number]  1980  female      Chief Complaint   Patient presents with   • Obesity   • Nutrition Counseling   • Weight Loss       The patient is here for month SWL #5 of their pre-operative supervised weight loss visit. She had a gain of .4 lbs. Has partially met following goals: 1) No weight loss and weight loss goal not met, 2) Able to stay within the 1775 kcal intake- goal met, 3) Journal food goal met. Patient is working on eliminating fast foods, fried foods, sweets and soda.  Kyle Pennington has been increasing her daily water intake. She has been exercising: not able to d/t health.    Patient states they have made positive changes including 64 fl oz water, staying away red meat (using turkey), 1x per week more physically active, and not drinking sodas.    The patient admits to be struggling with health issues (anemia with a possible bleed).     Eat in response to stress    24 hr recall:  Breakfast: Scrambled eggs with banana  Lunch: Chicken salad with grapes and crackers  Dinner: Grilled chicken and stir fried vegetables, rice  Snacks: yogurt, grapefruit  Drinks: water, poweraid     Review of Systems  There were no vitals filed for this visit.  Patient Active Problem List   Diagnosis   • Morbid obesity (CMS/HCC)   • Advanced maternal age (AMA) in pregnancy   • Chronic hypertension in pregnancy   • History of laparoscopic adjustable gastric banding   • Twin pregnancy in second trimester   • BMI 50.0-59.9, adult (CMS/HCC)   • Pre-operative examination   • GERD (gastroesophageal reflux disease)     Body mass index is 57.85 kg/m².  Documented weights    07/20/20 1401   Weight: (!) 163 kg (358 lb 6.4 oz)     Weight change from last appointment:+. 4 lb   Weight change  overall:+4.4 lb     Goals:  Weight loss goal 2-5 lb.  by next appointment and 1600 kcal per day by next appointment.     Discussion/Plan:  Obesity/Morbid Obesity: Currently the patient's weight is increasing. I reviewed the appropriate dietary choices with the patient and encouraged the necessary changes. Recommended at least 60 grams of protein per day, around 33 grams of fats and less than 100 grams of carbohydrates. Reviewed calorie intake if patient wanted to calorie count and/or had BMR. Instructed patient to drink 64 ounces of water per day and exercise a minimum of 150 minutes per week including both cardio and strength training.  Discussed with pt also eating and drinking separately stopping 30 minutes before meals and 45 minutes after meals. Discussed the option of keeping a food journal which will help patient become more aware of the nutritional value of foods so they are more prepared after surgery.    The patient was given written materials from our office for education.   I answered all of the patients questions regarding dietary changes, exercise or surgical options.    The patient will follow up in one month on.    PES:     The total time spent face to face was 20 minutes with 20 minutes spent counseling.

## 2020-08-05 ENCOUNTER — OFFICE (OUTPATIENT)
Dept: URBAN - METROPOLITAN AREA CLINIC 64 | Facility: CLINIC | Age: 40
End: 2020-08-05
Payer: COMMERCIAL

## 2020-08-05 VITALS
HEIGHT: 66 IN | DIASTOLIC BLOOD PRESSURE: 86 MMHG | WEIGHT: 293 LBS | HEART RATE: 96 BPM | SYSTOLIC BLOOD PRESSURE: 136 MMHG

## 2020-08-05 DIAGNOSIS — R19.5 OTHER FECAL ABNORMALITIES: ICD-10-CM

## 2020-08-05 DIAGNOSIS — D50.9 IRON DEFICIENCY ANEMIA, UNSPECIFIED: ICD-10-CM

## 2020-08-05 PROCEDURE — 99203 OFFICE O/P NEW LOW 30 MIN: CPT | Performed by: NURSE PRACTITIONER

## 2020-08-17 ENCOUNTER — OFFICE VISIT (OUTPATIENT)
Dept: BARIATRICS/WEIGHT MGMT | Facility: CLINIC | Age: 40
End: 2020-08-17

## 2020-08-17 VITALS — HEIGHT: 66 IN | WEIGHT: 293 LBS | BODY MASS INDEX: 47.09 KG/M2

## 2020-08-17 DIAGNOSIS — E66.9 SUPER OBESE: Primary | ICD-10-CM

## 2020-08-17 NOTE — PATIENT INSTRUCTIONS
Exercise  30-60 minutes for  4 day by next appointment, Journal food & exercise 5 times per week by next appointment and Chew food 15-20 x per bite by next appointment

## 2020-08-17 NOTE — PROGRESS NOTES
[facility]  [address] [place] [address]  [address] [place] [address]  Dept: [phone]  8/17/2020      Kyle Pennington.  [id number]  [id number]  1980  female      Chief Complaint   Patient presents with   • Obesity   • Nutrition Counseling   • Weight Loss       The patient is here for month SWL #6 of their pre-operative supervised weight loss visit. She had a loss of 2.4 lbs. Has partially met following goals: 1) Met goal for 1600 kcal per day, 2) Met goal for 2-5 lb weight loss. Patient is working on eliminating fast foods, fried foods, sweets and soda.  Kyle Pennington has been increasing her daily water intake. She has been exercising: 3 x per week with walk and videos.    Patient states they have made positive changes including lost weight, drinking water, not eating past 7 pm and exercising.  The patient admits to be struggling with eating a lot of red meat.     Particular food craving    24 hr recall:  Breakfast: turkey wright, wheat toast (1 slice), 1 egg  Snack: protein shake  Lunch: salad with ham (chopped) low fat ranch dress, saltines (5 each)  Dinner: Stir alonzo with grilled chicken with mixed fruit  Drink: water, Poweraid zero      Review of Systems  There were no vitals filed for this visit.  Patient Active Problem List   Diagnosis   • Morbid obesity (CMS/HCC)   • Advanced maternal age (AMA) in pregnancy   • Chronic hypertension in pregnancy   • History of laparoscopic adjustable gastric banding   • Twin pregnancy in second trimester   • BMI 50.0-59.9, adult (CMS/HCC)   • Pre-operative examination   • GERD (gastroesophageal reflux disease)     Body mass index is 57.46 kg/m².  Documented weights    08/17/20 1003   Weight: (!) 161 kg (356 lb)     Weight change from last appointment:-2.4 lb  Weight change overall: +2 lb     Goals:  Exercise  30-60 minutes for  4 day by next appointment, Journal food  & exercise 5 times per week by next appointment and Chew food 15-20 x per bite by next appointment    Discussion/Plan:  Obesity/Morbid Obesity: Currently the patient's weight is decreasing. I reviewed the appropriate dietary choices with the patient and encouraged the necessary changes. Recommended at least 60 grams of protein per day, around 33 grams of fats and less than 100 grams of carbohydrates. Reviewed calorie intake if patient wanted to calorie count and/or had BMR. Instructed patient to drink 64 ounces of water per day and exercise a minimum of 150 minutes per week including both cardio and strength training.  Discussed with pt also eating and drinking separately stopping 30 minutes before meals and 45 minutes after meals. Discussed the option of keeping a food journal which will help patient become more aware of the nutritional value of foods so they are more prepared after surgery.    The patient was given written materials from our office for education.   I answered all of the patients questions regarding dietary changes, exercise or surgical options.    The patient will follow up in one month on.    PES:     The total time spent face to face was 20 minutes with 20 minutes spent counseling.

## 2020-08-24 ENCOUNTER — DOCUMENTATION (OUTPATIENT)
Dept: PHYSICAL THERAPY | Facility: CLINIC | Age: 40
End: 2020-08-24

## 2020-08-24 DIAGNOSIS — R42 DIZZINESS: Primary | ICD-10-CM

## 2020-08-24 NOTE — PROGRESS NOTES
Discharge Summary  Discharge Summary from Physical Therapy Report      Dates  PT visit: 6/24/2002-7/8/2020  Number of Visits: 4     Discharge Status of Patient: See MD Note dated 7/8/2020    Goals: All Met    Discharge Plan: Continue with current home exercise program as instructed    Comments: At last visit pt had met all goals and was encouraged to call if any dizziness arose. No additional appts made.    Date of Discharge 8/24/2020        Ryann Rowley, PT  Physical Therapist

## 2020-08-25 RX ORDER — FERROUS SULFATE 325(65) MG
325 TABLET ORAL
COMMUNITY
End: 2020-12-02

## 2020-08-29 ENCOUNTER — HOSPITAL ENCOUNTER (OUTPATIENT)
Dept: GENERAL RADIOLOGY | Facility: HOSPITAL | Age: 40
Discharge: HOME | End: 2020-08-29
Admitting: SURGERY

## 2020-08-29 ENCOUNTER — LAB (OUTPATIENT)
Dept: LAB | Facility: HOSPITAL | Age: 40
End: 2020-08-29

## 2020-08-29 PROCEDURE — U0004 COV-19 TEST NON-CDC HGH THRU: HCPCS

## 2020-08-29 PROCEDURE — U0002 COVID-19 LAB TEST NON-CDC: HCPCS

## 2020-08-29 PROCEDURE — C9803 HOPD COVID-19 SPEC COLLECT: HCPCS

## 2020-08-29 PROCEDURE — 71046 X-RAY EXAM CHEST 2 VIEWS: CPT

## 2020-08-31 ENCOUNTER — ANESTHESIA EVENT (OUTPATIENT)
Dept: GASTROENTEROLOGY | Facility: HOSPITAL | Age: 40
End: 2020-08-31

## 2020-08-31 LAB
REF LAB TEST METHOD: NORMAL
SARS-COV-2 RNA RESP QL NAA+PROBE: NOT DETECTED

## 2020-09-01 ENCOUNTER — HOSPITAL ENCOUNTER (OUTPATIENT)
Facility: HOSPITAL | Age: 40
Setting detail: HOSPITAL OUTPATIENT SURGERY
Discharge: HOME | End: 2020-09-01
Attending: INTERNAL MEDICINE | Admitting: INTERNAL MEDICINE

## 2020-09-01 ENCOUNTER — ANESTHESIA (OUTPATIENT)
Dept: GASTROENTEROLOGY | Facility: HOSPITAL | Age: 40
End: 2020-09-01

## 2020-09-01 ENCOUNTER — ON CAMPUS - OUTPATIENT (OUTPATIENT)
Dept: URBAN - METROPOLITAN AREA HOSPITAL 85 | Facility: HOSPITAL | Age: 40
End: 2020-09-01
Payer: COMMERCIAL

## 2020-09-01 VITALS
HEIGHT: 66 IN | HEART RATE: 66 BPM | RESPIRATION RATE: 15 BRPM | OXYGEN SATURATION: 95 % | DIASTOLIC BLOOD PRESSURE: 85 MMHG | SYSTOLIC BLOOD PRESSURE: 122 MMHG | BODY MASS INDEX: 47.09 KG/M2 | WEIGHT: 293 LBS

## 2020-09-01 DIAGNOSIS — R19.5 OTHER FECAL ABNORMALITIES: ICD-10-CM

## 2020-09-01 DIAGNOSIS — R19.5 OCCULT BLOOD IN STOOLS: ICD-10-CM

## 2020-09-01 DIAGNOSIS — D64.9 ANEMIA: ICD-10-CM

## 2020-09-01 DIAGNOSIS — K44.9 DIAPHRAGMATIC HERNIA WITHOUT OBSTRUCTION OR GANGRENE: ICD-10-CM

## 2020-09-01 DIAGNOSIS — D50.9 IRON DEFICIENCY ANEMIA, UNSPECIFIED: ICD-10-CM

## 2020-09-01 DIAGNOSIS — D12.3 BENIGN NEOPLASM OF TRANSVERSE COLON: ICD-10-CM

## 2020-09-01 DIAGNOSIS — D50.0 IRON DEFICIENCY ANEMIA SECONDARY TO BLOOD LOSS (CHRONIC): ICD-10-CM

## 2020-09-01 PROCEDURE — 25010000002 PROPOFOL 10 MG/ML EMULSION: Performed by: ANESTHESIOLOGY

## 2020-09-01 PROCEDURE — 88305 TISSUE EXAM BY PATHOLOGIST: CPT | Performed by: INTERNAL MEDICINE

## 2020-09-01 PROCEDURE — 43235 EGD DIAGNOSTIC BRUSH WASH: CPT | Performed by: INTERNAL MEDICINE

## 2020-09-01 PROCEDURE — 45385 COLONOSCOPY W/LESION REMOVAL: CPT | Performed by: INTERNAL MEDICINE

## 2020-09-01 RX ORDER — ONDANSETRON 2 MG/ML
4 INJECTION INTRAMUSCULAR; INTRAVENOUS ONCE AS NEEDED
Status: DISCONTINUED | OUTPATIENT
Start: 2020-09-01 | End: 2020-09-01 | Stop reason: HOSPADM

## 2020-09-01 RX ORDER — ONDANSETRON 4 MG/1
4 TABLET, ORALLY DISINTEGRATING ORAL EVERY 8 HOURS PRN
COMMUNITY
End: 2021-10-29

## 2020-09-01 RX ORDER — MAGNESIUM CARB/ALUMINUM HYDROX 105-160MG
296 TABLET,CHEWABLE ORAL ONCE
Status: DISCONTINUED | OUTPATIENT
Start: 2020-09-01 | End: 2020-09-01 | Stop reason: HOSPADM

## 2020-09-01 RX ORDER — SODIUM CHLORIDE 0.9 % (FLUSH) 0.9 %
10 SYRINGE (ML) INJECTION AS NEEDED
Status: DISCONTINUED | OUTPATIENT
Start: 2020-09-01 | End: 2020-09-01 | Stop reason: HOSPADM

## 2020-09-01 RX ORDER — SODIUM CHLORIDE 0.9 % (FLUSH) 0.9 %
3 SYRINGE (ML) INJECTION EVERY 12 HOURS SCHEDULED
Status: DISCONTINUED | OUTPATIENT
Start: 2020-09-01 | End: 2020-09-01 | Stop reason: HOSPADM

## 2020-09-01 RX ORDER — SODIUM CHLORIDE 9 MG/ML
9 INJECTION, SOLUTION INTRAVENOUS CONTINUOUS PRN
Status: DISCONTINUED | OUTPATIENT
Start: 2020-09-01 | End: 2020-09-01 | Stop reason: HOSPADM

## 2020-09-01 RX ORDER — PROPOFOL 10 MG/ML
VIAL (ML) INTRAVENOUS AS NEEDED
Status: DISCONTINUED | OUTPATIENT
Start: 2020-09-01 | End: 2020-09-01 | Stop reason: SURG

## 2020-09-01 RX ORDER — SODIUM CHLORIDE 0.9 % (FLUSH) 0.9 %
10 SYRINGE (ML) INJECTION EVERY 12 HOURS SCHEDULED
Status: DISCONTINUED | OUTPATIENT
Start: 2020-09-01 | End: 2020-09-01 | Stop reason: HOSPADM

## 2020-09-01 RX ADMIN — SODIUM CHLORIDE 9 ML/HR: 900 INJECTION, SOLUTION INTRAVENOUS at 09:22

## 2020-09-01 RX ADMIN — PROPOFOL 150 MG: 10 INJECTION, EMULSION INTRAVENOUS at 09:54

## 2020-09-01 RX ADMIN — PROPOFOL 50 MG: 10 INJECTION, EMULSION INTRAVENOUS at 09:57

## 2020-09-01 RX ADMIN — PROPOFOL 40 MG: 10 INJECTION, EMULSION INTRAVENOUS at 10:00

## 2020-09-01 RX ADMIN — PROPOFOL 40 MG: 10 INJECTION, EMULSION INTRAVENOUS at 10:05

## 2020-09-01 NOTE — ANESTHESIA PREPROCEDURE EVALUATION
Anesthesia Evaluation     Patient summary reviewed and Nursing notes reviewed   NPO Solid Status: > 8 hours  NPO Liquid Status: > 8 hours           Airway   Mallampati: II  TM distance: >3 FB  Neck ROM: full  No difficulty expected  Dental - normal exam     Pulmonary - negative pulmonary ROS and normal exam   Cardiovascular - normal exam    (+) hypertension,       Neuro/Psych  (+) psychiatric history Depression and Anxiety,     GI/Hepatic/Renal/Endo    (+) morbid obesity, GERD,      Musculoskeletal (-) negative ROS    Abdominal  - normal exam    Bowel sounds: normal.   Substance History - negative use     OB/GYN negative ob/gyn ROS         Other                        Anesthesia Plan    ASA 3     MAC     intravenous induction     Anesthetic plan, all risks, benefits, and alternatives have been provided, discussed and informed consent has been obtained with: patient.

## 2020-09-01 NOTE — DISCHARGE INSTRUCTIONS
A responsible adult should stay with you and you should rest quietly for the rest of the day.    Do not drink alcohol, drive, operate any heavy machinery or power tools or make any legal/important decisions for the next 24 hours.     Progress your diet as tolerated.  If you begin to experience severe pain, increased shortness of breath, racing heartbeat or a fever above 101 F, seek immediate medical attention.     Follow up with MD as instructed. Call office for results in 3 to 5 days if needed.    [phone]

## 2020-09-01 NOTE — OP NOTE
ESOPHAGOGASTRODUODENOSCOPY, COLONOSCOPY Procedure Report    Patient Name:  Kyle Pennington  YOB: 1980    Date of Surgery:  9/1/2020     Pre-Op Diagnosis:  Occult blood in stools [R19.5]  Anemia [D64.9]  Iron deficiency anemia, unspecified [D50.9]       Post-Op Diagnosis Codes:     * Occult blood in stools [R19.5]     * Anemia [D64.9]     * Iron deficiency anemia, unspecified [D50.9]    Post-Op Diagnosis:  1.  Hiatal hernia  2.  Transverse colon polyp    Procedure/CPT® Codes:        Staff:  Surgeon(s):  Giovanni Lara MD         Anesthesia: Monitored Anesthesia Care    Implants:    Nothing was implanted during the procedure    Specimen:        See Below    Complications:  None    Description of Procedure:  Informed consent was obtained for the procedure, including sedation.  Risks of perforation, hemorrhage, adverse drug reaction and aspiration were discussed.  The patient was brought into the endoscopy suite. Continuous cardiopulmonary monitoring was performed. The patient was placed in the left lateral decubitus position.  The bite block was inserted into the patient's mouth. After adequate sedation was attained, the Olympus gastroscope was inserted into the patient's mouth and advanced to the second portion of the duodenum without difficulty.  Circumferential examination was performed. A retroflex exam was performed in the patient's stomach.  On completion of the exam, the bowel was decompressed, the scope was removed from the patient, the patient tolerated the procedure well, there were no immediate post-operative complications.  Blood loss was none.    Examination of the esophagus: Small hiatal hernia  Examination of the stomach: Normal  Examination of the duodenum: Normal    Subsequently,  the Olympus colonoscope was inserted into the patient's rectum and advanced to the level of the cecum and terminal ileum without difficulty.  The bowel prep was excellent.  Circumferential  examination of the patient's colon was performed on scope withdrawal.  The cecum, ascending colon, and hepatic flexure were examined twice.  The transverse colon, splenic flexure, descending colon, and rectum were examined.  A retroflex exam was performed in the rectum and was normal.  The bowel was decompressed, the scope was withdrawn from the patient, and the patient tolerated the procedure well. There were no immediate post-operative complications.  Blood loss was none.    Findings:   Terminal ileum: Normal  Colon: Pedunculated 1 cm polyp removed with cold snare polypectomy    Impression:  1.  Small hiatal hernia  2.  Transverse colon polyp status post cold snare polypectomy    Recommendations:  1.  High-fiber diet 2.  Continue iron supplementation 3.  Follow-up in office as scheduled 4.  Repeat colonoscopy in 7 years if polyp is adenomatous.      Giovanni Lara MD     Date: 9/1/2020  Time: 10:18

## 2020-09-01 NOTE — H&P
GI PREOPERATIVE HISTORY AND PHYSICAL:    Referring Provider:    Geeta Hathaway APRN  [unfilled]    Chief complaint: Iron deficiency anemia  Subjective .     History of present illness:  Iron deficiency anemia    Kyle Pennington is a 40 y.o. female who presents today for Procedure(s):  ESOPHAGOGASTRODUODENOSCOPY  COLONOSCOPY for the indications listed below.     The updated Patient Profile was reviewed prior to the procedure, in conjunction with the Physical Exam, including medical conditions, surgical procedures, medications, allergies, family history and social history.     Pre-operatively, I reviewed the indication(s) for the procedure, the risks of the procedure [including but not limited to: unexpected bleeding possibly requiring hospitalization and/or unplanned repeat procedures, perforation possibly requiring surgical treatment, missed lesions and complications of sedation/MAC (also explained by anesthesia staff)].     I have evaluated the patient for risks associated with the planned anesthesia and the procedure to be performed and find the patient an acceptable candidate for IV sedation.    Multiple opportunities were provided for any questions or concerns, and all questions were answered satisfactorily before any anesthesia was administered. We will proceed with the planned procedure.    Past Medical History:  Past Medical History:   Diagnosis Date   • Anxiety and depression    • Arthritis    • Breast injury    • Fibrocystic breast    • GERD (gastroesophageal reflux disease)    • Hypertension    • Morbid obesity (CMS/HCC)        Past Surgical History:  Past Surgical History:   Procedure Laterality Date   • ABDOMINAL SURGERY     • ENDOSCOPY N/A 8/15/2019    Procedure: ESOPHAGOGASTRODUODENOSCOPY W/BIOPSY;  Surgeon: Lny Gan MD;  Location: [facility] ENDOSCOPY;  Service: General   • GASTRIC BANDING REMOVAL N/A 10/17/2019    Procedure: GASTRIC BANDING REMOVAL LAPAROSCOPIC;  Surgeon: Lyn Gan  MD;  Location: [facility] MAIN OR;  Service: General   • LAPAROSCOPIC GASTRIC BANDING     • LAPAROSCOPIC TUBAL LIGATION         Social History:  Social History     Tobacco Use   • Smoking status: Former Smoker     Last attempt to quit: 2000     Years since quittin.6   • Smokeless tobacco: Never Used   Substance Use Topics   • Alcohol use: Yes     Frequency: Never     Comment: SELDOM   • Drug use: No       Family History:  Family History   Problem Relation Age of Onset   • Hypertension Mother    • Cancer Mother    • Hypertension Father    • Heart attack Father    • Hypertension Sister    • Cancer Maternal Grandmother    • Cancer Maternal Grandfather        Medications:  Medications Prior to Admission   Medication Sig Dispense Refill Last Dose   • ferrous sulfate 325 (65 FE) MG tablet Take 325 mg by mouth Daily With Breakfast.   2020   • FLUoxetine (PROzac) 40 MG capsule Take 40 mg by mouth Daily.  5 2020 at Unknown time   • lisinopril-hydrochlorothiazide (PRINZIDE,ZESTORETIC) 20-25 MG per tablet Take 1 tablet by mouth Daily. for blood pressure.  2 2020 at Unknown time   • meclizine (ANTIVERT) 25 MG tablet Take 1 tablet by mouth 3 (Three) Times a Day As Needed for Dizziness or Nausea. 15 tablet 0 Past Month at Unknown time   • Multiple Vitamins-Calcium (ONE-A-DAY WOMENS PO) Take 1 tablet by mouth Daily.   2020 at Unknown time   • omeprazole (PrilOSEC) 40 MG capsule Take 1 capsule by mouth Daily. 30 capsule 6 2020 at Unknown time   • ondansetron ODT (ZOFRAN-ODT) 4 MG disintegrating tablet Place 4 mg on the tongue Every 8 (Eight) Hours As Needed for Nausea or Vomiting.   2020 at Unknown time   • [employer] FLUTICASONE PROPIONATE 50 MCG/ACT nasal spray SPRAY 1 SPRAY INTO EACH NOSTRIL TWICE DAILY FOR 7 DAYS THEN DECREASE TO 1 SPRAY DAILY FOR 7 DAYS   Taking   • Dextromethorphan-guaiFENesin (MUCUS RELIEF DM)  MG tablet sustained-release 12 hour TAKE 1 TABLET BY MOUTH EVERY 12 HOURS FOR 7 DAYS    "   • HYDROcodone-acetaminophen (NORCO) 5-325 MG per tablet Take 1 tablet by mouth Every 6 (Six) Hours As Needed for Moderate Pain . 30 tablet 0 Taking   • ibuprofen (ADVIL,MOTRIN) 200 MG tablet Take 800 mg by mouth Every 6 (Six) Hours As Needed for Mild Pain  (leg pain).   More than a month at Unknown time   • methocarbamol (ROBAXIN) 750 MG tablet Take 1 tablet by mouth 3 (Three) Times a Day. 30 tablet 0 10/14/2019   • omeprazole (priLOSEC) 20 MG capsule Take 20 mg by mouth Daily As Needed.   Taking   • ondansetron ODT (Zofran ODT) 4 MG disintegrating tablet Place 1 tablet under the tongue Every 8 (Eight) Hours As Needed for Nausea or Vomiting. 20 tablet 0    • ursodiol (ACTIGALL) 250 MG tablet Take 1 tablet by mouth 2 (Two) Times a Day. 30 tablet 6 Taking       Scheduled Meds:   Continuous Infusions:  sodium chloride 9 mL/hr Last Rate: 9 mL/hr (09/01/20 0922)     PRN Meds:.•  sodium chloride    ALLERGIES:  Antihistamines, diphenhydramine-type    ROS:  The following systems were reviewed and negative;  Constitution:  No fevers, chills, no unintentional weight loss  Skin: no rash, no jaundice  Eyes:  No blurry vision, no eye pain  HENT:  No change in hearing or smell  Resp:  No dyspnea or cough  CV:  No chest pain or palpitations  :  No dysuria, hematuria  Musculoskeletal:  No leg cramps or arthralgias  Neuro:  No tremor, no numbness  Psych:  No depression or confsuion    Objective     Vital Signs:   Vitals:    08/25/20 1144 09/01/20 0909   BP:  147/66   BP Location:  Left arm   Patient Position:  Lying   Pulse:  75   Resp:  18   SpO2:  96%   Weight: (!) 161 kg (355 lb) (!) 164 kg (361 lb 8.9 oz)   Height: 167.6 cm (66\") 167.6 cm (66\")       Physical Exam:      General Appearance:    Awake and alert, in no acute distress   Head:    Normocephalic, without obvious abnormality, atraumatic   Throat:   No oral lesions, no thrush, oral mucosa moist   Lungs:     respirations regular, even and unlabored   Skin:   No rash, " no jaundice       Results Review:  Lab Results (last 24 hours)     ** No results found for the last 24 hours. **          Imaging Results (Last 24 Hours)     ** No results found for the last 24 hours. **           I reviewed the patient's labs and imaging.    ASSESSMENT AND PLAN:  Iron deficiency anemia due to chronic GI blood loss    Active Problems:    * No active hospital problems. *       Procedure(s):  ESOPHAGOGASTRODUODENOSCOPY  COLONOSCOPY      I discussed the patients findings and my recommendations with the patient.    Giovanni Lara MD  09/01/20  09:51

## 2020-09-01 NOTE — ANESTHESIA POSTPROCEDURE EVALUATION
Patient: Kyle Pennington    Procedure Summary     Date:  09/01/20 Room / Location:  [facility] ENDOSCOPY 4 / [facility] ENDOSCOPY    Anesthesia Start:  0946 Anesthesia Stop:  1016    Procedures:       ESOPHAGOGASTRODUODENOSCOPY (N/A )      COLONOSCOPY to cecum and TI with cold snare polypectomy (N/A ) Diagnosis:       Occult blood in stools      Anemia      Iron deficiency anemia, unspecified      (Occult blood in stools [R19.5])      (Anemia [D64.9])      (Iron deficiency anemia, unspecified [D50.9])    Surgeon:  Giovanni Lara MD Provider:  Jose Guadalupe Dickinson MD    Anesthesia Type:  MAC ASA Status:  3          Anesthesia Type: MAC    Vitals  Vitals Value Taken Time   /79 9/1/2020 10:21 AM   Temp     Pulse 69 9/1/2020 10:25 AM   Resp 16 9/1/2020 10:20 AM   SpO2 100 % 9/1/2020 10:25 AM   Vitals shown include unvalidated device data.        Post Anesthesia Care and Evaluation    Patient location during evaluation: PACU  Patient participation: complete - patient participated  Level of consciousness: awake  Pain scale: See nurse's notes for pain score.  Pain management: adequate  Airway patency: patent  Anesthetic complications: No anesthetic complications  PONV Status: none  Cardiovascular status: acceptable  Respiratory status: acceptable  Hydration status: acceptable    Comments: Patient seen and examined postoperatively; vital signs stable; SpO2 greater than or equal to 90%; cardiopulmonary status stable; nausea/vomiting adequately controlled; pain adequately controlled; no apparent anesthesia complications; patient discharged from anesthesia care when discharge criteria were met

## 2020-09-02 LAB
LAB AP CASE REPORT: NORMAL
LAB AP CLINICAL INFORMATION: NORMAL
PATH REPORT.FINAL DX SPEC: NORMAL
PATH REPORT.GROSS SPEC: NORMAL

## 2020-09-27 ENCOUNTER — HOSPITAL ENCOUNTER (EMERGENCY)
Facility: HOSPITAL | Age: 40
Discharge: HOME | End: 2020-09-27
Admitting: EMERGENCY MEDICINE

## 2020-09-27 VITALS
RESPIRATION RATE: 16 BRPM | TEMPERATURE: 98.3 F | DIASTOLIC BLOOD PRESSURE: 83 MMHG | HEIGHT: 66 IN | SYSTOLIC BLOOD PRESSURE: 147 MMHG | HEART RATE: 75 BPM | BODY MASS INDEX: 40.04 KG/M2 | OXYGEN SATURATION: 94 % | WEIGHT: 249.12 LBS

## 2020-09-27 DIAGNOSIS — D64.9 FATIGUE ASSOCIATED WITH ANEMIA: Primary | ICD-10-CM

## 2020-09-27 LAB
ALBUMIN SERPL-MCNC: 3.8 G/DL (ref 3.5–5.2)
ALBUMIN/GLOB SERPL: 1.4 G/DL
ALP SERPL-CCNC: 116 U/L (ref 39–117)
ALT SERPL W P-5'-P-CCNC: 28 U/L (ref 1–33)
ANION GAP SERPL CALCULATED.3IONS-SCNC: 9 MMOL/L (ref 5–15)
AST SERPL-CCNC: 21 U/L (ref 1–32)
BASOPHILS # BLD AUTO: 0 10*3/MM3 (ref 0–0.2)
BASOPHILS NFR BLD AUTO: 0.5 % (ref 0–1.5)
BILIRUB SERPL-MCNC: 0.3 MG/DL (ref 0–1.2)
BUN SERPL-MCNC: 14 MG/DL (ref 6–20)
BUN SERPL-MCNC: ABNORMAL MG/DL
BUN/CREAT SERPL: ABNORMAL
CALCIUM SPEC-SCNC: 8.9 MG/DL (ref 8.6–10.5)
CHLORIDE SERPL-SCNC: 99 MMOL/L (ref 98–107)
CO2 SERPL-SCNC: 29 MMOL/L (ref 22–29)
CREAT SERPL-MCNC: 0.65 MG/DL (ref 0.57–1)
DEPRECATED RDW RBC AUTO: 49.4 FL (ref 37–54)
EOSINOPHIL # BLD AUTO: 0.1 10*3/MM3 (ref 0–0.4)
EOSINOPHIL NFR BLD AUTO: 2.1 % (ref 0.3–6.2)
ERYTHROCYTE [DISTWIDTH] IN BLOOD BY AUTOMATED COUNT: 19.5 % (ref 12.3–15.4)
GFR SERPL CREATININE-BSD FRML MDRD: 100 ML/MIN/1.73
GLOBULIN UR ELPH-MCNC: 2.7 GM/DL
GLUCOSE SERPL-MCNC: 100 MG/DL (ref 65–99)
HCT VFR BLD AUTO: 37.2 % (ref 34–46.6)
HGB BLD-MCNC: 11.5 G/DL (ref 12–15.9)
HOLD SPECIMEN: NORMAL
LYMPHOCYTES # BLD AUTO: 1.4 10*3/MM3 (ref 0.7–3.1)
LYMPHOCYTES NFR BLD AUTO: 23 % (ref 19.6–45.3)
MCH RBC QN AUTO: 22.2 PG (ref 26.6–33)
MCHC RBC AUTO-ENTMCNC: 31 G/DL (ref 31.5–35.7)
MCV RBC AUTO: 71.7 FL (ref 79–97)
MONOCYTES # BLD AUTO: 0.5 10*3/MM3 (ref 0.1–0.9)
MONOCYTES NFR BLD AUTO: 8.3 % (ref 5–12)
NEUTROPHILS NFR BLD AUTO: 4 10*3/MM3 (ref 1.7–7)
NEUTROPHILS NFR BLD AUTO: 66.1 % (ref 42.7–76)
NRBC BLD AUTO-RTO: 0.1 /100 WBC (ref 0–0.2)
PLATELET # BLD AUTO: 283 10*3/MM3 (ref 140–450)
PMV BLD AUTO: 8 FL (ref 6–12)
POTASSIUM SERPL-SCNC: 3.8 MMOL/L (ref 3.5–5.2)
PROT SERPL-MCNC: 6.5 G/DL (ref 6–8.5)
RBC # BLD AUTO: 5.18 10*6/MM3 (ref 3.77–5.28)
SODIUM SERPL-SCNC: 137 MMOL/L (ref 136–145)
TSH SERPL DL<=0.05 MIU/L-ACNC: 1.14 UIU/ML (ref 0.27–4.2)
WBC # BLD AUTO: 6 10*3/MM3 (ref 3.4–10.8)
WHOLE BLOOD HOLD SPECIMEN: NORMAL

## 2020-09-27 PROCEDURE — 80053 COMPREHEN METABOLIC PANEL: CPT | Performed by: NURSE PRACTITIONER

## 2020-09-27 PROCEDURE — 84443 ASSAY THYROID STIM HORMONE: CPT | Performed by: NURSE PRACTITIONER

## 2020-09-27 PROCEDURE — 99283 EMERGENCY DEPT VISIT LOW MDM: CPT

## 2020-09-27 PROCEDURE — 85025 COMPLETE CBC W/AUTO DIFF WBC: CPT | Performed by: NURSE PRACTITIONER

## 2020-09-27 RX ORDER — SODIUM CHLORIDE 0.9 % (FLUSH) 0.9 %
10 SYRINGE (ML) INJECTION AS NEEDED
Status: DISCONTINUED | OUTPATIENT
Start: 2020-09-27 | End: 2020-09-27 | Stop reason: HOSPADM

## 2020-09-29 ENCOUNTER — OFFICE VISIT (OUTPATIENT)
Dept: BARIATRICS/WEIGHT MGMT | Facility: CLINIC | Age: 40
End: 2020-09-29

## 2020-09-29 VITALS — BODY MASS INDEX: 47.09 KG/M2 | WEIGHT: 293 LBS | HEIGHT: 66 IN

## 2020-09-29 DIAGNOSIS — E66.9 SUPER OBESE: Primary | ICD-10-CM

## 2020-09-29 NOTE — PROGRESS NOTES
[facility]  [address] [place] [address]  [address] [place] [address]  Dept: [phone]  9/29/2020      Kyle Pennington.  [id number]  [id number]  1980  female      Chief Complaint   Patient presents with   • Obesity   • Nutrition Counseling   • Weight Loss       The patient is here for month SWL #7 of their pre-operative supervised weight loss visit. She had a loss of 1.6 lbs. Has partially met following goals: 1) Exercise goal partially met- 30 minutes 3x per week, 2) Journaling food daily-goal met, 3) Chewed food 15-20 times per bite. Patient is working on eliminating fast foods, fried foods, sweets and soda.  Kyle Pennington has been increasing her daily water intake. She has been exercising: walking.    Patient states they have made positive changes including chewing food, lowered portion size, no second servings, and drinking water (fruited water).    The patient admits to be struggling with health issues.    none     24 hr recall:  Breakfast: 2 boiled eggs, protein shake  Lunch: skip  Dinner: grilled chicken, white rice  Snacks: none  Drinks: water, decaf unsweet tea, powerade zero    Review of Systems  There were no vitals filed for this visit.  Patient Active Problem List   Diagnosis   • Morbid obesity (CMS/HCC)   • Advanced maternal age (AMA) in pregnancy   • Chronic hypertension in pregnancy   • History of laparoscopic adjustable gastric banding   • Twin pregnancy in second trimester   • BMI 50.0-59.9, adult (CMS/HCC)   • Pre-operative examination   • GERD (gastroesophageal reflux disease)     Body mass index is 57.2 kg/m².  Documented weights    09/29/20 1034   Weight: (!) 161 kg (354 lb 6.4 oz)     Weight change from last appointment:-1.6 lb  Weight change overall: +.4 lb     Goals:  Exercise  20-30 minutes for  3 day by next appointment and Maintain weight by next appointment      Discussion/Plan:  Obesity/Morbid Obesity: Currently the patient's weight is decreased. I reviewed the appropriate dietary choices with the patient and encouraged the necessary changes. Recommended at least 60 grams of protein per day, around 33 grams of fats and less than 100 grams of carbohydrates. Reviewed calorie intake if patient wanted to calorie count and/or had BMR. Instructed patient to drink 64 ounces of water per day and exercise a minimum of 150 minutes per week including both cardio and strength training.  Discussed with pt also eating and drinking separately stopping 30 minutes before meals and 45 minutes after meals. Discussed the option of keeping a food journal which will help patient become more aware of the nutritional value of foods so they are more prepared after surgery.    The patient was given written materials from our office for education.   I answered all of the patients questions regarding dietary changes, exercise or surgical options.  Future Appointments   Date Time Provider Department Center   10/9/2020 10:00 AM Ledy Acosta RD [facility] None     The patient will follow up in one month on.    PES:     The total time spent face to face was 20 minutes with 20 minutes spent counseling.

## 2020-09-30 ENCOUNTER — LAB (OUTPATIENT)
Dept: LAB | Facility: HOSPITAL | Age: 40
End: 2020-09-30

## 2020-09-30 PROCEDURE — 36415 COLL VENOUS BLD VENIPUNCTURE: CPT

## 2020-09-30 PROCEDURE — 84425 ASSAY OF VITAMIN B-1: CPT

## 2020-10-03 LAB — VIT B1 BLD-SCNC: 124.5 NMOL/L (ref 66.5–200)

## 2020-10-09 ENCOUNTER — OFFICE VISIT (OUTPATIENT)
Dept: BARIATRICS/WEIGHT MGMT | Facility: CLINIC | Age: 40
End: 2020-10-09

## 2020-10-09 VITALS — BODY MASS INDEX: 47.09 KG/M2 | WEIGHT: 293 LBS | HEIGHT: 66 IN

## 2020-10-09 DIAGNOSIS — E66.9 SUPER OBESE: Primary | ICD-10-CM

## 2020-10-09 NOTE — PROGRESS NOTES
[facility] [place] [facility]  [address] [place] [address]  [address] [place] [address]  Dept: [phone]  10/9/2020      Kyle Pennington.  [id number]  [id number]  1980  female      No chief complaint on file.      The patient is here for month SWL #8 of their pre-operative supervised weight loss visit. She had a loss of 0 lbs. Has met following goals: 1) exercise 20-30 minutes 3x per wk-goal met, 2) Maintained weight. Patient is working on eliminating fast foods, fried foods, sweets and soda.  Kyle Pennington has been increasing her daily water intake. She has been exercising: walking.    Patient states they have made positive changes including feeling physically better.  The patient admits to be struggling with not feeling hunger.     lack of hunger    24 hr recall:  Breakfast: Oatmeal with strawberries  Lunch: Grilled chicken with stir fried vegetables  Dinner: Cauliflower Pizza   Snack: yogurt   Drink: Poweraid Zero and water    Review of Systems  There were no vitals filed for this visit.  Patient Active Problem List   Diagnosis   • Morbid obesity (CMS/HCC)   • Advanced maternal age (AMA) in pregnancy   • Chronic hypertension in pregnancy   • History of laparoscopic adjustable gastric banding   • Twin pregnancy in second trimester   • BMI 50.0-59.9, adult (CMS/HCC)   • Pre-operative examination   • GERD (gastroesophageal reflux disease)     There is no height or weight on file to calculate BMI.  There were no vitals filed for this visit.  Weight change from last appointment: 0 lb  Weight change overall:+ .4 lb     Goals:  Exercise  20-30 minutes for  3 day by next appointment, and Maintain body weight    Discussion/Plan:  Obesity/Morbid Obesity: Currently the patient's weight is stable. I reviewed the appropriate dietary choices with the patient and encouraged the necessary changes. Recommended at least 60 grams of  protein per day, around 33 grams of fats and less than 100 grams of carbohydrates. Reviewed calorie intake if patient wanted to calorie count and/or had BMR. Instructed patient to drink 64 ounces of water per day and exercise a minimum of 150 minutes per week including both cardio and strength training.  Discussed with pt also eating and drinking separately stopping 30 minutes before meals and 45 minutes after meals. Discussed the option of keeping a food journal which will help patient become more aware of the nutritional value of foods so they are more prepared after surgery.    The patient was given written materials from our office for education.   I answered all of the patients questions regarding dietary changes, exercise or surgical options.  Future Appointments   Date Time Provider Department Center   10/21/2020 11:00 AM Sisi Gaviria MD [facility]   10/21/2020 11:00 AM [facility]     The patient will follow up in one month on.        The total time spent face to face was 20 minutes with 20 minutes spent counseling.

## 2020-10-16 NOTE — PROGRESS NOTES
Hematology/Oncology Outpatient Consultation    Patient name: Kyle Pennington  : 1980  MRN: [id number]  Primary Care Physician: Geeta Hathaway APRN  Referring Physician: Geeta Hathaway APRN  Reason For Consult:     Chief Complaint   Patient presents with   • Appointment     Consultation for Anemia     This is a 40-year-old female who has been referred secondary to anemia.  Patient has a history of having gastric sleeve surgery in .  At that time she received IV iron infusion.  Over the course of time she has become progressively anemic.  Over the past 6 months she has been on oral iron supplementation.    Review of her CBC from 2020, white count was 6, hemoglobin 11.5, MCV was 71 and platelets were 283.  Differentials were 60% neutrophils, 23% lymphocytes, there was no monocytosis, eosinophilia or basophilia.  Patient had a chemistry panel creatinine was 0.65 and liver function tests were normal.  Review of her CBC from 2020 white count was 6.8, hemoglobin 9.5 and platelets were 334.  2020 white count was 9.2, hemoglobin 10.3 and platelets with 391.  On 2020 patient had a EGD and colonoscopy done which showed hiatal hernia and transverse colonic polyp.  This was performed by Dr. Lara.    She also has a history of hematuria for which she was [facility]  She has had menorrhagia and is scheduled for partial hysterectomy on 2020 by Dr. Vasquez.    Patient has been referred due to anemia.  Complains of extreme tiredness    Past Medical History:   Diagnosis Date   • Anxiety and depression    • Arthritis    • Breast injury    • Fibrocystic breast    • GERD (gastroesophageal reflux disease)    • Hypertension    • Morbid obesity (CMS/HCC)        Past Surgical History:   Procedure Laterality Date   • ABDOMINAL SURGERY     • COLONOSCOPY N/A 2020    Procedure: COLONOSCOPY to cecum and TI with cold snare polypectomy;  Surgeon: Giovanni Lara  MD Flo;  Location: [facility] ENDOSCOPY;  Service: Gastroenterology;  Laterality: N/A;  pre-iron def anemia  post-polyp   • ENDOSCOPY N/A 8/15/2019    Procedure: ESOPHAGOGASTRODUODENOSCOPY W/BIOPSY;  Surgeon: Lyn Gan MD;  Location: [facility] ENDOSCOPY;  Service: General   • ENDOSCOPY N/A 9/1/2020    Procedure: ESOPHAGOGASTRODUODENOSCOPY;  Surgeon: Giovanni Lara MD;  Location: [facility] ENDOSCOPY;  Service: Gastroenterology;  Laterality: N/A;  pre-iron def anemia  post-hiatal hernia   • GASTRIC BANDING REMOVAL N/A 10/17/2019    Procedure: GASTRIC BANDING REMOVAL LAPAROSCOPIC;  Surgeon: Lyn Gan MD;  Location: [facility] MAIN OR;  Service: General   • LAPAROSCOPIC GASTRIC BANDING     • LAPAROSCOPIC TUBAL LIGATION           Current Outpatient Medications:   •  ferrous sulfate 325 (65 FE) MG tablet, Take 325 mg by mouth Daily With Breakfast., Disp: , Rfl:   •  ibuprofen (ADVIL,MOTRIN) 200 MG tablet, Take 800 mg by mouth Every 6 (Six) Hours As Needed for Mild Pain  (leg pain)., Disp: , Rfl:   •  lisinopril-hydrochlorothiazide (PRINZIDE,ZESTORETIC) 20-25 MG per tablet, Take 1 tablet by mouth Daily. for blood pressure., Disp: , Rfl: 2  •  Multiple Vitamins-Calcium (ONE-A-DAY WOMENS PO), Take 1 tablet by mouth Daily., Disp: , Rfl:   •  omeprazole (PrilOSEC) 40 MG capsule, Take 1 capsule by mouth Daily., Disp: 30 capsule, Rfl: 6  •  FLUoxetine (PROzac) 40 MG capsule, Take 40 mg by mouth Daily., Disp: , Rfl: 5  •  meclizine (ANTIVERT) 25 MG tablet, Take 1 tablet by mouth 3 (Three) Times a Day As Needed for Dizziness or Nausea., Disp: 15 tablet, Rfl: 0  •  ondansetron ODT (ZOFRAN-ODT) 4 MG disintegrating tablet, Place 4 mg on the tongue Every 8 (Eight) Hours As Needed for Nausea or Vomiting., Disp: , Rfl:     Allergies   Allergen Reactions   • Antihistamines, Diphenhydramine-Type Other (See Comments)     Bloody nose         There is no immunization history on file for this patient.    Family History   Problem Relation  Age of Onset   • Hypertension Mother    • Cancer Mother    • Skin cancer Mother    • Hypertension Father    • Heart attack Father    • Hypertension Sister    • Cancer Maternal Grandmother    • Lung cancer Maternal Grandmother    • Cancer Maternal Grandfather        Cancer-related family history includes Cancer in her maternal grandfather, maternal grandmother, and mother; Lung cancer in her maternal grandmother; Skin cancer in her mother.    Social History     Tobacco Use   • Smoking status: Former Smoker     Quit date: 2000     Years since quittin.8   • Smokeless tobacco: Never Used   Substance Use Topics   • Alcohol use: Yes     Frequency: Never     Comment: 2 drinks per week   • Drug use: No       ROS:    Review of Systems   Constitutional: Positive for fatigue. Negative for chills and fever.   HENT: Negative for ear pain, mouth sores, nosebleeds and sore throat.    Eyes: Negative for photophobia and visual disturbance.   Respiratory: Negative for wheezing and stridor.    Cardiovascular: Negative for chest pain and palpitations.   Gastrointestinal: Negative for abdominal pain, diarrhea, nausea and vomiting.   Endocrine: Negative for cold intolerance and heat intolerance.   Genitourinary: Negative for dysuria and hematuria.   Musculoskeletal: Negative for joint swelling and neck stiffness.   Skin: Negative for color change and rash.   Neurological: Negative for seizures and syncope.   Hematological: Negative for adenopathy.        No obvious bleeding   Psychiatric/Behavioral: Negative for agitation, confusion and hallucinations.       Objective:    Vitals:    10/21/20 1040   BP: 173/90   Pulse: 72   Resp: 20   Temp: 96.9 °F (36.1 °C)   TempSrc: Temporal   PainSc: 0-No pain     There is no height or weight on file to calculate BMI.    ECOG  (0) Fully active, able to carry on all predisease performance without restriction    Physical Exam:  Physical Exam  Vitals signs and nursing note reviewed.    Constitutional:       General: She is not in acute distress.     Appearance: She is not diaphoretic.   HENT:      Head: Normocephalic and atraumatic.   Eyes:      General: No scleral icterus.        Right eye: No discharge.         Left eye: No discharge.      Conjunctiva/sclera: Conjunctivae normal.   Neck:      Musculoskeletal: Normal range of motion and neck supple.      Thyroid: No thyromegaly.   Cardiovascular:      Rate and Rhythm: Normal rate and regular rhythm.      Heart sounds: Normal heart sounds. No friction rub. No gallop.    Pulmonary:      Effort: Pulmonary effort is normal. No respiratory distress.      Breath sounds: No stridor. No wheezing.   Abdominal:      General: Bowel sounds are normal.      Palpations: Abdomen is soft. There is no mass.      Tenderness: There is no abdominal tenderness. There is no guarding or rebound.   Musculoskeletal: Normal range of motion.         General: No tenderness.   Lymphadenopathy:      Cervical: No cervical adenopathy.   Skin:     General: Skin is warm.      Findings: No erythema or rash.   Neurological:      Mental Status: She is alert and oriented to person, place, and time.      Motor: No abnormal muscle tone.   Psychiatric:         Behavior: Behavior normal.         RECENT LABS  WBC   Date Value Ref Range Status   09/27/2020 6.00 3.40 - 10.80 10*3/mm3 Final     RBC   Date Value Ref Range Status   09/27/2020 5.18 3.77 - 5.28 10*6/mm3 Final     Hemoglobin   Date Value Ref Range Status   09/27/2020 11.5 (L) 12.0 - 15.9 g/dL Final     Hematocrit   Date Value Ref Range Status   09/27/2020 37.2 34.0 - 46.6 % Final     MCV   Date Value Ref Range Status   09/27/2020 71.7 (L) 79.0 - 97.0 fL Final     MCH   Date Value Ref Range Status   09/27/2020 22.2 (L) 26.6 - 33.0 pg Final     MCHC   Date Value Ref Range Status   09/27/2020 31.0 (L) 31.5 - 35.7 g/dL Final     RDW   Date Value Ref Range Status   09/27/2020 19.5 (H) 12.3 - 15.4 % Final     RDW-SD   Date Value Ref  Range Status   09/27/2020 49.4 37.0 - 54.0 fl Final     MPV   Date Value Ref Range Status   09/27/2020 8.0 6.0 - 12.0 fL Final     Platelets   Date Value Ref Range Status   09/27/2020 283 140 - 450 10*3/mm3 Final     Neutrophil %   Date Value Ref Range Status   09/27/2020 66.1 42.7 - 76.0 % Final     Lymphocyte %   Date Value Ref Range Status   09/27/2020 23.0 19.6 - 45.3 % Final     Monocyte %   Date Value Ref Range Status   09/27/2020 8.3 5.0 - 12.0 % Final     Eosinophil %   Date Value Ref Range Status   09/27/2020 2.1 0.3 - 6.2 % Final     Basophil %   Date Value Ref Range Status   09/27/2020 0.5 0.0 - 1.5 % Final     Neutrophils, Absolute   Date Value Ref Range Status   09/27/2020 4.00 1.70 - 7.00 10*3/mm3 Final     Lymphocytes, Absolute   Date Value Ref Range Status   09/27/2020 1.40 0.70 - 3.10 10*3/mm3 Final     Monocytes, Absolute   Date Value Ref Range Status   09/27/2020 0.50 0.10 - 0.90 10*3/mm3 Final     Eosinophils, Absolute   Date Value Ref Range Status   09/27/2020 0.10 0.00 - 0.40 10*3/mm3 Final     Basophils, Absolute   Date Value Ref Range Status   09/27/2020 0.00 0.00 - 0.20 10*3/mm3 Final     nRBC   Date Value Ref Range Status   09/27/2020 0.1 0.0 - 0.2 /100 WBC Final       Lab Results   Component Value Date    GLUCOSE 100 (H) 09/27/2020    BUN  09/27/2020      Comment:      Testing performed by alternate method    BUN 14 09/27/2020    CREATININE 0.65 09/27/2020    EGFRIFNONA 100 09/27/2020    BCR  09/27/2020      Comment:      Testing not performed    K 3.8 09/27/2020    CO2 29.0 09/27/2020    CALCIUM 8.9 09/27/2020    ALBUMIN 3.80 09/27/2020    AST 21 09/27/2020    ALT 28 09/27/2020       Assessment    1. Microcytic anemia likely due to iron deficiency or anemia of chronic disease  2. History of gastric sleeve surgery  3. Menorrhagia: Patient is scheduled for partial hysterectomy in the near future  4. History of hematuria: Seen by Dr. Ricks        Plans    · Complete anemia  work-up  · Follow up 2 weeks  · If iron deficiency is confirmed, would administer iron by IV  · Discussed with patient and her mother who is present today      Thank you very much for allowing us to participate in the care of Kyle, I will keep you updated on her progress      Patient verbalized understanding and is in agreement of the above plan.

## 2020-10-19 ENCOUNTER — TELEPHONE (OUTPATIENT)
Dept: ONCOLOGY | Facility: CLINIC | Age: 40
End: 2020-10-19

## 2020-10-19 NOTE — TELEPHONE ENCOUNTER
Left message for patient that it is okay for her mother to come back for her first appointment. I also let her know that due to covid our protocol is just to let them back for the first visit and not further than that. Let her know to call back with any questions.

## 2020-10-19 NOTE — TELEPHONE ENCOUNTER
Caller: GI JONAS    Relationship to patient: SELF    Best call back number: [phone]    PT IS REQUESTING THAT HER MOM BE ALLOWED TO COME TO HER APPT ON 10/21

## 2020-10-21 ENCOUNTER — CONSULT (OUTPATIENT)
Dept: ONCOLOGY | Facility: CLINIC | Age: 40
End: 2020-10-21

## 2020-10-21 VITALS
HEART RATE: 72 BPM | SYSTOLIC BLOOD PRESSURE: 173 MMHG | DIASTOLIC BLOOD PRESSURE: 90 MMHG | RESPIRATION RATE: 20 BRPM | TEMPERATURE: 96.9 F

## 2020-10-21 DIAGNOSIS — D64.9 ANEMIA, UNSPECIFIED TYPE: Primary | ICD-10-CM

## 2020-10-21 PROCEDURE — 99204 OFFICE O/P NEW MOD 45 MIN: CPT | Performed by: INTERNAL MEDICINE

## 2020-10-22 RX ORDER — OMEPRAZOLE 40 MG/1
CAPSULE, DELAYED RELEASE ORAL
Qty: 30 CAPSULE | Refills: 6 | Status: SHIPPED | OUTPATIENT
Start: 2020-10-22 | End: 2021-05-05

## 2020-11-04 ENCOUNTER — OFFICE VISIT (OUTPATIENT)
Dept: ONCOLOGY | Facility: CLINIC | Age: 40
End: 2020-11-04

## 2020-11-04 VITALS
RESPIRATION RATE: 18 BRPM | HEIGHT: 66 IN | BODY MASS INDEX: 57.17 KG/M2 | TEMPERATURE: 97.3 F | DIASTOLIC BLOOD PRESSURE: 99 MMHG | SYSTOLIC BLOOD PRESSURE: 172 MMHG | HEART RATE: 79 BPM

## 2020-11-04 DIAGNOSIS — D64.9 ANEMIA, UNSPECIFIED TYPE: Primary | ICD-10-CM

## 2020-11-04 PROCEDURE — 99214 OFFICE O/P EST MOD 30 MIN: CPT | Performed by: INTERNAL MEDICINE

## 2020-11-04 RX ORDER — FOLIC ACID 1 MG/1
1 TABLET ORAL DAILY
Qty: 30 TABLET | Refills: 2 | Status: SHIPPED | OUTPATIENT
Start: 2020-11-04 | End: 2021-10-29

## 2020-11-10 ENCOUNTER — TELEPHONE (OUTPATIENT)
Dept: ONCOLOGY | Facility: CLINIC | Age: 40
End: 2020-11-10

## 2020-11-10 PROBLEM — K90.9 IRON MALABSORPTION: Status: ACTIVE | Noted: 2020-11-10

## 2020-11-10 PROBLEM — D50.9 IRON DEFICIENCY ANEMIA: Status: ACTIVE | Noted: 2020-11-10

## 2020-11-10 NOTE — TELEPHONE ENCOUNTER
Pt asked to schedule infusion appt.    She said Dr. Gaviria would schedule her for iron infusion. She has surgery 11.24.20 and asked to have the infusion scheduled before getting surgery done.    Thurs after 11am    [phone] PH  [phone] PH 2

## 2020-11-12 ENCOUNTER — HOSPITAL ENCOUNTER (OUTPATIENT)
Dept: ONCOLOGY | Facility: HOSPITAL | Age: 40
Setting detail: INFUSION SERIES
Discharge: HOME | End: 2020-11-12

## 2020-11-12 VITALS
WEIGHT: 293 LBS | HEIGHT: 66 IN | HEART RATE: 69 BPM | BODY MASS INDEX: 47.09 KG/M2 | DIASTOLIC BLOOD PRESSURE: 83 MMHG | SYSTOLIC BLOOD PRESSURE: 138 MMHG | OXYGEN SATURATION: 99 % | RESPIRATION RATE: 18 BRPM | TEMPERATURE: 97.3 F

## 2020-11-12 DIAGNOSIS — K90.9 IRON MALABSORPTION: ICD-10-CM

## 2020-11-12 DIAGNOSIS — D50.9 IRON DEFICIENCY ANEMIA, UNSPECIFIED IRON DEFICIENCY ANEMIA TYPE: Primary | ICD-10-CM

## 2020-11-12 PROCEDURE — 25010000002 FERRIC CARBOXYMALTOSE 750 MG/15ML SOLUTION 15 ML VIAL: Performed by: INTERNAL MEDICINE

## 2020-11-12 PROCEDURE — 96365 THER/PROPH/DIAG IV INF INIT: CPT

## 2020-11-12 PROCEDURE — 36415 COLL VENOUS BLD VENIPUNCTURE: CPT

## 2020-11-12 RX ORDER — SODIUM CHLORIDE 9 MG/ML
250 INJECTION, SOLUTION INTRAVENOUS ONCE
Status: COMPLETED | OUTPATIENT
Start: 2020-11-12 | End: 2020-11-12

## 2020-11-12 RX ORDER — BUPROPION HYDROCHLORIDE 150 MG/1
150 TABLET ORAL EVERY MORNING
COMMUNITY
Start: 2020-10-28

## 2020-11-12 RX ADMIN — SODIUM CHLORIDE 750 MG: 900 INJECTION, SOLUTION INTRAVENOUS at 13:17

## 2020-11-12 RX ADMIN — SODIUM CHLORIDE 250 ML: 9 INJECTION, SOLUTION INTRAVENOUS at 13:17

## 2020-11-19 ENCOUNTER — HOSPITAL ENCOUNTER (OUTPATIENT)
Dept: ONCOLOGY | Facility: HOSPITAL | Age: 40
Setting detail: INFUSION SERIES
Discharge: HOME | End: 2020-11-19

## 2020-11-19 VITALS
WEIGHT: 293 LBS | HEART RATE: 85 BPM | HEIGHT: 66 IN | SYSTOLIC BLOOD PRESSURE: 134 MMHG | DIASTOLIC BLOOD PRESSURE: 80 MMHG | BODY MASS INDEX: 47.09 KG/M2 | RESPIRATION RATE: 18 BRPM | TEMPERATURE: 98.6 F

## 2020-11-19 DIAGNOSIS — K90.9 IRON MALABSORPTION: ICD-10-CM

## 2020-11-19 DIAGNOSIS — D50.9 IRON DEFICIENCY ANEMIA, UNSPECIFIED IRON DEFICIENCY ANEMIA TYPE: Primary | ICD-10-CM

## 2020-11-19 PROCEDURE — 96365 THER/PROPH/DIAG IV INF INIT: CPT

## 2020-11-19 PROCEDURE — 36415 COLL VENOUS BLD VENIPUNCTURE: CPT

## 2020-11-19 PROCEDURE — 25010000002 FERRIC CARBOXYMALTOSE 750 MG/15ML SOLUTION 15 ML VIAL: Performed by: INTERNAL MEDICINE

## 2020-11-19 RX ORDER — SODIUM CHLORIDE 9 MG/ML
250 INJECTION, SOLUTION INTRAVENOUS ONCE
Status: DISCONTINUED | OUTPATIENT
Start: 2020-11-19 | End: 2020-11-20 | Stop reason: HOSPADM

## 2020-11-19 RX ADMIN — SODIUM CHLORIDE 750 MG: 900 INJECTION, SOLUTION INTRAVENOUS at 12:57

## 2020-12-02 ENCOUNTER — TELEPHONE (OUTPATIENT)
Dept: ONCOLOGY | Facility: HOSPITAL | Age: 40
End: 2020-12-02

## 2020-12-02 ENCOUNTER — OFFICE VISIT (OUTPATIENT)
Dept: ONCOLOGY | Facility: CLINIC | Age: 40
End: 2020-12-02

## 2020-12-02 VITALS
DIASTOLIC BLOOD PRESSURE: 60 MMHG | TEMPERATURE: 97.5 F | HEIGHT: 66 IN | RESPIRATION RATE: 18 BRPM | SYSTOLIC BLOOD PRESSURE: 156 MMHG | HEART RATE: 76 BPM | BODY MASS INDEX: 60.69 KG/M2

## 2020-12-02 DIAGNOSIS — D50.9 IRON DEFICIENCY ANEMIA, UNSPECIFIED IRON DEFICIENCY ANEMIA TYPE: Primary | ICD-10-CM

## 2020-12-02 PROCEDURE — 99214 OFFICE O/P EST MOD 30 MIN: CPT | Performed by: INTERNAL MEDICINE

## 2020-12-02 NOTE — PROGRESS NOTES
Hematology/Oncology Outpatient Follow Up    PATIENT NAME:Kyle Pennington  :1980  MRN: [id number]  PRIMARY CARE PHYSICIAN: Geeta Hathaway APRN  REFERRING PHYSICIAN: Geeta Hathaway APRN    Chief Complaint   Patient presents with   • Follow-up     anemia        HISTORY OF PRESENT ILLNESS:     This is a 40-year-old female who has been referred secondary to anemia.  Patient has a history of having gastric sleeve surgery in .  At that time she received IV iron infusion.  Over the course of time she has become progressively anemic.  Over the past 6 months she has been on oral iron supplementation.     Review of her CBC from 2020, white count was 6, hemoglobin 11.5, MCV was 71 and platelets were 283.  Differentials were 60% neutrophils, 23% lymphocytes, there was no monocytosis, eosinophilia or basophilia.  Patient had a chemistry panel creatinine was 0.65 and liver function tests were normal.  Review of her CBC from 2020 white count was 6.8, hemoglobin 9.5 and platelets were 334.  2020 white count was 9.2, hemoglobin 10.3 and platelets with 391.  On 2020 patient had a EGD and colonoscopy done which showed hiatal hernia and transverse colonic polyp.  This was performed by Dr. Lara.     She also has a history of hematuria for which she was [facility].  She has had menorrhagia and is scheduled for partial hysterectomy on 2020 by Dr. Vasquez.     Patient has been referred due to anemia.  Complains of extreme tiredness    · 10/21/2020 patient had anemia labs done.  White count was 6.2, hemoglobin 11.8, MCV was low at 76 and platelets were 3 20,000.  Differential essentially unremarkable.  SPEP with BRITTON did not show any evidence of monoclonal protein.  Serum ferritin was low at 12, B12 was low normal at 292 and folate was low normal at 6.  Reticulocyte count was normal at 1.4%.  · Patient is scheduled for partial hysterectomy to be performed by  Dr. Vasquez on 11/24/2020 but was denied by her insurance company.  She is now scheduled for D&C first week in January  · 11/4/2020 she had a methylmalonic acid level which was normal, urinalysis was negative  · 11/12/2020 patient had IV Injectafer and also on 11/19/2020      Past Medical History:   Diagnosis Date   • Anxiety and depression    • Arthritis    • Breast injury    • Fibrocystic breast    • GERD (gastroesophageal reflux disease)    • Hypertension    • Morbid obesity (CMS/HCC)        Past Surgical History:   Procedure Laterality Date   • ABDOMINAL SURGERY     • COLONOSCOPY N/A 9/1/2020    Procedure: COLONOSCOPY to cecum and TI with cold snare polypectomy;  Surgeon: Giovanni Lara MD;  Location: [facility] ENDOSCOPY;  Service: Gastroenterology;  Laterality: N/A;  pre-iron def anemia  post-polyp   • ENDOSCOPY N/A 8/15/2019    Procedure: ESOPHAGOGASTRODUODENOSCOPY W/BIOPSY;  Surgeon: Lyn Gan MD;  Location: [facility] ENDOSCOPY;  Service: General   • ENDOSCOPY N/A 9/1/2020    Procedure: ESOPHAGOGASTRODUODENOSCOPY;  Surgeon: Giovanni Lara MD;  Location: [facility] ENDOSCOPY;  Service: Gastroenterology;  Laterality: N/A;  pre-iron def anemia  post-hiatal hernia   • GASTRIC BANDING REMOVAL N/A 10/17/2019    Procedure: GASTRIC BANDING REMOVAL LAPAROSCOPIC;  Surgeon: Lyn Gan MD;  Location: [facility] MAIN OR;  Service: General   • LAPAROSCOPIC GASTRIC BANDING     • LAPAROSCOPIC TUBAL LIGATION           Current Outpatient Medications:   •  buPROPion XL (WELLBUTRIN XL) 150 MG 24 hr tablet, Take 150 mg by mouth Daily., Disp: , Rfl:   •  folic acid (FOLVITE) 1 MG tablet, Take 1 tablet by mouth Daily., Disp: 30 tablet, Rfl: 2  •  ibuprofen (ADVIL,MOTRIN) 200 MG tablet, Take 800 mg by mouth Every 6 (Six) Hours As Needed for Mild Pain  (leg pain)., Disp: , Rfl:   •  lisinopril-hydrochlorothiazide (PRINZIDE,ZESTORETIC) 20-25 MG per tablet, Take 1 tablet by mouth Daily. for blood pressure., Disp: , Rfl: 2  •   Multiple Vitamins-Calcium (ONE-A-DAY WOMENS PO), Take 1 tablet by mouth Daily., Disp: , Rfl:   •  omeprazole (priLOSEC) 40 MG capsule, TAKE 1 CAPSULE BY MOUTH EVERY DAY, Disp: 30 capsule, Rfl: 6  •  ondansetron ODT (ZOFRAN-ODT) 4 MG disintegrating tablet, Place 4 mg on the tongue Every 8 (Eight) Hours As Needed for Nausea or Vomiting., Disp: , Rfl:     Allergies   Allergen Reactions   • Antihistamines, Diphenhydramine-Type Other (See Comments)     Bloody nose       Family History   Problem Relation Age of Onset   • Hypertension Mother    • Cancer Mother    • Skin cancer Mother    • Hypertension Father    • Heart attack Father    • Hypertension Sister    • Cancer Maternal Grandmother    • Lung cancer Maternal Grandmother    • Cancer Maternal Grandfather        Cancer-related family history includes Cancer in her maternal grandfather, maternal grandmother, and mother; Lung cancer in her maternal grandmother; Skin cancer in her mother.    Social History     Tobacco Use   • Smoking status: Former Smoker     Quit date:      Years since quittin.9   • Smokeless tobacco: Never Used   Substance Use Topics   • Alcohol use: Yes     Frequency: Never     Comment: 2 drinks per week   • Drug use: No       HPI, ROS and PFSH have been reviewed and confirmed on 2020.     SUBJECTIVE:    She has no specific complaints today.  She does not have any specific complaints today.  She is back on her..  Patient has stopped oral iron supplementation.        REVIEW OF SYSTEMS:  Review of Systems   Constitutional: Negative for chills and fever.   HENT: Negative for ear pain, mouth sores, nosebleeds and sore throat.    Eyes: Negative for photophobia and visual disturbance.   Respiratory: Negative for wheezing and stridor.    Cardiovascular: Negative for chest pain and palpitations.   Gastrointestinal: Negative for abdominal pain, diarrhea, nausea and vomiting.   Endocrine: Negative for cold intolerance and heat intolerance.  "  Genitourinary: Negative for dysuria and hematuria.   Musculoskeletal: Negative for joint swelling and neck stiffness.   Skin: Negative for color change and rash.   Neurological: Negative for seizures and syncope.   Hematological: Negative for adenopathy.        Increased menstrual flow   Psychiatric/Behavioral: Negative for agitation, confusion and hallucinations.     I have reviewed and confirmed the accuracy of the ROS as documented by the MA/LPN/RN Sisi Gaviria MD    OBJECTIVE:    Vitals:    12/02/20 0954   BP: 156/60   Pulse: 76   Resp: 18   Temp: 97.5 °F (36.4 °C)   TempSrc: Temporal   Height: 167.6 cm (66\")   PainSc: 0-No pain     Body mass index is 60.69 kg/m².    ECOG  (1) Restricted in physically strenuous activity, ambulatory and able to do work of light nature    Physical Exam  Constitutional:       Appearance: Normal appearance. She is not toxic-appearing or diaphoretic.   HENT:      Head: Normocephalic and atraumatic.      Right Ear: External ear normal.      Left Ear: External ear normal.      Nose: Nose normal.      Mouth/Throat:      Mouth: Mucous membranes are moist.   Eyes:      Extraocular Movements: Extraocular movements intact.      Conjunctiva/sclera: Conjunctivae normal.      Pupils: Pupils are equal, round, and reactive to light.   Pulmonary:      Effort: Pulmonary effort is normal.   Musculoskeletal: Normal range of motion.   Neurological:      General: No focal deficit present.      Mental Status: She is alert and oriented to person, place, and time.   Psychiatric:         Mood and Affect: Mood normal.         Behavior: Behavior normal.         Thought Content: Thought content normal.         Judgment: Judgment normal.     I have reexamined the patient and the results are consistent with the previously documented exam. Sisi Gaviria MD     RECENT LABS    WBC   Date Value Ref Range Status   09/27/2020 6.00 3.40 - 10.80 10*3/mm3 Final     RBC   Date Value Ref Range Status "   09/27/2020 5.18 3.77 - 5.28 10*6/mm3 Final     Hemoglobin   Date Value Ref Range Status   09/27/2020 11.5 (L) 12.0 - 15.9 g/dL Final     Hematocrit   Date Value Ref Range Status   09/27/2020 37.2 34.0 - 46.6 % Final     MCV   Date Value Ref Range Status   09/27/2020 71.7 (L) 79.0 - 97.0 fL Final     MCH   Date Value Ref Range Status   09/27/2020 22.2 (L) 26.6 - 33.0 pg Final     MCHC   Date Value Ref Range Status   09/27/2020 31.0 (L) 31.5 - 35.7 g/dL Final     RDW   Date Value Ref Range Status   09/27/2020 19.5 (H) 12.3 - 15.4 % Final     RDW-SD   Date Value Ref Range Status   09/27/2020 49.4 37.0 - 54.0 fl Final     MPV   Date Value Ref Range Status   09/27/2020 8.0 6.0 - 12.0 fL Final     Platelets   Date Value Ref Range Status   09/27/2020 283 140 - 450 10*3/mm3 Final     Neutrophil %   Date Value Ref Range Status   09/27/2020 66.1 42.7 - 76.0 % Final     Lymphocyte %   Date Value Ref Range Status   09/27/2020 23.0 19.6 - 45.3 % Final     Monocyte %   Date Value Ref Range Status   09/27/2020 8.3 5.0 - 12.0 % Final     Eosinophil %   Date Value Ref Range Status   09/27/2020 2.1 0.3 - 6.2 % Final     Basophil %   Date Value Ref Range Status   09/27/2020 0.5 0.0 - 1.5 % Final     Neutrophils, Absolute   Date Value Ref Range Status   09/27/2020 4.00 1.70 - 7.00 10*3/mm3 Final     Lymphocytes, Absolute   Date Value Ref Range Status   09/27/2020 1.40 0.70 - 3.10 10*3/mm3 Final     Monocytes, Absolute   Date Value Ref Range Status   09/27/2020 0.50 0.10 - 0.90 10*3/mm3 Final     Eosinophils, Absolute   Date Value Ref Range Status   09/27/2020 0.10 0.00 - 0.40 10*3/mm3 Final     Basophils, Absolute   Date Value Ref Range Status   09/27/2020 0.00 0.00 - 0.20 10*3/mm3 Final     nRBC   Date Value Ref Range Status   09/27/2020 0.1 0.0 - 0.2 /100 WBC Final       Lab Results   Component Value Date    GLUCOSE 100 (H) 09/27/2020    BUN  09/27/2020      Comment:      Testing performed by alternate method    BUN 14 09/27/2020     CREATININE 0.65 09/27/2020    EGFRIFNONA 100 09/27/2020    BCR  09/27/2020      Comment:      Testing not performed    K 3.8 09/27/2020    CO2 29.0 09/27/2020    CALCIUM 8.9 09/27/2020    ALBUMIN 3.80 09/27/2020    AST 21 09/27/2020    ALT 28 09/27/2020         Assessment/Plan     There are no diagnoses linked to this encounter.    ASSESSMENT:    1. Microcytic anemia due to iron deficiency from menorrhagia, gastric sleeve surgery: Status post IV Injectafer 11/12/2020  2. Mild folate deficiency  3. Low normal B12 level but normal MMA  4. Scheduled for partial hysterectomy on 11/24/2020 by Dr. Vasquez: Declined by insurance company.  D&C scheduled first week January 2021  5. Status post colonoscopy August 2020  6. History of gastric sleeve surgery  7. History of hematuria: Seen by Dr. Ricks  8. Assessment has been reviewed and updated CBC today           Plans     · Reviewed her lab results with patient: Check CBC today  · Restart oral iron supplementation due to increased menstrual flow  · Urinalysis was negative for hematuria  · Follow up 6 weeks with CBC done a day prior  · All questions answered        Thank you very much for allowing us to participate in the care of Kyle, I will keep you updated on her progress        Patient verbalized understanding and is in agreement of the above plan.

## 2020-12-28 ENCOUNTER — TELEPHONE (OUTPATIENT)
Dept: ONCOLOGY | Facility: CLINIC | Age: 40
End: 2020-12-28

## 2020-12-28 NOTE — TELEPHONE ENCOUNTER
Caller: ZENAIDA    Relationship: [facility]    Best call back number: [phone]    What orders are you requesting (i.e. lab or imaging): LAB (CURRENT ORDER )    In what timeframe would the patient need to come in: PT IN OFFICE NOW    Where will you receive your lab/imaging services: [facility] # [phone]

## 2020-12-29 ENCOUNTER — LAB (OUTPATIENT)
Dept: LAB | Facility: HOSPITAL | Age: 40
End: 2020-12-29

## 2020-12-29 ENCOUNTER — HOSPITAL ENCOUNTER (OUTPATIENT)
Dept: CARDIOLOGY | Facility: HOSPITAL | Age: 40
Discharge: HOME | End: 2020-12-29

## 2020-12-29 DIAGNOSIS — Z01.818 PREOP TESTING: Primary | ICD-10-CM

## 2020-12-29 PROCEDURE — 81003 URINALYSIS AUTO W/O SCOPE: CPT

## 2020-12-29 PROCEDURE — 80048 BASIC METABOLIC PNL TOTAL CA: CPT

## 2020-12-29 PROCEDURE — 85027 COMPLETE CBC AUTOMATED: CPT

## 2020-12-29 PROCEDURE — 93010 ELECTROCARDIOGRAM REPORT: CPT | Performed by: INTERNAL MEDICINE

## 2020-12-29 PROCEDURE — 93005 ELECTROCARDIOGRAM TRACING: CPT | Performed by: OBSTETRICS & GYNECOLOGY

## 2020-12-30 LAB
ANION GAP SERPL CALCULATED.3IONS-SCNC: 8.1 MMOL/L (ref 5–15)
BILIRUB UR QL STRIP: NEGATIVE
BUN SERPL-MCNC: 17 MG/DL (ref 6–20)
BUN/CREAT SERPL: 23.3 (ref 7–25)
CALCIUM SPEC-SCNC: 9.1 MG/DL (ref 8.6–10.5)
CHLORIDE SERPL-SCNC: 102 MMOL/L (ref 98–107)
CLARITY UR: CLEAR
CO2 SERPL-SCNC: 30.9 MMOL/L (ref 22–29)
COLOR UR: YELLOW
CREAT SERPL-MCNC: 0.73 MG/DL (ref 0.57–1)
DEPRECATED RDW RBC AUTO: 52.3 FL (ref 37–54)
ERYTHROCYTE [DISTWIDTH] IN BLOOD BY AUTOMATED COUNT: 17.2 % (ref 12.3–15.4)
GFR SERPL CREATININE-BSD FRML MDRD: 88 ML/MIN/1.73
GLUCOSE SERPL-MCNC: 113 MG/DL (ref 65–99)
GLUCOSE UR STRIP-MCNC: NEGATIVE MG/DL
HCT VFR BLD AUTO: 40.4 % (ref 34–46.6)
HGB BLD-MCNC: 13.3 G/DL (ref 12–15.9)
HGB UR QL STRIP.AUTO: NEGATIVE
KETONES UR QL STRIP: NEGATIVE
LEUKOCYTE ESTERASE UR QL STRIP.AUTO: NEGATIVE
MCH RBC QN AUTO: 27.4 PG (ref 26.6–33)
MCHC RBC AUTO-ENTMCNC: 32.9 G/DL (ref 31.5–35.7)
MCV RBC AUTO: 83.1 FL (ref 79–97)
NITRITE UR QL STRIP: NEGATIVE
PH UR STRIP.AUTO: 6.5 [PH] (ref 5–8)
PLATELET # BLD AUTO: 271 10*3/MM3 (ref 140–450)
PMV BLD AUTO: 10.5 FL (ref 6–12)
POTASSIUM SERPL-SCNC: 3.9 MMOL/L (ref 3.5–5.2)
PROT UR QL STRIP: NEGATIVE
RBC # BLD AUTO: 4.86 10*6/MM3 (ref 3.77–5.28)
SODIUM SERPL-SCNC: 141 MMOL/L (ref 136–145)
SP GR UR STRIP: 1.02 (ref 1–1.03)
UROBILINOGEN UR QL STRIP: NORMAL
WBC # BLD AUTO: 6.49 10*3/MM3 (ref 3.4–10.8)

## 2021-01-04 ENCOUNTER — HOSPITAL ENCOUNTER (EMERGENCY)
Facility: HOSPITAL | Age: 41
Discharge: HOME | End: 2021-01-04
Admitting: EMERGENCY MEDICINE

## 2021-01-04 ENCOUNTER — TELEPHONE (OUTPATIENT)
Dept: ONCOLOGY | Facility: HOSPITAL | Age: 41
End: 2021-01-04

## 2021-01-04 VITALS
TEMPERATURE: 98.4 F | BODY MASS INDEX: 45.99 KG/M2 | SYSTOLIC BLOOD PRESSURE: 118 MMHG | RESPIRATION RATE: 18 BRPM | HEART RATE: 108 BPM | OXYGEN SATURATION: 96 % | WEIGHT: 293 LBS | HEIGHT: 67 IN | DIASTOLIC BLOOD PRESSURE: 75 MMHG

## 2021-01-04 DIAGNOSIS — U07.1 COVID-19 VIRUS INFECTION: Primary | ICD-10-CM

## 2021-01-04 LAB — SARS-COV-2 RNA PNL SPEC NAA+PROBE: DETECTED

## 2021-01-04 PROCEDURE — 87635 SARS-COV-2 COVID-19 AMP PRB: CPT | Performed by: NURSE PRACTITIONER

## 2021-01-04 PROCEDURE — C9803 HOPD COVID-19 SPEC COLLECT: HCPCS

## 2021-01-04 PROCEDURE — 99283 EMERGENCY DEPT VISIT LOW MDM: CPT

## 2021-01-04 NOTE — DISCHARGE INSTRUCTIONS
Symptomatic treatment such as Tylenol for pain and fever.  Mucinex for congestion.  Drink plenty of fluids.  Follow-up with your primary care provider.  Return for new or worsening symptoms.

## 2021-01-04 NOTE — ED PROVIDER NOTES
Subjective   Patient is a 40-year-old obese white female with history of hypertension, GERD, anxiety and depression.  She presents today with complaints of nonproductive cough, nasal congestion, loss of taste and smell.  She also complains of chills and fatigue.  She states her symptoms started last night.  She reports exposure to 2 people 3 days ago who recently tested positive for Covid.  Patient denies any fever.  She denies any chest pain shortness of breath, abdominal pain nausea vomiting diarrhea or other complaint.          Review of Systems   Constitutional: Positive for chills and fatigue. Negative for fever.   HENT: Positive for congestion.         Loss of taste and smell   Respiratory: Positive for cough. Negative for shortness of breath.    Cardiovascular: Negative for chest pain.   Gastrointestinal: Negative for abdominal pain, diarrhea, nausea and vomiting.   Genitourinary: Negative for dysuria.   Musculoskeletal: Negative for neck pain and neck stiffness.   Skin: Negative for rash.   Neurological: Negative for weakness.       Past Medical History:   Diagnosis Date   • Anxiety and depression    • Arthritis    • Breast injury    • Fibrocystic breast    • GERD (gastroesophageal reflux disease)    • Hypertension    • Morbid obesity (CMS/HCC)        Allergies   Allergen Reactions   • Antihistamines, Diphenhydramine-Type Other (See Comments)     Bloody nose       Past Surgical History:   Procedure Laterality Date   • ABDOMINAL SURGERY     • COLONOSCOPY N/A 9/1/2020    Procedure: COLONOSCOPY to cecum and TI with cold snare polypectomy;  Surgeon: Giovanni Lara MD;  Location: [facility] ENDOSCOPY;  Service: Gastroenterology;  Laterality: N/A;  pre-iron def anemia  post-polyp   • ENDOSCOPY N/A 8/15/2019    Procedure: ESOPHAGOGASTRODUODENOSCOPY W/BIOPSY;  Surgeon: Lyn Gan MD;  Location: [facility] ENDOSCOPY;  Service: General   • ENDOSCOPY N/A 9/1/2020    Procedure: ESOPHAGOGASTRODUODENOSCOPY;  Surgeon:  Giovanni Lara MD;  Location: [facility] ENDOSCOPY;  Service: Gastroenterology;  Laterality: N/A;  pre-iron def anemia  post-hiatal hernia   • GASTRIC BANDING REMOVAL N/A 10/17/2019    Procedure: GASTRIC BANDING REMOVAL LAPAROSCOPIC;  Surgeon: Lyn Gan MD;  Location: [facility] MAIN OR;  Service: General   • LAPAROSCOPIC GASTRIC BANDING     • LAPAROSCOPIC TUBAL LIGATION         Family History   Problem Relation Age of Onset   • Hypertension Mother    • Cancer Mother    • Skin cancer Mother    • Hypertension Father    • Heart attack Father    • Hypertension Sister    • Cancer Maternal Grandmother    • Lung cancer Maternal Grandmother    • Cancer Maternal Grandfather        Social History     Socioeconomic History   • Marital status: Single     Spouse name: Not on file   • Number of children: Not on file   • Years of education: Not on file   • Highest education level: Not on file   Tobacco Use   • Smoking status: Former Smoker     Quit date:      Years since quittin.0   • Smokeless tobacco: Never Used   Substance and Sexual Activity   • Alcohol use: Yes     Frequency: Never     Comment: 2 drinks per week   • Drug use: No   • Sexual activity: Defer           Objective   Physical Exam  Vital signs and triage nurse note reviewed.  Constitutional: Awake, alert; well-developed and well-nourished. No acute distress is noted.  Morbidly obese  HEENT: Normocephalic, atraumatic; pupils are PERRL with intact EOM; oropharynx is pink and moist without exudate or erythema.  No drooling or pooling of oral secretions.  Neck: Supple, full range of motion without pain; no cervical lymphadenopathy. Normal phonation.  Cardiovascular: Regular rate and rhythm, normal S1-S2.  No murmur noted.  Pulmonary: Respiratory effort regular nonlabored, breath sounds clear to auscultation all fields.  Abdomen: Soft, nontender, nondistended with normoactive bowel sounds; no rebound or guarding.  Musculoskeletal: Independent range of motion  of all extremities with no palpable tenderness or edema.  Neuro: Alert oriented x3, speech is clear and appropriate, GCS 15.    Skin: Flesh tone, warm, dry, intact; no erythematous or petechial rash or lesion.      Procedures           ED Course      Labs Reviewed   COVID-19,ABBOTT IN-HOUSE,NASAL SWAB (NO TRANSPORT MEDIA) 2 HR TAT - Abnormal; Notable for the following components:       Result Value    COVID19 Detected (*)     All other components within normal limits    Narrative:     Fact sheet for providers: https://www.fda.gov/media/498429/download     Fact sheet for patients: https://www.fda.gov/media/616399/download    Test performed by PCR.     No radiology results for the last day  Medications - No data to display                                       MDM  Number of Diagnoses or Management Options  COVID-19 virus infection:   Diagnosis management comments: Patient had Covid swab obtained was found be positive.  She is well-appearing, in no acute distress.  She has stable vital signs.  She is not hypoxic.  She is not hypotensive.  She is afebrile.  She denies any shortness of breath currently.    Diagnosis and treatment plan discussed with patient.  Patient agreeable to plan.   I discussed findings with patient who voices understanding of discharge instructions, signs and symptoms requiring return to ED; discharged improved and in stable condition with follow up for re-evaluation.         Amount and/or Complexity of Data Reviewed  Clinical lab tests: reviewed and ordered    Patient Progress  Patient progress: stable      Final diagnoses:   COVID-19 virus infection            Jaja Almaguer, CALE  01/04/21 9729

## 2021-01-04 NOTE — TELEPHONE ENCOUNTER
Returned pt's call.  She is wanting to know if she has an appointment today w/ Dr Gaviria.  She states that she is having symptoms of COVID and is going to be tested today.  Informed her that her appointment is not until 1/20.  She v/u.  Instructed her to call back if she is unable to keep this appointment after getting her COVID results.

## 2021-01-05 ENCOUNTER — PATIENT OUTREACH (OUTPATIENT)
Dept: CASE MANAGEMENT | Facility: OTHER | Age: 41
End: 2021-01-05

## 2021-01-05 ENCOUNTER — EPISODE CHANGES (OUTPATIENT)
Dept: CASE MANAGEMENT | Facility: OTHER | Age: 41
End: 2021-01-05

## 2021-01-05 LAB — QT INTERVAL: 385 MS

## 2021-01-05 NOTE — OUTREACH NOTE
Care Coordination Note    Called pt's PCP office, spoke with MD's MA. Notified MA that pt was seen at [facility] ED on 1/4/21, pt tested positive for covid 19, reports she will be calling their office for follow up. MA states she spoke with pt this afternoon, thanks RN-ACM for calling.     Subha Garner RN  Ambulatory     1/5/2021, 14:34 EST

## 2021-01-05 NOTE — OUTREACH NOTE
ED Potential Covid Discharge Follow-up    Pt discharged from [facility] ED on 1/4/21, seen for cough, congestion, loss of taste and smell, chills, fatigue, covid 19 testing performed, pt covid positive. RN-ACM outreach call made to pt. Pt reports she received positive covid results in the ED. Pt c/o dry cough, mark, loss of taste and smell, chills, fatigue, diarrhea, vomiting, mild SOA with activity. She denies CP or fever. Reviewed ED AVS with pt. Education provided. Reviewed quarantine instructions, symptom management, symptoms to monitor, when to seek medical attention. Pt denies food, medication, or transportation insecurities. Discussed PCP follow up, pt states she will call to schedule. No questions or concerns per pt. [facility] 24 hour nurse line and covid hotline numbers provided to pt. Advised pt to call with any needs. Follow up outreach as needed.     Subha Garner RN  Ambulatory     1/5/2021, 12:40 EST

## 2021-01-18 NOTE — PROGRESS NOTES
Hematology/Oncology Outpatient Follow Up    PATIENT NAME:Kyle Pennington  :1980  MRN: [id number]  PRIMARY CARE PHYSICIAN: Geeta Hathaway APRN  REFERRING PHYSICIAN: Geeta Hathaway APRN    Chief Complaint   Patient presents with   • Follow-up     Iron deficiency anemia        HISTORY OF PRESENT ILLNESS:     This is a 40-year-old female who has been referred secondary to anemia.  Patient has a history of having gastric sleeve surgery in .  At that time she received IV iron infusion.  Over the course of time she has become progressively anemic.  Over the past 6 months she has been on oral iron supplementation.     Review of her CBC from 2020, white count was 6, hemoglobin 11.5, MCV was 71 and platelets were 283.  Differentials were 60% neutrophils, 23% lymphocytes, there was no monocytosis, eosinophilia or basophilia.  Patient had a chemistry panel creatinine was 0.65 and liver function tests were normal.  Review of her CBC from 2020 white count was 6.8, hemoglobin 9.5 and platelets were 334.  2020 white count was 9.2, hemoglobin 10.3 and platelets with 391.  On 2020 patient had a EGD and colonoscopy done which showed hiatal hernia and transverse colonic polyp.  This was performed by Dr. Lara.     She also has a history of hematuria for which she was [facility]  She has had menorrhagia and is scheduled for partial hysterectomy on 2020 by Dr. Vasquez.     Patient has been referred due to anemia.  Complains of extreme tiredness    · 10/21/2020 patient had anemia labs done.  White count was 6.2, hemoglobin 11.8, MCV was low at 76 and platelets were 3 20,000.  Differential essentially unremarkable.  SPEP with [facility]TON did not show any evidence of monoclonal protein.  Serum ferritin was low at 12, B12 was low normal at 292 and folate was low normal at 6.  Reticulocyte count was normal at 1.4%.  · Patient is scheduled for partial hysterectomy to  be performed by Dr. Vasquez on 11/24/2020 but was denied by her insurance company.  She is now scheduled for D&C first week in January  · 11/4/2020 she had a methylmalonic acid level which was normal, urinalysis was negative  · 11/12/2020 patient had IV Injectafer and also on 11/19/2020      Past Medical History:   Diagnosis Date   • Anxiety and depression    • Arthritis    • Breast injury    • Fibrocystic breast    • GERD (gastroesophageal reflux disease)    • Hypertension    • Morbid obesity (CMS/HCC)        Past Surgical History:   Procedure Laterality Date   • ABDOMINAL SURGERY     • COLONOSCOPY N/A 9/1/2020    Procedure: COLONOSCOPY to cecum and TI with cold snare polypectomy;  Surgeon: Giovanni Lara MD;  Location: [facility] ENDOSCOPY;  Service: Gastroenterology;  Laterality: N/A;  pre-iron def anemia  post-polyp   • ENDOSCOPY N/A 8/15/2019    Procedure: ESOPHAGOGASTRODUODENOSCOPY W/BIOPSY;  Surgeon: Lyn Gan MD;  Location: [facility] ENDOSCOPY;  Service: General   • ENDOSCOPY N/A 9/1/2020    Procedure: ESOPHAGOGASTRODUODENOSCOPY;  Surgeon: Giovanni Lara MD;  Location: [facility] ENDOSCOPY;  Service: Gastroenterology;  Laterality: N/A;  pre-iron def anemia  post-hiatal hernia   • GASTRIC BANDING REMOVAL N/A 10/17/2019    Procedure: GASTRIC BANDING REMOVAL LAPAROSCOPIC;  Surgeon: Lyn aGn MD;  Location: [facility] MAIN OR;  Service: General   • LAPAROSCOPIC GASTRIC BANDING     • LAPAROSCOPIC TUBAL LIGATION           Current Outpatient Medications:   •  buPROPion XL (WELLBUTRIN XL) 150 MG 24 hr tablet, Take 150 mg by mouth Daily., Disp: , Rfl:   •  ferrous sulfate 325 (65 FE) MG tablet, Take 325 mg by mouth Daily With Breakfast., Disp: , Rfl:   •  folic acid (FOLVITE) 1 MG tablet, Take 1 tablet by mouth Daily., Disp: 30 tablet, Rfl: 2  •  ibuprofen (ADVIL,MOTRIN) 200 MG tablet, Take 800 mg by mouth Every 6 (Six) Hours As Needed for Mild Pain  (leg pain)., Disp: , Rfl:   •   lisinopril-hydrochlorothiazide (PRINZIDE,ZESTORETIC) 20-25 MG per tablet, Take 1 tablet by mouth Daily. for blood pressure., Disp: , Rfl: 2  •  Multiple Vitamins-Calcium (ONE-A-DAY WOMENS PO), Take 1 tablet by mouth Daily., Disp: , Rfl:   •  omeprazole (priLOSEC) 40 MG capsule, TAKE 1 CAPSULE BY MOUTH EVERY DAY, Disp: 30 capsule, Rfl: 6  •  ondansetron ODT (ZOFRAN-ODT) 4 MG disintegrating tablet, Place 4 mg on the tongue Every 8 (Eight) Hours As Needed for Nausea or Vomiting., Disp: , Rfl:     Allergies   Allergen Reactions   • Antihistamines, Diphenhydramine-Type Other (See Comments)     Bloody nose       Family History   Problem Relation Age of Onset   • Hypertension Mother    • Cancer Mother    • Skin cancer Mother    • Hypertension Father    • Heart attack Father    • Hypertension Sister    • Cancer Maternal Grandmother    • Lung cancer Maternal Grandmother    • Cancer Maternal Grandfather        Cancer-related family history includes Cancer in her maternal grandfather, maternal grandmother, and mother; Lung cancer in her maternal grandmother; Skin cancer in her mother.    Social History     Tobacco Use   • Smoking status: Former Smoker     Quit date: 2000     Years since quittin.0   • Smokeless tobacco: Never Used   Substance Use Topics   • Alcohol use: Yes     Frequency: Never     Comment: 2 drinks per week   • Drug use: No       HPI, ROS and PFSH have been reviewed and confirmed on 2021.     SUBJECTIVE:    She has no specific complaints today.  She was diagnosed with Covid 19 infection.  D&C has been postponed till 2021.  She remains on oral iron supplementation.        REVIEW OF SYSTEMS:  Review of Systems   Constitutional: Negative for chills and fever.   HENT: Negative for ear pain, mouth sores, nosebleeds and sore throat.    Eyes: Negative for photophobia and visual disturbance.   Respiratory: Negative for wheezing and stridor.    Cardiovascular: Negative for chest pain and  "palpitations.   Gastrointestinal: Negative for abdominal pain, diarrhea, nausea and vomiting.   Endocrine: Negative for cold intolerance and heat intolerance.   Genitourinary: Negative for dysuria and hematuria.   Musculoskeletal: Negative for joint swelling and neck stiffness.   Skin: Negative for color change and rash.   Neurological: Negative for seizures and syncope.   Hematological: Negative for adenopathy.        Increased menstrual flow   Psychiatric/Behavioral: Negative for agitation, confusion and hallucinations.     I have reviewed and confirmed the accuracy of the ROS as documented by the MA/LPN/RN Sisi Vane Gaviria MD    OBJECTIVE:    Vitals:    01/20/21 0923   BP: 137/100   Pulse: 84   Resp: 20   Temp: 97.1 °F (36.2 °C)   TempSrc: Temporal   Height: 168.9 cm (66.5\")   PainSc: 0-No pain     Body mass index is 60.85 kg/m².    ECOG  (1) Restricted in physically strenuous activity, ambulatory and able to do work of light nature    Physical Exam  Constitutional:       Appearance: Normal appearance. She is not toxic-appearing or diaphoretic.   HENT:      Head: Normocephalic and atraumatic.      Right Ear: External ear normal.      Left Ear: External ear normal.      Nose: Nose normal.      Mouth/Throat:      Mouth: Mucous membranes are moist.   Eyes:      Extraocular Movements: Extraocular movements intact.      Conjunctiva/sclera: Conjunctivae normal.      Pupils: Pupils are equal, round, and reactive to light.   Pulmonary:      Effort: Pulmonary effort is normal.   Musculoskeletal: Normal range of motion.   Neurological:      General: No focal deficit present.      Mental Status: She is alert and oriented to person, place, and time.   Psychiatric:         Mood and Affect: Mood normal.         Behavior: Behavior normal.         Thought Content: Thought content normal.         Judgment: Judgment normal.     I have reexamined the patient and the results are consistent with the previously documented exam. " Sisi Vane Gaviria MD     RECENT LABS    WBC   Date Value Ref Range Status   12/29/2020 6.49 3.40 - 10.80 10*3/mm3 Final     RBC   Date Value Ref Range Status   12/29/2020 4.86 3.77 - 5.28 10*6/mm3 Final     Hemoglobin   Date Value Ref Range Status   12/29/2020 13.3 12.0 - 15.9 g/dL Final     Hematocrit   Date Value Ref Range Status   12/29/2020 40.4 34.0 - 46.6 % Final     MCV   Date Value Ref Range Status   12/29/2020 83.1 79.0 - 97.0 fL Final     MCH   Date Value Ref Range Status   12/29/2020 27.4 26.6 - 33.0 pg Final     MCHC   Date Value Ref Range Status   12/29/2020 32.9 31.5 - 35.7 g/dL Final     RDW   Date Value Ref Range Status   12/29/2020 17.2 (H) 12.3 - 15.4 % Final     RDW-SD   Date Value Ref Range Status   12/29/2020 52.3 37.0 - 54.0 fl Final     MPV   Date Value Ref Range Status   12/29/2020 10.5 6.0 - 12.0 fL Final     Platelets   Date Value Ref Range Status   12/29/2020 271 140 - 450 10*3/mm3 Final     Neutrophil %   Date Value Ref Range Status   09/27/2020 66.1 42.7 - 76.0 % Final     Lymphocyte %   Date Value Ref Range Status   09/27/2020 23.0 19.6 - 45.3 % Final     Monocyte %   Date Value Ref Range Status   09/27/2020 8.3 5.0 - 12.0 % Final     Eosinophil %   Date Value Ref Range Status   09/27/2020 2.1 0.3 - 6.2 % Final     Basophil %   Date Value Ref Range Status   09/27/2020 0.5 0.0 - 1.5 % Final     Neutrophils, Absolute   Date Value Ref Range Status   09/27/2020 4.00 1.70 - 7.00 10*3/mm3 Final     Lymphocytes, Absolute   Date Value Ref Range Status   09/27/2020 1.40 0.70 - 3.10 10*3/mm3 Final     Monocytes, Absolute   Date Value Ref Range Status   09/27/2020 0.50 0.10 - 0.90 10*3/mm3 Final     Eosinophils, Absolute   Date Value Ref Range Status   09/27/2020 0.10 0.00 - 0.40 10*3/mm3 Final     Basophils, Absolute   Date Value Ref Range Status   09/27/2020 0.00 0.00 - 0.20 10*3/mm3 Final     nRBC   Date Value Ref Range Status   09/27/2020 0.1 0.0 - 0.2 /100 WBC Final       Lab Results    Component Value Date    GLUCOSE 113 (H) 12/29/2020    BUN 17 12/29/2020    CREATININE 0.73 12/29/2020    EGFRIFNONA 88 12/29/2020    BCR 23.3 12/29/2020    K 3.9 12/29/2020    CO2 30.9 (H) 12/29/2020    CALCIUM 9.1 12/29/2020    ALBUMIN 3.80 09/27/2020    AST 21 09/27/2020    ALT 28 09/27/2020         Assessment/Plan     Iron deficiency anemia, unspecified iron deficiency anemia type  - CBC & Differential  - Ferritin      ASSESSMENT:    1. Microcytic anemia due to iron deficiency from menorrhagia, gastric sleeve surgery: Status post IV Injectafer 11/12/2020  2. Mild folate deficiency  3. Low normal B12 level but normal MMA  4. Scheduled for partial hysterectomy on 11/24/2020 by Dr. Vasquez: Declined by insurance company.  D&C scheduled February 2021  5. Recent COVID-19 infection  6. Status post colonoscopy August 2020  7. History of gastric sleeve surgery  8. History of hematuria: Seen by Dr. Ricks  9. Assessment has been reviewed and updated 1/20/2021           Plans     · Continue iron supplementation  · Her CBC remains within acceptable limits  · Follow-up in 3 months with CBC and serum ferritin level drawn a few days before the appointment  · Urinalysis was negative for hematuria  · Call earlier as needed for problems  · All questions answered        Thank you very much for allowing us to participate in the care of Kyle, I will keep you updated on her progress        Patient verbalized understanding and is in agreement of the above plan.

## 2021-01-20 ENCOUNTER — OFFICE VISIT (OUTPATIENT)
Dept: ONCOLOGY | Facility: CLINIC | Age: 41
End: 2021-01-20

## 2021-01-20 VITALS
TEMPERATURE: 97.1 F | DIASTOLIC BLOOD PRESSURE: 100 MMHG | HEIGHT: 67 IN | RESPIRATION RATE: 20 BRPM | HEART RATE: 84 BPM | SYSTOLIC BLOOD PRESSURE: 137 MMHG | BODY MASS INDEX: 60.85 KG/M2

## 2021-01-20 DIAGNOSIS — D50.9 IRON DEFICIENCY ANEMIA, UNSPECIFIED IRON DEFICIENCY ANEMIA TYPE: Primary | ICD-10-CM

## 2021-01-20 PROCEDURE — 99213 OFFICE O/P EST LOW 20 MIN: CPT | Performed by: INTERNAL MEDICINE

## 2021-01-20 RX ORDER — FERROUS SULFATE 325(65) MG
325 TABLET ORAL
COMMUNITY
End: 2021-04-21 | Stop reason: SDUPTHER

## 2021-02-05 ENCOUNTER — TELEPHONE (OUTPATIENT)
Dept: ONCOLOGY | Facility: CLINIC | Age: 41
End: 2021-02-05

## 2021-02-05 NOTE — TELEPHONE ENCOUNTER
Called pt to let her know that she no longer needs to take her folic acid. Pt verbalized understanding.

## 2021-02-10 RX ORDER — NAPROXEN SODIUM 220 MG
220 TABLET ORAL 2 TIMES DAILY PRN
COMMUNITY
End: 2021-10-29

## 2021-02-12 ENCOUNTER — HOSPITAL ENCOUNTER (OUTPATIENT)
Dept: CARDIOLOGY | Facility: HOSPITAL | Age: 41
Discharge: HOME | End: 2021-02-12

## 2021-02-12 ENCOUNTER — LAB (OUTPATIENT)
Dept: LAB | Facility: HOSPITAL | Age: 41
End: 2021-02-12

## 2021-02-12 LAB
ANION GAP SERPL CALCULATED.3IONS-SCNC: 6.6 MMOL/L (ref 5–15)
BASOPHILS # BLD AUTO: 0.03 10*3/MM3 (ref 0–0.2)
BASOPHILS NFR BLD AUTO: 0.5 % (ref 0–1.5)
BUN SERPL-MCNC: 16 MG/DL (ref 6–20)
BUN/CREAT SERPL: 24.6 (ref 7–25)
CALCIUM SPEC-SCNC: 9.6 MG/DL (ref 8.6–10.5)
CHLORIDE SERPL-SCNC: 101 MMOL/L (ref 98–107)
CO2 SERPL-SCNC: 32.4 MMOL/L (ref 22–29)
CREAT SERPL-MCNC: 0.65 MG/DL (ref 0.57–1)
DEPRECATED RDW RBC AUTO: 43 FL (ref 37–54)
EOSINOPHIL # BLD AUTO: 0.13 10*3/MM3 (ref 0–0.4)
EOSINOPHIL NFR BLD AUTO: 2 % (ref 0.3–6.2)
ERYTHROCYTE [DISTWIDTH] IN BLOOD BY AUTOMATED COUNT: 14 % (ref 12.3–15.4)
GFR SERPL CREATININE-BSD FRML MDRD: 100 ML/MIN/1.73
GLUCOSE SERPL-MCNC: 118 MG/DL (ref 65–99)
HCT VFR BLD AUTO: 41.1 % (ref 34–46.6)
HGB BLD-MCNC: 13.3 G/DL (ref 12–15.9)
IMM GRANULOCYTES # BLD AUTO: 0.03 10*3/MM3 (ref 0–0.05)
IMM GRANULOCYTES NFR BLD AUTO: 0.5 % (ref 0–0.5)
LYMPHOCYTES # BLD AUTO: 1.28 10*3/MM3 (ref 0.7–3.1)
LYMPHOCYTES NFR BLD AUTO: 19.5 % (ref 19.6–45.3)
MCH RBC QN AUTO: 27.4 PG (ref 26.6–33)
MCHC RBC AUTO-ENTMCNC: 32.4 G/DL (ref 31.5–35.7)
MCV RBC AUTO: 84.6 FL (ref 79–97)
MONOCYTES # BLD AUTO: 0.51 10*3/MM3 (ref 0.1–0.9)
MONOCYTES NFR BLD AUTO: 7.8 % (ref 5–12)
NEUTROPHILS NFR BLD AUTO: 4.59 10*3/MM3 (ref 1.7–7)
NEUTROPHILS NFR BLD AUTO: 69.7 % (ref 42.7–76)
NRBC BLD AUTO-RTO: 0 /100 WBC (ref 0–0.2)
PLATELET # BLD AUTO: 252 10*3/MM3 (ref 140–450)
PMV BLD AUTO: 10.7 FL (ref 6–12)
POTASSIUM SERPL-SCNC: 4.6 MMOL/L (ref 3.5–5.2)
RBC # BLD AUTO: 4.86 10*6/MM3 (ref 3.77–5.28)
SODIUM SERPL-SCNC: 140 MMOL/L (ref 136–145)
WBC # BLD AUTO: 6.57 10*3/MM3 (ref 3.4–10.8)

## 2021-02-12 PROCEDURE — 80048 BASIC METABOLIC PNL TOTAL CA: CPT

## 2021-02-12 PROCEDURE — 93010 ELECTROCARDIOGRAM REPORT: CPT | Performed by: INTERNAL MEDICINE

## 2021-02-12 PROCEDURE — 85025 COMPLETE CBC W/AUTO DIFF WBC: CPT

## 2021-02-12 PROCEDURE — 93005 ELECTROCARDIOGRAM TRACING: CPT | Performed by: OBSTETRICS & GYNECOLOGY

## 2021-02-13 ENCOUNTER — LAB (OUTPATIENT)
Dept: LAB | Facility: HOSPITAL | Age: 41
End: 2021-02-13

## 2021-02-13 LAB
BACTERIA UR QL AUTO: ABNORMAL /HPF
BILIRUB UR QL STRIP: NEGATIVE
CLARITY UR: CLEAR
COLOR UR: YELLOW
GLUCOSE UR STRIP-MCNC: NEGATIVE MG/DL
HGB UR QL STRIP.AUTO: NEGATIVE
HYALINE CASTS UR QL AUTO: ABNORMAL /LPF
KETONES UR QL STRIP: NEGATIVE
LEUKOCYTE ESTERASE UR QL STRIP.AUTO: ABNORMAL
NITRITE UR QL STRIP: NEGATIVE
PH UR STRIP.AUTO: 7 [PH] (ref 5–8)
PROT UR QL STRIP: NEGATIVE
QT INTERVAL: 353 MS
RBC # UR: ABNORMAL /HPF
REF LAB TEST METHOD: ABNORMAL
SP GR UR STRIP: 1.02 (ref 1–1.03)
SQUAMOUS #/AREA URNS HPF: ABNORMAL /HPF
UROBILINOGEN UR QL STRIP: ABNORMAL
WBC UR QL AUTO: ABNORMAL /HPF

## 2021-02-13 PROCEDURE — 87086 URINE CULTURE/COLONY COUNT: CPT | Performed by: OBSTETRICS & GYNECOLOGY

## 2021-02-13 PROCEDURE — 81001 URINALYSIS AUTO W/SCOPE: CPT | Performed by: OBSTETRICS & GYNECOLOGY

## 2021-02-15 LAB — BACTERIA SPEC AEROBE CULT: NO GROWTH

## 2021-02-18 ENCOUNTER — HOSPITAL ENCOUNTER (OUTPATIENT)
Facility: HOSPITAL | Age: 41
Setting detail: HOSPITAL OUTPATIENT SURGERY
Discharge: HOME | End: 2021-02-18
Attending: OBSTETRICS & GYNECOLOGY | Admitting: OBSTETRICS & GYNECOLOGY

## 2021-02-18 ENCOUNTER — ANESTHESIA EVENT (OUTPATIENT)
Dept: PERIOP | Facility: HOSPITAL | Age: 41
End: 2021-02-18

## 2021-02-18 ENCOUNTER — ANESTHESIA (OUTPATIENT)
Dept: PERIOP | Facility: HOSPITAL | Age: 41
End: 2021-02-18

## 2021-02-18 ENCOUNTER — APPOINTMENT (OUTPATIENT)
Dept: GENERAL RADIOLOGY | Facility: HOSPITAL | Age: 41
End: 2021-02-18

## 2021-02-18 VITALS
SYSTOLIC BLOOD PRESSURE: 129 MMHG | HEIGHT: 67 IN | TEMPERATURE: 98.1 F | DIASTOLIC BLOOD PRESSURE: 92 MMHG | WEIGHT: 293 LBS | BODY MASS INDEX: 45.99 KG/M2 | OXYGEN SATURATION: 95 % | HEART RATE: 90 BPM | RESPIRATION RATE: 18 BRPM

## 2021-02-18 DIAGNOSIS — N93.8 DYSFUNCTIONAL UTERINE BLEEDING: ICD-10-CM

## 2021-02-18 LAB — B-HCG UR QL: NEGATIVE

## 2021-02-18 PROCEDURE — 25010000002 DEXAMETHASONE PER 1 MG: Performed by: ANESTHESIOLOGY

## 2021-02-18 PROCEDURE — 25010000002 MIDAZOLAM PER 1 MG: Performed by: ANESTHESIOLOGY

## 2021-02-18 PROCEDURE — 25010000002 ONDANSETRON PER 1 MG: Performed by: ANESTHESIOLOGY

## 2021-02-18 PROCEDURE — 71045 X-RAY EXAM CHEST 1 VIEW: CPT

## 2021-02-18 PROCEDURE — 25010000002 FENTANYL CITRATE (PF) 250 MCG/5ML SOLUTION: Performed by: ANESTHESIOLOGY

## 2021-02-18 PROCEDURE — 88305 TISSUE EXAM BY PATHOLOGIST: CPT | Performed by: OBSTETRICS & GYNECOLOGY

## 2021-02-18 PROCEDURE — 25010000002 PROPOFOL 200 MG/20ML EMULSION: Performed by: ANESTHESIOLOGY

## 2021-02-18 PROCEDURE — 81025 URINE PREGNANCY TEST: CPT | Performed by: OBSTETRICS & GYNECOLOGY

## 2021-02-18 PROCEDURE — 25010000002 SUCCINYLCHOLINE PER 20 MG: Performed by: ANESTHESIOLOGY

## 2021-02-18 PROCEDURE — 25010000002 KETOROLAC TROMETHAMINE PER 15 MG: Performed by: ANESTHESIOLOGY

## 2021-02-18 RX ORDER — SUCCINYLCHOLINE CHLORIDE 20 MG/ML
INJECTION INTRAMUSCULAR; INTRAVENOUS AS NEEDED
Status: DISCONTINUED | OUTPATIENT
Start: 2021-02-18 | End: 2021-02-18 | Stop reason: SURG

## 2021-02-18 RX ORDER — ONDANSETRON 2 MG/ML
4 INJECTION INTRAMUSCULAR; INTRAVENOUS ONCE AS NEEDED
Status: DISCONTINUED | OUTPATIENT
Start: 2021-02-18 | End: 2021-02-18 | Stop reason: HOSPADM

## 2021-02-18 RX ORDER — ONDANSETRON 2 MG/ML
INJECTION INTRAMUSCULAR; INTRAVENOUS AS NEEDED
Status: DISCONTINUED | OUTPATIENT
Start: 2021-02-18 | End: 2021-02-18 | Stop reason: SURG

## 2021-02-18 RX ORDER — ROCURONIUM BROMIDE 10 MG/ML
INJECTION, SOLUTION INTRAVENOUS AS NEEDED
Status: DISCONTINUED | OUTPATIENT
Start: 2021-02-18 | End: 2021-02-18 | Stop reason: SURG

## 2021-02-18 RX ORDER — MIDAZOLAM HYDROCHLORIDE 1 MG/ML
INJECTION INTRAMUSCULAR; INTRAVENOUS AS NEEDED
Status: DISCONTINUED | OUTPATIENT
Start: 2021-02-18 | End: 2021-02-18 | Stop reason: SURG

## 2021-02-18 RX ORDER — HYDROCODONE BITARTRATE AND ACETAMINOPHEN 5; 325 MG/1; MG/1
1 TABLET ORAL ONCE AS NEEDED
Status: COMPLETED | OUTPATIENT
Start: 2021-02-18 | End: 2021-02-18

## 2021-02-18 RX ORDER — DEXAMETHASONE SODIUM PHOSPHATE 4 MG/ML
INJECTION, SOLUTION INTRA-ARTICULAR; INTRALESIONAL; INTRAMUSCULAR; INTRAVENOUS; SOFT TISSUE AS NEEDED
Status: DISCONTINUED | OUTPATIENT
Start: 2021-02-18 | End: 2021-02-18 | Stop reason: SURG

## 2021-02-18 RX ORDER — PROPOFOL 10 MG/ML
INJECTION, EMULSION INTRAVENOUS AS NEEDED
Status: DISCONTINUED | OUTPATIENT
Start: 2021-02-18 | End: 2021-02-18 | Stop reason: SURG

## 2021-02-18 RX ORDER — SODIUM CHLORIDE, SODIUM LACTATE, POTASSIUM CHLORIDE, AND CALCIUM CHLORIDE .6; .31; .03; .02 G/100ML; G/100ML; G/100ML; G/100ML
20 INJECTION, SOLUTION INTRAVENOUS CONTINUOUS
Status: DISCONTINUED | OUTPATIENT
Start: 2021-02-18 | End: 2021-02-18 | Stop reason: HOSPADM

## 2021-02-18 RX ORDER — FENTANYL CITRATE 50 UG/ML
INJECTION, SOLUTION INTRAMUSCULAR; INTRAVENOUS AS NEEDED
Status: DISCONTINUED | OUTPATIENT
Start: 2021-02-18 | End: 2021-02-18 | Stop reason: SURG

## 2021-02-18 RX ORDER — KETOROLAC TROMETHAMINE 30 MG/ML
30 INJECTION, SOLUTION INTRAMUSCULAR; INTRAVENOUS EVERY 6 HOURS PRN
Status: COMPLETED | OUTPATIENT
Start: 2021-02-18 | End: 2021-02-18

## 2021-02-18 RX ADMIN — ROCURONIUM BROMIDE 36 MG: 10 INJECTION, SOLUTION INTRAVENOUS at 14:01

## 2021-02-18 RX ADMIN — PROPOFOL 50 MG: 10 INJECTION, EMULSION INTRAVENOUS at 14:02

## 2021-02-18 RX ADMIN — PROPOFOL 250 MG: 10 INJECTION, EMULSION INTRAVENOUS at 13:55

## 2021-02-18 RX ADMIN — SUGAMMADEX 100 MG: 100 INJECTION, SOLUTION INTRAVENOUS at 14:45

## 2021-02-18 RX ADMIN — PROPOFOL 50 MG: 10 INJECTION, EMULSION INTRAVENOUS at 14:00

## 2021-02-18 RX ADMIN — MIDAZOLAM 1 MG: 1 INJECTION INTRAMUSCULAR; INTRAVENOUS at 14:00

## 2021-02-18 RX ADMIN — FENTANYL CITRATE 50 MCG: 50 INJECTION, SOLUTION INTRAMUSCULAR; INTRAVENOUS at 13:50

## 2021-02-18 RX ADMIN — ROCURONIUM BROMIDE 4 MG: 10 INJECTION, SOLUTION INTRAVENOUS at 13:55

## 2021-02-18 RX ADMIN — MIDAZOLAM 1 MG: 1 INJECTION INTRAMUSCULAR; INTRAVENOUS at 13:49

## 2021-02-18 RX ADMIN — HYDROCODONE BITARTRATE AND ACETAMINOPHEN 1 TABLET: 5; 325 TABLET ORAL at 15:36

## 2021-02-18 RX ADMIN — SODIUM CHLORIDE, POTASSIUM CHLORIDE, SODIUM LACTATE AND CALCIUM CHLORIDE 800 ML: 600; 310; 30; 20 INJECTION, SOLUTION INTRAVENOUS at 14:43

## 2021-02-18 RX ADMIN — DEXAMETHASONE SODIUM PHOSPHATE 4 MG: 4 INJECTION, SOLUTION INTRAMUSCULAR; INTRAVENOUS at 14:22

## 2021-02-18 RX ADMIN — KETOROLAC TROMETHAMINE 30 MG: 30 INJECTION, SOLUTION INTRAMUSCULAR at 15:38

## 2021-02-18 RX ADMIN — SODIUM CHLORIDE, POTASSIUM CHLORIDE, SODIUM LACTATE AND CALCIUM CHLORIDE 20 ML/HR: 600; 310; 30; 20 INJECTION, SOLUTION INTRAVENOUS at 11:56

## 2021-02-18 RX ADMIN — SUGAMMADEX 300 MG: 100 INJECTION, SOLUTION INTRAVENOUS at 14:33

## 2021-02-18 RX ADMIN — ONDANSETRON 4 MG: 2 INJECTION INTRAMUSCULAR; INTRAVENOUS at 14:22

## 2021-02-18 RX ADMIN — SUCCINYLCHOLINE CHLORIDE 160 MG: 20 INJECTION, SOLUTION INTRAMUSCULAR; INTRAVENOUS at 13:56

## 2021-02-18 RX ADMIN — PROPOFOL 50 MG: 10 INJECTION, EMULSION INTRAVENOUS at 14:01

## 2021-02-18 NOTE — ANESTHESIA PREPROCEDURE EVALUATION
Anesthesia Evaluation     Patient summary reviewed and Nursing notes reviewed   NPO Solid Status: > 8 hours  NPO Liquid Status: > 8 hours           Airway   Mallampati: II  TM distance: >3 FB  Possible difficult intubation and Large neck circumference  Dental - normal exam     Pulmonary    Cardiovascular     (+) hypertension,       Neuro/Psych  (+) psychiatric history Bipolar,     GI/Hepatic/Renal/Endo    (+) obesity, morbid obesity, GERD,      Musculoskeletal     Abdominal    Substance History      OB/GYN    (+) Pregnant,         Other   arthritis,                      Anesthesia Plan    ASA 4     general       Anesthetic plan, all risks, benefits, and alternatives have been provided, discussed and informed consent has been obtained with: patient.

## 2021-02-18 NOTE — ANESTHESIA POSTPROCEDURE EVALUATION
Patient: Kyle Pennington    Procedure Summary     Date: 02/18/21 Room / Location: [facility] OR 06 / [facility] MAIN OR    Anesthesia Start: 1345 Anesthesia Stop: 1443    Procedure: DILATATION AND CURETTAGE HYSTEROSCOPY (N/A Uterus) Diagnosis:       Dysfunctional uterine bleeding      (Dysfunctional uterine bleeding [N93.8])    Surgeon: Power Vasquez MD Provider: Norah Ying MD    Anesthesia Type: general ASA Status: 4          Anesthesia Type: general    Vitals  Vitals Value Taken Time   /94 02/18/21 1551   Temp 97.7 °F (36.5 °C) 02/18/21 1443   Pulse 90 02/18/21 1551   Resp 23 02/18/21 1543   SpO2 93 % 02/18/21 1551   Vitals shown include unvalidated device data.        Post Anesthesia Care and Evaluation    Patient location during evaluation: PACU  Patient participation: complete - patient participated  Level of consciousness: awake  Pain scale: See nurse's notes for pain score.  Pain management: adequate  Airway patency: patent  Anesthetic complications: No anesthetic complications  PONV Status: none  Cardiovascular status: acceptable  Respiratory status: acceptable  Hydration status: acceptable    Comments: Patient seen and examined postoperatively; vital signs stable; SpO2 greater than or equal to 90%; cardiopulmonary status stable; nausea/vomiting adequately controlled; pain adequately controlled; no apparent anesthesia complications; patient discharged from anesthesia care when discharge criteria were met.  Because ETT had a small amount of frothy secretion (clear, not pink), and in light of intraoperative difficulty with high peak airway pressures (though stable,not increasing), CXR was ordered and results were pending about an hour after arrival to PACU.  Pt's back pain had been successfully treated, and breathing appeared to be baseline.

## 2021-02-18 NOTE — ANESTHESIA PROCEDURE NOTES
Airway  Date/Time: 2/18/2021 1:57 PM  End Time:2/18/2021 1:58 PM  Airway not difficult    General Information and Staff    Anesthesiologist: Norah Ying MD    Indications and Patient Condition  Indications for airway management: airway protection    Preoxygenated: yes  Mask difficulty assessment: 3 - difficult mask (inadequate, unstable or two providers) +/- NMBA    Final Airway Details  Final airway type: endotracheal airway      Successful airway: ETT  Cuffed: yes   Successful intubation technique: direct laryngoscopy  Facilitating devices/methods: intubating stylet  Endotracheal tube insertion site: oral  Blade: Carmen  Blade size: 3  ETT size (mm): 7.0  Cormack-Lehane Classification: grade I - full view of glottis  Placement verified by: chest auscultation and capnometry   Measured from: lips  ETT/EBT  to lips (cm): 21  Number of attempts at approach: 1  Assessment: lips, teeth, and gum same as pre-op and atraumatic intubation

## 2021-02-18 NOTE — OP NOTE
Subjective     Date of Service:  2/18/2021    Surgical Staff: Surgeon(s) and Role:     * Power Vasquez MD - Primary   Additional Staff:     Pre-operative diagnosis(es): Pre-Op Diagnosis Codes:     * Dysfunctional uterine bleeding [N93.8]     Post-operative diagnosis(es): Post-Op Diagnosis Codes:     * Dysfunctional uterine bleeding [N93.8]   Procedure(s): Procedure(s):  DILATATION AND CURETTAGE HYSTEROSCOPY            Anesthesia: Type: General  ASA:  IV     Objective      Operative findings:  The uterus sounded to 10 cm.  An adequate view of the endometrial cavity due to active bleeding.  No obvious structural anomaly.  The uterus was well supported and nonmobile   Specimens removed:  Endometrial curettings   Fluid Intake: Per Anesthesia   Output: Documented Output  Est. Blood Loss 0mL    No intake/output data recorded.     Blood products used: No   Drains: * No LDAs found *   Implant Information: Nothing was implanted during the procedure   Complications: 0   Description of Procedure:  Taken to the operating room and placed under general endotracheal anesthesia.  There was difficulty with ventilation during the procedure due to high ventilation pressures and low tidal volume.    She was stabilized so that the D&C could be performed.  The patient was prepped and draped in low lithotomy position in Renato stirrups    The cervix was grasped with a single-tooth tenaculum.  The cervix was dilated to allow.diagnostic hysteroscopy.  There was suboptimal visualization of the endometrial cavity.  Curetting was then performed and a moderate amount of tissue was obtained    Post curetting hysteroscopy was performed and again no obvious anomalies were encountered    The procedure was concluded and she was awakened and taken to recovery room extubated and in stable condition   Intraoperative consult(s):    Condition: stable   Disposition: to PACU and then admit to  medical - surgical floor         Assessment/Plan      Home from recovery            Name was responsible for performing the following activities: Retraction and their skilled assistance was necessary for the success of this case.      Power Vasquez MD  02/18/21  14:31 EST

## 2021-02-18 NOTE — H&P
Patient Care Team:  Geeta Hathaway APRN as PCP - General (Nurse Practitioner)    Chief complaint menorrhagia    Subjective     Patient is a 41 y.o. female presents with menorrhagia.  Schedule for D&C with hysteroscopy for evaluation.     Review of Systems   Pertinent items are noted in HPI    History  Past Medical History:   Diagnosis Date   • Anxiety and depression    • Arthritis    • Bipolar affective (CMS/HCC)    • Breast injury    • Edema leg    • Fibrocystic breast    • GERD (gastroesophageal reflux disease)    • Hypertension    • Morbid obesity (CMS/HCC)      Past Surgical History:   Procedure Laterality Date   • ABDOMINAL SURGERY     •  SECTION     • COLONOSCOPY N/A 2020    Procedure: COLONOSCOPY to cecum and TI with cold snare polypectomy;  Surgeon: Giovanni Lara MD;  Location: [facility] ENDOSCOPY;  Service: Gastroenterology;  Laterality: N/A;  pre-iron def anemia  post-polyp   • ENDOSCOPY N/A 8/15/2019    Procedure: ESOPHAGOGASTRODUODENOSCOPY W/BIOPSY;  Surgeon: Lyn Gan MD;  Location: [facility] ENDOSCOPY;  Service: General   • ENDOSCOPY N/A 2020    Procedure: ESOPHAGOGASTRODUODENOSCOPY;  Surgeon: Giovanni Lara MD;  Location: [facility] ENDOSCOPY;  Service: Gastroenterology;  Laterality: N/A;  pre-iron def anemia  post-hiatal hernia   • GASTRIC BANDING REMOVAL N/A 10/17/2019    Procedure: GASTRIC BANDING REMOVAL LAPAROSCOPIC;  Surgeon: Lyn Gan MD;  Location: [facility] MAIN OR;  Service: General   • LAPAROSCOPIC GASTRIC BANDING     • LAPAROSCOPIC TUBAL LIGATION       Family History   Problem Relation Age of Onset   • Hypertension Mother    • Cancer Mother    • Skin cancer Mother    • Hypertension Father    • Heart attack Father    • Hypertension Sister    • Cancer Maternal Grandmother    • Lung cancer Maternal Grandmother    • Cancer Maternal Grandfather      Social History     Tobacco Use   • Smoking status: Former Smoker     Quit date: 2000     Years since quitting:  "21.1   • Smokeless tobacco: Never Used   Substance Use Topics   • Alcohol use: Yes     Frequency: Never     Comment: 2 drinks per week   • Drug use: No       Allergies  Allergies   Allergen Reactions   • Antihistamines, Diphenhydramine-Type Other (See Comments)     Bloody nose       Medications  Medications Prior to Admission   Medication Sig Dispense Refill Last Dose   • buPROPion XL (WELLBUTRIN XL) 150 MG 24 hr tablet Take 150 mg by mouth Daily. Take dos   Past Week at Unknown time   • ferrous sulfate 325 (65 FE) MG tablet Take 325 mg by mouth Daily With Breakfast. Hold last dose 2/17   Past Week at Unknown time   • folic acid (FOLVITE) 1 MG tablet Take 1 tablet by mouth Daily. 30 tablet 2 Past Week at Unknown time   • ibuprofen (ADVIL,MOTRIN) 200 MG tablet Take 800 mg by mouth Every 6 (Six) Hours As Needed for Mild Pain  (leg pain).      • lisinopril-hydrochlorothiazide (PRINZIDE,ZESTORETIC) 20-25 MG per tablet Take 1 tablet by mouth Daily. for blood pressure.last dos 2/17  2 Past Week at Unknown time   • Multiple Vitamins-Calcium (ONE-A-DAY WOMENS PO) Take 1 tablet by mouth Daily. Last dose 2/13   Past Week at Unknown time   • naproxen sodium (ALEVE) 220 MG tablet Take 220 mg by mouth 2 (Two) Times a Day As Needed. Stop last dose 2/13      • omeprazole (priLOSEC) 40 MG capsule TAKE 1 CAPSULE BY MOUTH EVERY DAY (Patient taking differently: Take 40 mg by mouth Daily. Take dos) 30 capsule 6 2/18/2021 at Unknown time   • ondansetron ODT (ZOFRAN-ODT) 4 MG disintegrating tablet Place 4 mg on the tongue Every 8 (Eight) Hours As Needed for Nausea or Vomiting.          Objective     Vital Signs  Vitals:    02/10/21 1151 02/18/21 1138   BP:  167/76   Pulse:  79   Resp:  16   Temp:  98.2 °F (36.8 °C)   SpO2:  97%   Weight: (!) 172 kg (380 lb) (!) 182 kg (401 lb 3.8 oz)   Height: 170.2 cm (67\") 170.2 cm (67\")       Physical Exam:      General Appearance:    Alert, cooperative, in no acute distress   Lungs:     Clear to " auscultation,respirations regular.    Heart:    Regular rhythm and normal rate.   Breast Exam:    Deferred   Abdomen:     Normal bowel sounds, no masses, soft nontender, nondistended, no guarding, no rebound tenderness   Genitalia:    Deferred   Extremities:   Moves all extremities well, no edema, no cyanosis, no           redness   Pulses:   Pulses palpable and equal bilaterally       Results Review:      Lab Results (last 48 hours)     Procedure Component Value Units Date/Time    Pregnancy, Urine - Urine, Clean Catch [[id number]]  (Normal) Collected: 02/18/21 1116    Specimen: Urine, Clean Catch Updated: 02/18/21 1133     HCG, Urine QL Negative           Imaging Results (Last 48 Hours)     ** No results found for the last 48 hours. **          Assessment/Plan       * No active hospital problems. *      Menorrhagia.  Plan    D&C with hysteroscopy      Power Vasquez MD  02/18/21  12:54 EST

## 2021-02-19 LAB
LAB AP CASE REPORT: NORMAL
LAB AP DIAGNOSIS COMMENT: NORMAL
PATH REPORT.FINAL DX SPEC: NORMAL
PATH REPORT.GROSS SPEC: NORMAL

## 2021-02-26 ENCOUNTER — TELEPHONE (OUTPATIENT)
Dept: CARDIOLOGY | Facility: CLINIC | Age: 41
End: 2021-02-26

## 2021-02-26 ENCOUNTER — OFFICE VISIT (OUTPATIENT)
Dept: CARDIOLOGY | Facility: CLINIC | Age: 41
End: 2021-02-26

## 2021-02-26 VITALS
OXYGEN SATURATION: 98 % | SYSTOLIC BLOOD PRESSURE: 149 MMHG | DIASTOLIC BLOOD PRESSURE: 80 MMHG | HEART RATE: 82 BPM | WEIGHT: 293 LBS | HEIGHT: 67 IN | TEMPERATURE: 97.8 F | BODY MASS INDEX: 45.99 KG/M2

## 2021-02-26 DIAGNOSIS — G47.33 OBSTRUCTIVE SLEEP APNEA: ICD-10-CM

## 2021-02-26 DIAGNOSIS — I10 ESSENTIAL HYPERTENSION: ICD-10-CM

## 2021-02-26 DIAGNOSIS — E66.01 MORBID OBESITY WITH BMI OF 45.0-49.9, ADULT (HCC): ICD-10-CM

## 2021-02-26 DIAGNOSIS — R06.02 SHORTNESS OF BREATH: Primary | ICD-10-CM

## 2021-02-26 PROCEDURE — 99204 OFFICE O/P NEW MOD 45 MIN: CPT | Performed by: INTERNAL MEDICINE

## 2021-02-26 NOTE — PROGRESS NOTES
Subjective:     Encounter Date:2021      Patient ID: Kyle Pennington is a 41 y.o. female.    Chief Complaint:  History of Present Illness 41-year-old white female with morbid obesity history of hypertension and sleep apnea presents to my office for new consultation.  Patient has been having symptoms of shortness of breath and swelling of the feet for the last several days of increased severity.  No complains of any chest pain.  No PND orthopnea.  No palpitation dizziness syncope.  She is been take her medicines regularly.  She has sleep apnea but she never had it tested and is not on any CPAP machine.  She is trying to lose weight and also have gastric bypass surgery done if possible.    The following portions of the patient's history were reviewed and updated as appropriate: allergies, current medications, past family history, past medical history, past social history, past surgical history and problem list.  Past Medical History:   Diagnosis Date   • Anxiety and depression    • Arthritis    • Bipolar affective (CMS/HCC)    • Breast injury    • Edema leg    • Fibrocystic breast    • GERD (gastroesophageal reflux disease)    • Hypertension    • Morbid obesity (CMS/HCC)      Past Surgical History:   Procedure Laterality Date   • ABDOMINAL SURGERY     •  SECTION     • COLONOSCOPY N/A 2020    Procedure: COLONOSCOPY to cecum and TI with cold snare polypectomy;  Surgeon: Giovanni Lara MD;  Location: [facility] ENDOSCOPY;  Service: Gastroenterology;  Laterality: N/A;  pre-iron def anemia  post-polyp   • D&C HYSTEROSCOPY N/A 2021    Procedure: DILATATION AND CURETTAGE HYSTEROSCOPY;  Surgeon: Power Vasquez MD;  Location: [facility] MAIN OR;  Service: Gynecology;  Laterality: N/A;   • ENDOSCOPY N/A 8/15/2019    Procedure: ESOPHAGOGASTRODUODENOSCOPY W/BIOPSY;  Surgeon: Lyn Gan MD;  Location: [facility] ENDOSCOPY;  Service: General   • ENDOSCOPY N/A 2020    Procedure:  "ESOPHAGOGASTRODUODENOSCOPY;  Surgeon: Giovanni Lara MD;  Location: [facility] ENDOSCOPY;  Service: Gastroenterology;  Laterality: N/A;  pre-iron def anemia  post-hiatal hernia   • GASTRIC BANDING REMOVAL N/A 10/17/2019    Procedure: GASTRIC BANDING REMOVAL LAPAROSCOPIC;  Surgeon: Lyn Gan MD;  Location: [facility] MAIN OR;  Service: General   • LAPAROSCOPIC GASTRIC BANDING     • LAPAROSCOPIC TUBAL LIGATION       /80   Pulse 82   Temp 97.8 °F (36.6 °C)   Ht 170.2 cm (67\")   Wt (!) 184 kg (406 lb 8 oz)   LMP 02/15/2021 (Exact Date)   SpO2 98%   BMI 63.67 kg/m²   Family History   Problem Relation Age of Onset   • Hypertension Mother    • Cancer Mother    • Skin cancer Mother    • Hypertension Father    • Heart attack Father    • Hypertension Sister    • Diabetes Sister    • Cancer Maternal Grandmother    • Lung cancer Maternal Grandmother    • Cancer Maternal Grandfather        Current Outpatient Medications:   •  buPROPion XL (WELLBUTRIN XL) 150 MG 24 hr tablet, Take 150 mg by mouth Daily. Take dos, Disp: , Rfl:   •  ferrous sulfate 325 (65 FE) MG tablet, Take 325 mg by mouth Daily With Breakfast. Hold last dose 2/17, Disp: , Rfl:   •  ibuprofen (ADVIL,MOTRIN) 200 MG tablet, Take 800 mg by mouth Every 6 (Six) Hours As Needed for Mild Pain  (leg pain)., Disp: , Rfl:   •  lisinopril-hydrochlorothiazide (PRINZIDE,ZESTORETIC) 20-25 MG per tablet, Take 1 tablet by mouth Daily. for blood pressure.last dos 2/17, Disp: , Rfl: 2  •  Multiple Vitamins-Calcium (ONE-A-DAY WOMENS PO), Take 1 tablet by mouth Daily. Last dose 2/13, Disp: , Rfl:   •  naproxen sodium (ALEVE) 220 MG tablet, Take 220 mg by mouth 2 (Two) Times a Day As Needed. Stop last dose 2/13, Disp: , Rfl:   •  omeprazole (priLOSEC) 40 MG capsule, TAKE 1 CAPSULE BY MOUTH EVERY DAY (Patient taking differently: Take 40 mg by mouth Daily. Take dos), Disp: 30 capsule, Rfl: 6  •  ondansetron ODT (ZOFRAN-ODT) 4 MG disintegrating tablet, Place 4 mg on the " tongue Every 8 (Eight) Hours As Needed for Nausea or Vomiting., Disp: , Rfl:   •  folic acid (FOLVITE) 1 MG tablet, Take 1 tablet by mouth Daily., Disp: 30 tablet, Rfl: 2  Allergies   Allergen Reactions   • Antihistamines, Diphenhydramine-Type Other (See Comments)     Bloody nose     Social History     Socioeconomic History   • Marital status: Single     Spouse name: Not on file   • Number of children: Not on file   • Years of education: Not on file   • Highest education level: Not on file   Tobacco Use   • Smoking status: Former Smoker     Quit date:      Years since quittin.1   • Smokeless tobacco: Never Used   Substance and Sexual Activity   • Alcohol use: Not Currently     Frequency: Never     Comment: 2 drinks per week, social    • Drug use: No   • Sexual activity: Defer     Review of Systems   Constitution: Negative for malaise/fatigue.   HENT: Negative for ear pain and nosebleeds.    Cardiovascular: Positive for leg swelling. Negative for chest pain, palpitations and syncope.   Respiratory: Positive for shortness of breath. Negative for cough.    Skin: Negative for rash.   Musculoskeletal: Negative for joint pain.   Gastrointestinal: Negative for abdominal pain, nausea and vomiting.   Neurological: Negative for dizziness, headaches, light-headedness and numbness.   Psychiatric/Behavioral: Negative for depression. The patient is not nervous/anxious.               Objective:     Constitutional:       Appearance: Well-developed.   Eyes:      General: No scleral icterus.     Conjunctiva/sclera: Conjunctivae normal.   HENT:      Head: Normocephalic and atraumatic.   Neck:      Musculoskeletal: Normal range of motion and neck supple.      Vascular: No carotid bruit or JVD.   Pulmonary:      Effort: Pulmonary effort is normal.      Breath sounds: Normal breath sounds. No wheezing. No rales.   Cardiovascular:      Normal rate. Regular rhythm.   Pulses:     Intact distal pulses.   Abdominal:      General:  Bowel sounds are normal.      Palpations: Abdomen is soft.   Skin:     General: Skin is warm and dry.      Findings: No rash.   Neurological:      Mental Status: Alert.       Procedures    Lab Review:         MDM   1 shortness of breath or swelling of the feet  Patient has been having symptoms of shortness of breath and swelling of the feet and hence we will perform an echocardiogram for LV function valve abnormalities  2.  Morbid obesity  3.  Obstructive sleep apnea  Patient has sleep apnea but does not use a CPAP machine  4.  Hypertension  Patient's blood pressure is currently stable on medical therapy

## 2021-02-26 NOTE — TELEPHONE ENCOUNTER
At checkout pt requested letter to be off of work. She works at [employer] and they don't have light duty. She is not able to stand for long periods of time and not able to wear her work shoes. She also has a hard wearing a mask. Please advise? Pt is scheduled for her echo on 3/10.

## 2021-03-10 ENCOUNTER — HOSPITAL ENCOUNTER (OUTPATIENT)
Dept: CARDIOLOGY | Facility: HOSPITAL | Age: 41
Discharge: HOME | End: 2021-03-10
Admitting: INTERNAL MEDICINE

## 2021-03-10 VITALS
BODY MASS INDEX: 45.99 KG/M2 | DIASTOLIC BLOOD PRESSURE: 91 MMHG | WEIGHT: 293 LBS | SYSTOLIC BLOOD PRESSURE: 158 MMHG | HEIGHT: 67 IN

## 2021-03-10 DIAGNOSIS — E66.01 MORBID OBESITY WITH BMI OF 45.0-49.9, ADULT (HCC): ICD-10-CM

## 2021-03-10 DIAGNOSIS — G47.33 OBSTRUCTIVE SLEEP APNEA: ICD-10-CM

## 2021-03-10 DIAGNOSIS — R06.02 SHORTNESS OF BREATH: ICD-10-CM

## 2021-03-10 DIAGNOSIS — I10 ESSENTIAL HYPERTENSION: ICD-10-CM

## 2021-03-10 LAB
BH CV ECHO MEAS - ACS: 2.1 CM
BH CV ECHO MEAS - AO MAX PG (FULL): 2 MMHG
BH CV ECHO MEAS - AO MAX PG: 7 MMHG
BH CV ECHO MEAS - AO MEAN PG (FULL): 1.7 MMHG
BH CV ECHO MEAS - AO MEAN PG: 4.6 MMHG
BH CV ECHO MEAS - AO ROOT AREA (BSA CORRECTED): 1.1
BH CV ECHO MEAS - AO ROOT AREA: 7.4 CM^2
BH CV ECHO MEAS - AO ROOT DIAM: 3.1 CM
BH CV ECHO MEAS - AO V2 MAX: 131.8 CM/SEC
BH CV ECHO MEAS - AO V2 MEAN: 104.1 CM/SEC
BH CV ECHO MEAS - AO V2 VTI: 27.1 CM
BH CV ECHO MEAS - ASC AORTA: 2.9 CM
BH CV ECHO MEAS - AVA(I,A): 2.9 CM^2
BH CV ECHO MEAS - AVA(I,D): 2.9 CM^2
BH CV ECHO MEAS - AVA(V,A): 3 CM^2
BH CV ECHO MEAS - AVA(V,D): 3 CM^2
BH CV ECHO MEAS - BSA(HAYCOCK): 3.1 M^2
BH CV ECHO MEAS - BSA: 2.7 M^2
BH CV ECHO MEAS - BZI_BMI: 63.6 KILOGRAMS/M^2
BH CV ECHO MEAS - BZI_METRIC_HEIGHT: 170.2 CM
BH CV ECHO MEAS - BZI_METRIC_WEIGHT: 184.2 KG
BH CV ECHO MEAS - EDV(CUBED): 73.9 ML
BH CV ECHO MEAS - EDV(MOD-SP4): 96.4 ML
BH CV ECHO MEAS - EDV(TEICH): 78.5 ML
BH CV ECHO MEAS - EF(CUBED): 84.1 %
BH CV ECHO MEAS - EF(MOD-SP4): 70.6 %
BH CV ECHO MEAS - EF(TEICH): 77.5 %
BH CV ECHO MEAS - ESV(CUBED): 11.8 ML
BH CV ECHO MEAS - ESV(MOD-SP4): 28.4 ML
BH CV ECHO MEAS - ESV(TEICH): 17.6 ML
BH CV ECHO MEAS - FS: 45.8 %
BH CV ECHO MEAS - IVS/LVPW: 1.7
BH CV ECHO MEAS - IVSD: 1.3 CM
BH CV ECHO MEAS - LA DIMENSION: 5.1 CM
BH CV ECHO MEAS - LA/AO: 1.7
BH CV ECHO MEAS - LV DIASTOLIC VOL/BSA (35-75): 35.3 ML/M^2
BH CV ECHO MEAS - LV MASS(C)D: 148.7 GRAMS
BH CV ECHO MEAS - LV MASS(C)DI: 54.4 GRAMS/M^2
BH CV ECHO MEAS - LV MAX PG: 4.9 MMHG
BH CV ECHO MEAS - LV MEAN PG: 2.9 MMHG
BH CV ECHO MEAS - LV SYSTOLIC VOL/BSA (12-30): 10.4 ML/M^2
BH CV ECHO MEAS - LV V1 MAX: 111 CM/SEC
BH CV ECHO MEAS - LV V1 MEAN: 81.9 CM/SEC
BH CV ECHO MEAS - LV V1 VTI: 21.5 CM
BH CV ECHO MEAS - LVIDD: 4.2 CM
BH CV ECHO MEAS - LVIDS: 2.3 CM
BH CV ECHO MEAS - LVOT AREA: 3.6 CM^2
BH CV ECHO MEAS - LVOT DIAM: 2.1 CM
BH CV ECHO MEAS - LVPWD: 0.8 CM
BH CV ECHO MEAS - MV A MAX VEL: 94 CM/SEC
BH CV ECHO MEAS - MV DEC SLOPE: 548.2 CM/SEC^2
BH CV ECHO MEAS - MV DEC TIME: 0.16 SEC
BH CV ECHO MEAS - MV E MAX VEL: 86 CM/SEC
BH CV ECHO MEAS - MV E/A: 0.92
BH CV ECHO MEAS - MV MAX PG: 4.6 MMHG
BH CV ECHO MEAS - MV MEAN PG: 3.3 MMHG
BH CV ECHO MEAS - MV V2 MAX: 107.1 CM/SEC
BH CV ECHO MEAS - MV V2 MEAN: 89.2 CM/SEC
BH CV ECHO MEAS - MV V2 VTI: 23.3 CM
BH CV ECHO MEAS - MVA(VTI): 3.4 CM^2
BH CV ECHO MEAS - PULM SYS VEL: 76.6 CM/SEC
BH CV ECHO MEAS - RAP SYSTOLE: 3 MMHG
BH CV ECHO MEAS - RVDD: 2.7 CM
BH CV ECHO MEAS - RVSP: 18 MMHG
BH CV ECHO MEAS - SI(AO): 73.7 ML/M^2
BH CV ECHO MEAS - SI(CUBED): 22.8 ML/M^2
BH CV ECHO MEAS - SI(LVOT): 28.5 ML/M^2
BH CV ECHO MEAS - SI(MOD-SP4): 24.9 ML/M^2
BH CV ECHO MEAS - SI(TEICH): 22.3 ML/M^2
BH CV ECHO MEAS - SV(AO): 201.3 ML
BH CV ECHO MEAS - SV(CUBED): 62.2 ML
BH CV ECHO MEAS - SV(LVOT): 78 ML
BH CV ECHO MEAS - SV(MOD-SP4): 68.1 ML
BH CV ECHO MEAS - SV(TEICH): 60.8 ML
BH CV ECHO MEAS - TR MAX VEL: 194 CM/SEC
MAXIMAL PREDICTED HEART RATE: 178 BPM
STRESS TARGET HR: 151 BPM

## 2021-03-10 PROCEDURE — 93306 TTE W/DOPPLER COMPLETE: CPT

## 2021-03-10 PROCEDURE — 93306 TTE W/DOPPLER COMPLETE: CPT | Performed by: INTERNAL MEDICINE

## 2021-03-10 PROCEDURE — 25010000002 SULFUR HEXAFLUORIDE MICROSPH 60.7-25 MG RECONSTITUTED SUSPENSION: Performed by: INTERNAL MEDICINE

## 2021-03-10 RX ADMIN — SULFUR HEXAFLUORIDE 5 ML: KIT at 10:54

## 2021-03-25 ENCOUNTER — TELEPHONE (OUTPATIENT)
Dept: ONCOLOGY | Facility: CLINIC | Age: 41
End: 2021-03-25

## 2021-03-25 NOTE — TELEPHONE ENCOUNTER
Caller: GI    Relationship to patient: PATIENT     Best call back number: [phone]     What form or medical record are you requesting: LAB ORDER    How would you like to receive the form or medical records (pick-up, mail, fax): MAIL    If mail, what is the address: [address] [place] [address] [place] [id number]    Timeframe paperwork needed: ASAP, PRIOR TO HER 04/21 FOLLOW UP WITH DR WORTHY    Additional notes: PT LOST HER LAB ORDER, REQUESTING ANOTHER TO BE MAILED TO HER

## 2021-04-20 ENCOUNTER — TELEPHONE (OUTPATIENT)
Dept: ONCOLOGY | Facility: CLINIC | Age: 41
End: 2021-04-20

## 2021-04-20 PROBLEM — R60.9 EDEMA: Status: ACTIVE | Noted: 2021-04-20

## 2021-04-20 PROBLEM — N93.9 VAGINAL BLEEDING: Status: ACTIVE | Noted: 2021-04-20

## 2021-04-20 PROBLEM — R06.02 SHORTNESS OF BREATH: Status: ACTIVE | Noted: 2021-04-20

## 2021-04-20 PROBLEM — N85.00 ENDOMETRIAL HYPERPLASIA: Status: ACTIVE | Noted: 2021-04-20

## 2021-04-20 PROBLEM — D64.9 ANEMIA: Status: ACTIVE | Noted: 2021-04-20

## 2021-04-20 PROBLEM — I10 HYPERTENSION: Status: ACTIVE | Noted: 2021-04-20

## 2021-04-20 PROBLEM — F41.9 ANXIETY: Status: ACTIVE | Noted: 2021-04-20

## 2021-04-20 PROBLEM — F32.A DEPRESSIVE DISORDER: Status: ACTIVE | Noted: 2021-04-20

## 2021-04-20 RX ORDER — OMEPRAZOLE 10 MG/1
40 CAPSULE, DELAYED RELEASE ORAL
COMMUNITY
Start: 2021-03-09 | End: 2021-04-21

## 2021-04-20 RX ORDER — ASPIRIN 81 MG
100 TABLET, DELAYED RELEASE (ENTERIC COATED) ORAL
COMMUNITY
Start: 2021-03-23 | End: 2021-10-29

## 2021-04-20 RX ORDER — HYDROCODONE BITARTRATE AND ACETAMINOPHEN 5; 325 MG/1; MG/1
TABLET ORAL
COMMUNITY
Start: 2021-03-23 | End: 2021-04-23

## 2021-04-20 NOTE — PROGRESS NOTES
Hematology/Oncology Outpatient Follow Up    PATIENT NAME:Kyle Pennington  :1980  MRN: [id number]  PRIMARY CARE PHYSICIAN: Geeta Hathaway APRN  REFERRING PHYSICIAN: Geeta Hathaway APRN    Chief Complaint   Patient presents with   • Follow-up     Iron deficiency anemia        HISTORY OF PRESENT ILLNESS:     This is a 40-year-old female who has been referred secondary to anemia.  Patient has a history of having gastric sleeve surgery in .  At that time she received IV iron infusion.  Over the course of time she has become progressively anemic.  Over the past 6 months she has been on oral iron supplementation.     Review of her CBC from 2020, white count was 6, hemoglobin 11.5, MCV was 71 and platelets were 283.  Differentials were 60% neutrophils, 23% lymphocytes, there was no monocytosis, eosinophilia or basophilia.  Patient had a chemistry panel creatinine was 0.65 and liver function tests were normal.  Review of her CBC from 2020 white count was 6.8, hemoglobin 9.5 and platelets were 334.  2020 white count was 9.2, hemoglobin 10.3 and platelets with 391.  On 2020 patient had a EGD and colonoscopy done which showed hiatal hernia and transverse colonic polyp.  This was performed by Dr. Lara.     She also has a history of hematuria for which she was [facility]  She has had menorrhagia and is scheduled for partial hysterectomy on 2020 by Dr. Vasquez.     Patient has been referred due to anemia.  Complains of extreme tiredness    · 10/21/2020 patient had anemia labs done.  White count was 6.2, hemoglobin 11.8, MCV was low at 76 and platelets were 3 20,000.  Differential essentially unremarkable.  SPEP with [facility] did not show any evidence of monoclonal protein.  Serum ferritin was low at 12, B12 was low normal at 292 and folate was low normal at 6.  Reticulocyte count was normal at 1.4%.  · Patient is scheduled for partial hysterectomy to  be performed by Dr. Vasquez on 2020 but was denied by her insurance company.  She is now scheduled for D&C first week in January  · 2020 she had a methylmalonic acid level which was normal, urinalysis was negative  · 2020 patient had IV Injectafer and also on 2020  · 21: Endometrial curretage showed complex atypical hyperplasia but no radha malignancy was identified.  · 21: Ferritn was 27, wbc 10, hemglobin is 13 and platekets are 318      Past Medical History:   Diagnosis Date   • Anxiety and depression    • Arthritis    • Bipolar affective (CMS/HCC)    • Breast injury    • Edema leg    • Fibrocystic breast    • GERD (gastroesophageal reflux disease)    • Hypertension    • Morbid obesity (CMS/HCC)        Past Surgical History:   Procedure Laterality Date   • ABDOMINAL SURGERY     •  SECTION     • COLONOSCOPY N/A 2020    Procedure: COLONOSCOPY to cecum and TI with cold snare polypectomy;  Surgeon: Giovanni Lara MD;  Location: [facility] ENDOSCOPY;  Service: Gastroenterology;  Laterality: N/A;  pre-iron def anemia  post-polyp   • D & C CERVICAL BIOPSY     • D & C HYSTEROSCOPY N/A 2021    Procedure: DILATATION AND CURETTAGE HYSTEROSCOPY;  Surgeon: Power Vasquez MD;  Location: [facility] MAIN OR;  Service: Gynecology;  Laterality: N/A;   • ENDOSCOPY N/A 8/15/2019    Procedure: ESOPHAGOGASTRODUODENOSCOPY W/BIOPSY;  Surgeon: Lyn Gan MD;  Location: [facility] ENDOSCOPY;  Service: General   • ENDOSCOPY N/A 2020    Procedure: ESOPHAGOGASTRODUODENOSCOPY;  Surgeon: Giovanni Lara MD;  Location: [facility] ENDOSCOPY;  Service: Gastroenterology;  Laterality: N/A;  pre-iron def anemia  post-hiatal hernia   • GASTRIC BANDING REMOVAL N/A 10/17/2019    Procedure: GASTRIC BANDING REMOVAL LAPAROSCOPIC;  Surgeon: Lyn Gan MD;  Location: [facility] MAIN OR;  Service: General   • HYSTERECTOMY     • LAPAROSCOPIC GASTRIC BANDING     • LAPAROSCOPIC TUBAL LIGATION            Current Outpatient Medications:   •  buPROPion XL (WELLBUTRIN XL) 150 MG 24 hr tablet, Take 150 mg by mouth Daily. Take dos, Disp: , Rfl:   •  Docusate Sodium 100 MG capsule, 100 mg., Disp: , Rfl:   •  ferrous sulfate 325 (65 FE) MG tablet, Take 325 mg by mouth Daily With Breakfast. Hold last dose , Disp: , Rfl:   •  folic acid (FOLVITE) 1 MG tablet, Take 1 tablet by mouth Daily., Disp: 30 tablet, Rfl: 2  •  HYDROcodone-acetaminophen (Norco) 5-325 MG per tablet, , Disp: , Rfl:   •  ibuprofen (ADVIL,MOTRIN) 200 MG tablet, Take 800 mg by mouth Every 6 (Six) Hours As Needed for Mild Pain  (leg pain)., Disp: , Rfl:   •  Multiple Vitamins-Calcium (ONE-A-DAY WOMENS PO), Take 1 tablet by mouth Daily. Last dose , Disp: , Rfl:   •  naproxen sodium (ALEVE) 220 MG tablet, Take 220 mg by mouth 2 (Two) Times a Day As Needed. Stop last dose , Disp: , Rfl:   •  omeprazole (priLOSEC) 40 MG capsule, TAKE 1 CAPSULE BY MOUTH EVERY DAY (Patient taking differently: Take 40 mg by mouth Daily. Take dos), Disp: 30 capsule, Rfl: 6  •  ondansetron ODT (ZOFRAN-ODT) 4 MG disintegrating tablet, Place 4 mg on the tongue Every 8 (Eight) Hours As Needed for Nausea or Vomiting., Disp: , Rfl:     Allergies   Allergen Reactions   • Antihistamines, Diphenhydramine-Type Other (See Comments)     Bloody nose       Family History   Problem Relation Age of Onset   • Hypertension Mother    • Cancer Mother    • Skin cancer Mother    • Hypertension Father    • Heart attack Father    • Hypertension Sister    • Diabetes Sister    • Cancer Maternal Grandmother    • Lung cancer Maternal Grandmother    • Cancer Maternal Grandfather        Cancer-related family history includes Cancer in her maternal grandfather, maternal grandmother, and mother; Lung cancer in her maternal grandmother; Skin cancer in her mother.    Social History     Tobacco Use   • Smoking status: Former Smoker     Quit date: 2000     Years since quittin.3   • Smokeless  "tobacco: Never Used   Vaping Use   • Vaping Use: Never used   Substance Use Topics   • Alcohol use: Not Currently     Comment: 2 drinks per week, social    • Drug use: No       HPI, ROS and PFSH have been reviewed and confirmed on 4/21/2021.     SUBJECTIVE:    She has no specific complaints today.  Patient had a partial hysterectomy at [facility] in March 2021        REVIEW OF SYSTEMS:  Review of Systems   Constitutional: Negative for chills and fever.   HENT: Negative for ear pain, mouth sores, nosebleeds and sore throat.    Eyes: Negative for photophobia and visual disturbance.   Respiratory: Negative for wheezing and stridor.    Cardiovascular: Negative for chest pain and palpitations.   Gastrointestinal: Negative for abdominal pain, diarrhea, nausea and vomiting.   Endocrine: Negative for cold intolerance and heat intolerance.   Genitourinary: Negative for dysuria and hematuria.   Musculoskeletal: Negative for joint swelling and neck stiffness.   Skin: Negative for color change and rash.   Neurological: Negative for seizures and syncope.   Hematological: Negative for adenopathy.        Increased menstrual flow   Psychiatric/Behavioral: Negative for agitation, confusion and hallucinations.     I have reviewed and confirmed the accuracy of the ROS as documented by the MA/LPN/RN Sisi Gaviria MD    OBJECTIVE:    Vitals:    04/21/21 0908   BP: (!) 154/102  Comment: changed BP medications yesterday   Pulse: 73   Resp: 18   Temp: 98 °F (36.7 °C)   Weight: (!) 184 kg (406 lb)   Height: 170.2 cm (67\")   PainSc:   8   PainLoc: Foot     Body mass index is 63.59 kg/m².    ECOG  (1) Restricted in physically strenuous activity, ambulatory and able to do work of light nature    Physical Exam  Constitutional:       Appearance: Normal appearance. She is not toxic-appearing or diaphoretic.   HENT:      Head: Normocephalic and atraumatic.      Right Ear: External ear normal.      Left Ear: External ear " normal.      Nose: Nose normal.      Mouth/Throat:      Mouth: Mucous membranes are moist.   Eyes:      Extraocular Movements: Extraocular movements intact.      Conjunctiva/sclera: Conjunctivae normal.      Pupils: Pupils are equal, round, and reactive to light.   Pulmonary:      Effort: Pulmonary effort is normal.   Musculoskeletal:         General: Normal range of motion.   Neurological:      General: No focal deficit present.      Mental Status: She is alert and oriented to person, place, and time.   Psychiatric:         Mood and Affect: Mood normal.         Behavior: Behavior normal.         Thought Content: Thought content normal.         Judgment: Judgment normal.     I have reexamined the patient and the results are consistent with the previously documented exam. Sisi Gaviria MD     RECENT LABS    WBC   Date Value Ref Range Status   02/12/2021 6.57 3.40 - 10.80 10*3/mm3 Final     RBC   Date Value Ref Range Status   02/12/2021 4.86 3.77 - 5.28 10*6/mm3 Final     Hemoglobin   Date Value Ref Range Status   02/12/2021 13.3 12.0 - 15.9 g/dL Final     Hematocrit   Date Value Ref Range Status   02/12/2021 41.1 34.0 - 46.6 % Final     MCV   Date Value Ref Range Status   02/12/2021 84.6 79.0 - 97.0 fL Final     MCH   Date Value Ref Range Status   02/12/2021 27.4 26.6 - 33.0 pg Final     MCHC   Date Value Ref Range Status   02/12/2021 32.4 31.5 - 35.7 g/dL Final     RDW   Date Value Ref Range Status   02/12/2021 14.0 12.3 - 15.4 % Final     RDW-SD   Date Value Ref Range Status   02/12/2021 43.0 37.0 - 54.0 fl Final     MPV   Date Value Ref Range Status   02/12/2021 10.7 6.0 - 12.0 fL Final     Platelets   Date Value Ref Range Status   02/12/2021 252 140 - 450 10*3/mm3 Final     Neutrophil %   Date Value Ref Range Status   02/12/2021 69.7 42.7 - 76.0 % Final     Lymphocyte %   Date Value Ref Range Status   02/12/2021 19.5 (L) 19.6 - 45.3 % Final     Monocyte %   Date Value Ref Range Status   02/12/2021 7.8  5.0 - 12.0 % Final     Eosinophil %   Date Value Ref Range Status   02/12/2021 2.0 0.3 - 6.2 % Final     Basophil %   Date Value Ref Range Status   02/12/2021 0.5 0.0 - 1.5 % Final     Immature Grans %   Date Value Ref Range Status   02/12/2021 0.5 0.0 - 0.5 % Final     Neutrophils, Absolute   Date Value Ref Range Status   02/12/2021 4.59 1.70 - 7.00 10*3/mm3 Final     Lymphocytes, Absolute   Date Value Ref Range Status   02/12/2021 1.28 0.70 - 3.10 10*3/mm3 Final     Monocytes, Absolute   Date Value Ref Range Status   02/12/2021 0.51 0.10 - 0.90 10*3/mm3 Final     Eosinophils, Absolute   Date Value Ref Range Status   02/12/2021 0.13 0.00 - 0.40 10*3/mm3 Final     Basophils, Absolute   Date Value Ref Range Status   02/12/2021 0.03 0.00 - 0.20 10*3/mm3 Final     Immature Grans, Absolute   Date Value Ref Range Status   02/12/2021 0.03 0.00 - 0.05 10*3/mm3 Final     nRBC   Date Value Ref Range Status   02/12/2021 0.0 0.0 - 0.2 /100 WBC Final       Lab Results   Component Value Date    GLUCOSE 118 (H) 02/12/2021    BUN 16 02/12/2021    CREATININE 0.65 02/12/2021    EGFRIFNONA 100 02/12/2021    BCR 24.6 02/12/2021    K 4.6 02/12/2021    CO2 32.4 (H) 02/12/2021    CALCIUM 9.6 02/12/2021    ALBUMIN 3.80 09/27/2020    AST 21 09/27/2020    ALT 28 09/27/2020         Assessment/Plan     There are no diagnoses linked to this encounter.    ASSESSMENT:    1. Microcytic anemia due to iron deficiency from menorrhagia, gastric sleeve surgery: Status post IV Injectafer 11/12/2020. Last ferritin was 27, hemglobin is 13g/dl> Will therefore continue oral iron supplementation for now.  Her hemoglobin has increased to 13 g per DL.  Patient continues to tolerate oral iron supplementation.  We will treat for additional 6 months and then discontinue.  2. Mild folate deficiency  3. Low normal B12 level but normal MMA  4. Status post partial hysterectomy 3/23/2021 for atypical endometrial hyperplasia at [facility].   Performed by Dr. Vasquez: I requested for her pathology report  5. Recent COVID-19 infection  6. Status post colonoscopy August 2020  7. History of gastric sleeve surgery  8. History of hematuria: Seen by Dr. Ricks  9. Assessment has been reviewed and updated           Plans     · Continue iron supplementation  · Requested for path report from [facility] for partial hysterectomy  · Follow-up in 6 months with CBC and ferritin level done a few days prior to the appointment  · I have given patient 6 months refills of iron and would likely discontinue at the next visit  · Urinalysis was negative for hematuria  · Call earlier as needed for problems  · All questions answered        Thank you very much for allowing us to participate in the care of Kyle, I will keep you updated on her progress        Patient verbalized understanding and is in agreement of the above plan.

## 2021-04-21 ENCOUNTER — OFFICE VISIT (OUTPATIENT)
Dept: ONCOLOGY | Facility: CLINIC | Age: 41
End: 2021-04-21

## 2021-04-21 VITALS
HEIGHT: 67 IN | HEART RATE: 73 BPM | SYSTOLIC BLOOD PRESSURE: 154 MMHG | BODY MASS INDEX: 45.99 KG/M2 | WEIGHT: 293 LBS | DIASTOLIC BLOOD PRESSURE: 102 MMHG | RESPIRATION RATE: 18 BRPM | TEMPERATURE: 98 F

## 2021-04-21 DIAGNOSIS — D50.9 IRON DEFICIENCY ANEMIA, UNSPECIFIED IRON DEFICIENCY ANEMIA TYPE: Primary | ICD-10-CM

## 2021-04-21 DIAGNOSIS — D64.9 ANEMIA, UNSPECIFIED TYPE: ICD-10-CM

## 2021-04-21 PROCEDURE — 99214 OFFICE O/P EST MOD 30 MIN: CPT | Performed by: INTERNAL MEDICINE

## 2021-04-21 RX ORDER — FERROUS SULFATE 325(65) MG
325 TABLET ORAL
Qty: 90 TABLET | Refills: 3 | Status: SHIPPED | OUTPATIENT
Start: 2021-04-21 | End: 2021-09-07 | Stop reason: SDUPTHER

## 2021-05-05 RX ORDER — OMEPRAZOLE 40 MG/1
CAPSULE, DELAYED RELEASE ORAL
Qty: 30 CAPSULE | Refills: 6 | Status: SHIPPED | OUTPATIENT
Start: 2021-05-05 | End: 2021-11-11 | Stop reason: SDUPTHER

## 2021-07-28 ENCOUNTER — TELEPHONE (OUTPATIENT)
Dept: BARIATRICS/WEIGHT MGMT | Facility: CLINIC | Age: 41
End: 2021-07-28

## 2021-07-28 NOTE — TELEPHONE ENCOUNTER
Called Kyle back about surgery, she stated she is having a lot of health issues after her hysterectomy, she had gain 76lbs and her PCP is unsure why, she is going to see a cardiologist and Pulmonologist and will let us know what they say and hopefully get her schedule for surgery soon.

## 2021-09-07 DIAGNOSIS — D64.9 ANEMIA, UNSPECIFIED TYPE: ICD-10-CM

## 2021-09-07 DIAGNOSIS — D50.9 IRON DEFICIENCY ANEMIA, UNSPECIFIED IRON DEFICIENCY ANEMIA TYPE: ICD-10-CM

## 2021-09-07 RX ORDER — FERROUS SULFATE 325(65) MG
325 TABLET ORAL
Qty: 90 TABLET | Refills: 3 | Status: ON HOLD | OUTPATIENT
Start: 2021-09-07 | End: 2021-12-03

## 2021-09-07 NOTE — TELEPHONE ENCOUNTER
What additional details did the patient provide when requesting the medication: PATIENT CALLED TODAY JUST TO SWITCH HER PHARMACY INFORMATION.  SHE WILL NO LONGER USE [facility] AND HAS SWITCHED TO [facility] AS LISTED BELOW.    What is the patient's preferred pharmacy: [facility]  [place], [place] - [address]  - [phone] [facility] [phone] FX     NO RESPONSE REQUIRED!! DID NOT NEED TO ROUT THIS--MISUNDERSTANDING WITH PATIENT

## 2021-10-05 ENCOUNTER — TELEPHONE (OUTPATIENT)
Dept: ONCOLOGY | Facility: CLINIC | Age: 41
End: 2021-10-05

## 2021-10-05 NOTE — TELEPHONE ENCOUNTER
Provider: DR WORTHY  Caller: GI  Relationship to Patient: SELF  Phone Number: [phone]  Reason for Call: PT NEEDS TO CANCEL APPT ON 10/06 BECAUSE OF COVID EXPOSURE

## 2021-10-05 NOTE — TELEPHONE ENCOUNTER
Returned call to patient to reschedule appointment.  Appointment given in [place] office on 11/3/21.  Patient v/u.

## 2021-10-06 ENCOUNTER — APPOINTMENT (OUTPATIENT)
Dept: ONCOLOGY | Facility: CLINIC | Age: 41
End: 2021-10-06

## 2021-10-26 ENCOUNTER — PREP FOR SURGERY (OUTPATIENT)
Dept: OTHER | Facility: HOSPITAL | Age: 41
End: 2021-10-26

## 2021-10-26 DIAGNOSIS — E66.01 MORBID OBESITY WITH BMI OF 45.0-49.9, ADULT (HCC): Primary | ICD-10-CM

## 2021-10-26 RX ORDER — PROMETHAZINE HYDROCHLORIDE 12.5 MG/1
12.5 TABLET ORAL ONCE
Status: CANCELLED | OUTPATIENT
Start: 2021-10-26 | End: 2021-10-26

## 2021-10-26 RX ORDER — SODIUM CHLORIDE 0.9 % (FLUSH) 0.9 %
3 SYRINGE (ML) INJECTION EVERY 12 HOURS SCHEDULED
Status: CANCELLED | OUTPATIENT
Start: 2021-10-26

## 2021-10-26 RX ORDER — GABAPENTIN 250 MG/5ML
300 SOLUTION ORAL ONCE
Status: CANCELLED | OUTPATIENT
Start: 2021-10-26 | End: 2021-10-26

## 2021-10-26 RX ORDER — PANTOPRAZOLE SODIUM 40 MG/10ML
40 INJECTION, POWDER, LYOPHILIZED, FOR SOLUTION INTRAVENOUS ONCE
Status: CANCELLED | OUTPATIENT
Start: 2021-10-26 | End: 2021-10-26

## 2021-10-26 RX ORDER — SCOLOPAMINE TRANSDERMAL SYSTEM 1 MG/1
1 PATCH, EXTENDED RELEASE TRANSDERMAL ONCE
Status: CANCELLED | OUTPATIENT
Start: 2021-10-26 | End: 2021-10-26

## 2021-10-26 RX ORDER — CHLORHEXIDINE GLUCONATE 0.12 MG/ML
15 RINSE ORAL SEE ADMIN INSTRUCTIONS
Status: CANCELLED | OUTPATIENT
Start: 2021-10-26

## 2021-10-26 RX ORDER — CEFAZOLIN SODIUM IN 0.9 % NACL 3 G/100 ML
3 INTRAVENOUS SOLUTION, PIGGYBACK (ML) INTRAVENOUS
Status: CANCELLED | OUTPATIENT
Start: 2021-10-26

## 2021-10-26 RX ORDER — SODIUM CHLORIDE, SODIUM LACTATE, POTASSIUM CHLORIDE, CALCIUM CHLORIDE 600; 310; 30; 20 MG/100ML; MG/100ML; MG/100ML; MG/100ML
100 INJECTION, SOLUTION INTRAVENOUS CONTINUOUS
Status: CANCELLED | OUTPATIENT
Start: 2021-10-26

## 2021-10-26 RX ORDER — SODIUM CHLORIDE 0.9 % (FLUSH) 0.9 %
3-10 SYRINGE (ML) INJECTION AS NEEDED
Status: CANCELLED | OUTPATIENT
Start: 2021-10-26

## 2021-10-26 RX ORDER — PROMETHAZINE HYDROCHLORIDE 25 MG/1
25 SUPPOSITORY RECTAL ONCE
Status: CANCELLED | OUTPATIENT
Start: 2021-10-26

## 2021-10-29 ENCOUNTER — CONSULT (OUTPATIENT)
Dept: BARIATRICS/WEIGHT MGMT | Facility: CLINIC | Age: 41
End: 2021-10-29

## 2021-10-29 VITALS
HEART RATE: 73 BPM | SYSTOLIC BLOOD PRESSURE: 149 MMHG | DIASTOLIC BLOOD PRESSURE: 104 MMHG | HEIGHT: 67 IN | WEIGHT: 293 LBS | BODY MASS INDEX: 45.99 KG/M2 | RESPIRATION RATE: 18 BRPM | OXYGEN SATURATION: 95 % | TEMPERATURE: 97.5 F

## 2021-10-29 DIAGNOSIS — E66.01 OBESITY, CLASS III, BMI 40-49.9 (MORBID OBESITY) (HCC): Primary | ICD-10-CM

## 2021-10-29 PROCEDURE — 99214 OFFICE O/P EST MOD 30 MIN: CPT | Performed by: SURGERY

## 2021-10-29 NOTE — PROGRESS NOTES
Bariatric Consult:  Referred by Geeta Hathaway APRN    Kyle Pennington is here today for consult on Consult (Pre Op sleeve 11/16)      History of Present Illness:     Kyle Pennington is a 41 y.o. female with morbid obesity with co-morbidities including GERD, hypertension who presents for surgical consultation for the above procedure. Kyle has completed the initial intake visit and has been examined by our nurse practitioner, dietician, psychologist and underwent the extensive educational teaching process under the guidance of our bariatric coordinator and myself. Kyle also has seen the educational video MOLLY on the surgical procedure if available. Kyle attended today more educational teaching from our bariatric coordinator and myself. Kyle has had an extensive medical workup including a visit with their primary care physician, EKG, chest radiograph, blood work, EGD or UGI and possibly further testing. These have been reviewed by me and discussed with the patient. Kyle is now ready to proceed with surgery. Kyle presently denies nausea, vomiting, fever, chills, chest pain, shortness of air, melena, hematochezia, hemetemesis, dysuria, frequency, hematuria, jaundice or abdominal pain.     Wt Readings from Last 10 Encounters:   10/29/21 (!) 198 kg (435 lb 12.8 oz)   04/21/21 (!) 184 kg (406 lb)   03/10/21 (!) 184 kg (406 lb)   02/26/21 (!) 184 kg (406 lb 8 oz)   02/18/21 (!) 182 kg (401 lb 3.8 oz)   01/04/21 (!) 174 kg (382 lb 11.5 oz)   11/19/20 (!) 171 kg (376 lb)   11/12/20 (!) 170 kg (375 lb)   10/09/20 (!) 161 kg (354 lb 3.2 oz)   09/29/20 (!) 161 kg (354 lb 6.4 oz)       Her pre-op EGD shows small hiatal hernia    Past Medical History:   Diagnosis Date   • Anxiety and depression    • Arthritis    • Bipolar affective (HCC)    • Breast injury    • Edema leg    • Fibrocystic breast    • GERD (gastroesophageal reflux disease)    • Hypertension    • Morbid obesity (HCC)        No  diagnosis found.    Past Surgical History:   Procedure Laterality Date   • ABDOMINAL SURGERY     •  SECTION     • COLONOSCOPY N/A 2020    Procedure: COLONOSCOPY to cecum and TI with cold snare polypectomy;  Surgeon: Giovanni Lara MD;  Location: [facility] ENDOSCOPY;  Service: Gastroenterology;  Laterality: N/A;  pre-iron def anemia  post-polyp   • D & C CERVICAL BIOPSY     • D & C HYSTEROSCOPY N/A 2021    Procedure: DILATATION AND CURETTAGE HYSTEROSCOPY;  Surgeon: Power Vasquez MD;  Location: [facility] MAIN OR;  Service: Gynecology;  Laterality: N/A;   • ENDOSCOPY N/A 8/15/2019    Procedure: ESOPHAGOGASTRODUODENOSCOPY W/BIOPSY;  Surgeon: Lyn Gan MD;  Location: [facility] ENDOSCOPY;  Service: General   • ENDOSCOPY N/A 2020    Procedure: ESOPHAGOGASTRODUODENOSCOPY;  Surgeon: Giovanni Lara MD;  Location: [facility] ENDOSCOPY;  Service: Gastroenterology;  Laterality: N/A;  pre-iron def anemia  post-hiatal hernia   • GASTRIC BANDING REMOVAL N/A 10/17/2019    Procedure: GASTRIC BANDING REMOVAL LAPAROSCOPIC;  Surgeon: Lyn Gan MD;  Location: [facility] MAIN OR;  Service: General   • HYSTERECTOMY     • LAPAROSCOPIC GASTRIC BANDING     • LAPAROSCOPIC TUBAL LIGATION         Patient Active Problem List   Diagnosis   • Morbid obesity (HCC)   • Advanced maternal age (AMA) in pregnancy   • Chronic hypertension in pregnancy   • History of laparoscopic adjustable gastric banding   • Twin pregnancy in second trimester   • BMI 50.0-59.9, adult (Formerly [facility])   • Pre-operative examination   • GERD (gastroesophageal reflux disease)   • Iron deficiency anemia   • Iron malabsorption   • Anxiety   • Vaginal bleeding   • Anemia   • Depressive disorder   • Edema   • Endometrial hyperplasia   • Hypertension   • Shortness of breath   • Obesity, Class III, BMI 40-49.9 (morbid obesity) (Formerly [facility])       Allergies   Allergen Reactions   • Antihistamines, Diphenhydramine-Type Other (See Comments)     Bloody nose          Current Outpatient Medications:   •  buPROPion XL (WELLBUTRIN XL) 150 MG 24 hr tablet, Take 150 mg by mouth Daily. Take dos, Disp: , Rfl:   •  ferrous sulfate 325 (65 FE) MG tablet, Take 1 tablet by mouth Daily With Breakfast. Hold last dose , Disp: 90 tablet, Rfl: 3  •  Multiple Vitamins-Calcium (ONE-A-DAY WOMENS PO), Take 1 tablet by mouth Daily. Last dose , Disp: , Rfl:   •  omeprazole (priLOSEC) 40 MG capsule, TAKE 1 CAPSULE BY MOUTH EVERY DAY, Disp: 30 capsule, Rfl: 6    Social History     Socioeconomic History   • Marital status: Single   Tobacco Use   • Smoking status: Former Smoker     Quit date:      Years since quittin.8   • Smokeless tobacco: Never Used   Vaping Use   • Vaping Use: Never used   Substance and Sexual Activity   • Alcohol use: Not Currently     Comment: 2 drinks per week, social    • Drug use: No   • Sexual activity: Defer       Family History   Problem Relation Age of Onset   • Hypertension Mother    • Cancer Mother    • Skin cancer Mother    • Hypertension Father    • Heart attack Father    • Hypertension Sister    • Diabetes Sister    • Cancer Maternal Grandmother    • Lung cancer Maternal Grandmother    • Cancer Maternal Grandfather        Review of Systems:  Review of Systems   Constitutional: Negative.    HENT: Negative.    Eyes: Negative.    Respiratory: Negative.    Cardiovascular: Negative.    Gastrointestinal: Negative.    Endocrine: Negative.    Genitourinary: Negative.    Musculoskeletal: Negative.    Skin: Negative.    Allergic/Immunologic: Negative.    Neurological: Negative.    Hematological: Negative.    Psychiatric/Behavioral: Negative.          Physical Exam:    Vital Signs:  Weight: (!) 198 kg (435 lb 12.8 oz)   Body mass index is 68.26 kg/m².  Temp: 97.5 °F (36.4 °C)   Heart Rate: 73   BP: (!) 149/104       Physical Exam  Vitals reviewed.   Constitutional:       Appearance: She is well-developed.   HENT:      Head: Normocephalic and atraumatic.    Eyes:      Conjunctiva/sclera: Conjunctivae normal.      Pupils: Pupils are equal, round, and reactive to light.   Cardiovascular:      Rate and Rhythm: Normal rate and regular rhythm.      Heart sounds: Normal heart sounds.   Pulmonary:      Effort: Pulmonary effort is normal.      Breath sounds: Normal breath sounds.   Abdominal:      General: Bowel sounds are normal. There is no distension.      Palpations: Abdomen is soft. There is no mass.      Tenderness: There is no abdominal tenderness. There is no guarding or rebound.      Hernia: No hernia is present.   Musculoskeletal:         General: Normal range of motion.      Cervical back: Normal range of motion and neck supple.   Skin:     General: Skin is warm and dry.   Neurological:      Mental Status: She is alert and oriented to person, place, and time.           Assessment:    Kyle Pennington is a 41 y.o. year old female with medically complicated severe obesity with a BMI of Body mass index is 68.26 kg/m². and multiple co-morbidities listed in the encounter diagnosis.      She is actually regaining weight during her medically supervised weight loss.  She admits to stress eating.  I told her that I am very concerned about this and I am concerned she may not be successful in her surgery.  She has already failed with the LAP-BAND and I am concerned she may fail of the gastric sleeve as well.  I think possibility of doing a gastric bypass which is a very good antireflux procedure but at the same time you trade reflux for other risk such as marginal ulcer and vitamin deficiency protein deficiency and internal hernia.  I also am concerned about her ability to tolerate a gastric bypass lifestyle after surgery.  I worry about her ability to stay on vitamins and follow-up properly that is required for someone with gastric bypass.  I also told her that is not worth the risk of any surgery if she is not going to change her lifestyle.  Therefore I told her that  she must get her weight down 20 pounds or another words down to 415 pounds before we will do surgery and she may in fact be able to do that during her liver reduction diet.  If she is able to do this then I think she is an appropriate candidate for this surgery, and is ready to proceed.      The patient has returned to the office for a surgical consultation and has requested to proceed with a laparoscopic gastric sleeve.  I have had the opportunity to obtain a history, examine the patient and review the patient's chart.    The patient understands that surgery is a tool and that weight loss is not guaranteed but only seen in the context of appropriate use, regular follow up, exercise and making appropriate food choices.     I personally discussed the potential complications of the laparoscopic gastric sleeve with this patient.  The patient is well aware of potential complications of the surgery that include but not limited to bleeding, infections, deep vein thrombosis, pulmonary embolism, pulmonary complications such as pneumonia, cardiac event, hernias, small bowel obstruction, damage to the spleen or other organs, bowel injury, disfiguring scars, failure to lose weight, need for additional surgery, conversion to an open procedure and death.  The patient is also aware of complications which apply in particular to the gastric sleeve and can include but not limited to the leakage of gastric contents at the staple line, the development of an intra-abdominal abscess, gastroesophageal reflux disease, Lorenz's esophagus, ulcers, vitamin/mineral deficiencies, strictures, and the possibility of converting this procedure to a Lian-en-Y gastric bypass. The patient also understands the possibility of requiring an acid reducer medication for the rest of their life.    The risks, benefits, potential complications and alternative therapies were discussed at great length as outlined in our extensive consent forms, online consent  and educational teaching processes.    The patient has confirmed the participation in the programs extensive educational activities.    All questions and concerns were answered to patient's satisfaction.  The patient now wishes to proceed with surgery.    Patient has [default value] the pre-operative insertion of an IVC filter.     The patient has [default value] a postoperative course of anitcoagulant therapy.        Plan/Discussion/Summary:        I instructed patient to start on a H2 blocker or proton pump inhibitor if not already on one of these medications.    I explained in detail the procedures that we perform.  All of these procedures have a chance to convert to open if any technical challenges or complications do occur.  Bariatric surgery is not cosmetic surgery but rather a tool to help a patient make a life-long commitment lifestyle change including diet, exercise, behavior changes, and taking supplemental vitamins and minerals.    Problems after surgery may require more operations to correct them.    The risks, benefits, alternatives, and potential complications of all of the procedures were explained in detail including, but not limited to death, anesthesia and medication adverse effect, deep venous thrombosis, pulmonary embolism, trocar site/incisional hernia, wound infection, abdominal infection, bleeding, failure to lose weight, gain weight, a change in body image, metabolic complications with vitamin deficiences and anemia.    Weight loss expectations were discussed with the patient in detail. The weight loss operations most commonly performed are the sleeve gastrectomy and the Lian-en-Y gastric bypass. These operations result in weight losses up to approximately 25-35% of initial body weight 12 to 24 months after surgery with the gastric bypass usually the higher percent of weight loss but depends on patient using the tool.    For the gastric bypass and loop duodenal switch (LATONIA-S) the risks  include but not limited to the following early complications:  Anastomotic leak/peritonitis, Lian/Alimentary/biliopancreatic limb obstruction, severe & minor wound infection/seroma, and nausea/vomiting.  Late complications can include but are not limited to malnutrition, vitamin deficiencies, frequent loose stools,  stomal stenosis, marginal ulcer, bowel obstruction, intussusception, internal, and incisional hernia.    Regarding the gastric sleeve, there is less long-term outcome data and higher risk of dysphagia and reflux compared to a gastric bypass, as well as risk of internal visceral/organ injury, splenectomy, bleeding, infection, leak (which could require further intervention possible conversion to Lian-en-Y gastric bypass), stenosis and possibility of regaining weight.    Kyle was counseled regarding diagnostic results, instructions for management, risk factor reductions, prognosis, patient and family education, impressions, risks and benefits of treatment options and importance of compliance with treatment. Total face to face time of the encounter was over 45 minutes and over 30 minutes was spent counseling.     Jaquan Report   As part of this patient's treatment plan I am prescribing controlled substances. The patient has been made aware of appropriate use of such medications, including potential risk of somnolence, limited ability to drive and /or work safely, and potential for dependence or overdose. It has also been made clear that these medications are for use by this patient only, without concomitant use of alcohol or other substances unless prescribed.    Kyle has completed prescribing agreement detailing terms of continued prescribing of controlled substances, including monitoring JAQUAN reports, urine drug screening, and pill counts if necessary. Kyle is aware that inappropriate use will result in cessation of prescribing such medications.    JAQUAN report has been reviewed       History and physical exam exhibit continued safe and appropriate use of controlled substances.      Kyle understands the surgical procedures and the different surgical options that are available.  She understands the lifestyle changes that are required after surgery and has agreed to follow the guidelines outlined in the weight management program.  She also expressed understanding of the risks involved and had all of female questions answered and desires to proceed.      Lyn Gan MD  10/29/2021

## 2021-11-02 NOTE — PROGRESS NOTES
Hematology/Oncology Outpatient Follow Up    PATIENT NAME:Kyle Pennington  :1980  MRN: [id number]  PRIMARY CARE PHYSICIAN: Geeta Hathaway APRN  REFERRING PHYSICIAN: Geeta Hathaway APRN    Chief Complaint   Patient presents with   • Follow-up     Iron deficiency anemia        HISTORY OF PRESENT ILLNESS:     This is a 40-year-old female who has been referred secondary to anemia.  Patient has a history of having gastric sleeve surgery in .  At that time she received IV iron infusion.  Over the course of time she has become progressively anemic.  Over the past 6 months she has been on oral iron supplementation.     Review of her CBC from 2020, white count was 6, hemoglobin 11.5, MCV was 71 and platelets were 283.  Differentials were 60% neutrophils, 23% lymphocytes, there was no monocytosis, eosinophilia or basophilia.  Patient had a chemistry panel creatinine was 0.65 and liver function tests were normal.  Review of her CBC from 2020 white count was 6.8, hemoglobin 9.5 and platelets were 334.  2020 white count was 9.2, hemoglobin 10.3 and platelets with 391.  On 2020 patient had a EGD and colonoscopy done which showed hiatal hernia and transverse colonic polyp.  This was performed by Dr. Lara.     She also has a history of hematuria for which she was [facility]  She has had menorrhagia and is scheduled for partial hysterectomy on 2020 by Dr. Vasquez.     Patient has been referred due to anemia.  Complains of extreme tiredness    · 10/21/2020 patient had anemia labs done.  White count was 6.2, hemoglobin 11.8, MCV was low at 76 and platelets were 3 20,000.  Differential essentially unremarkable.  SPEP with [facility]TON did not show any evidence of monoclonal protein.  Serum ferritin was low at 12, B12 was low normal at 292 and folate was low normal at 6.  Reticulocyte count was normal at 1.4%.  · Patient is scheduled for partial hysterectomy to  be performed by Dr. Vasquez on 2020 but was denied by her insurance company.  She is now scheduled for D&C first week in January  · 2020 she had a methylmalonic acid level which was normal, urinalysis was negative  · 2020 patient had IV Injectafer and also on 2020  · 21: Endometrial curretage showed complex atypical hyperplasia but no radha malignancy was identified.  · 21: Ferritn was 27, wbc 10, hemglobin is 13 and platekets are 318      Past Medical History:   Diagnosis Date   • Anxiety and depression    • Arthritis    • Bipolar affective (HCC)    • Breast injury    • Edema leg    • Fibrocystic breast    • GERD (gastroesophageal reflux disease)    • Hypertension    • Morbid obesity (HCC)        Past Surgical History:   Procedure Laterality Date   • ABDOMINAL SURGERY     •  SECTION     • COLONOSCOPY N/A 2020    Procedure: COLONOSCOPY to cecum and TI with cold snare polypectomy;  Surgeon: Giovanni Lara MD;  Location: [facility] ENDOSCOPY;  Service: Gastroenterology;  Laterality: N/A;  pre-iron def anemia  post-polyp   • D & C CERVICAL BIOPSY     • D & C HYSTEROSCOPY N/A 2021    Procedure: DILATATION AND CURETTAGE HYSTEROSCOPY;  Surgeon: Power Vasquez MD;  Location: [facility] MAIN OR;  Service: Gynecology;  Laterality: N/A;   • ENDOSCOPY N/A 8/15/2019    Procedure: ESOPHAGOGASTRODUODENOSCOPY W/BIOPSY;  Surgeon: Lyn Gan MD;  Location: [facility] ENDOSCOPY;  Service: General   • ENDOSCOPY N/A 2020    Procedure: ESOPHAGOGASTRODUODENOSCOPY;  Surgeon: Giovanni Lara MD;  Location: [facility] ENDOSCOPY;  Service: Gastroenterology;  Laterality: N/A;  pre-iron def anemia  post-hiatal hernia   • GASTRIC BANDING REMOVAL N/A 10/17/2019    Procedure: GASTRIC BANDING REMOVAL LAPAROSCOPIC;  Surgeon: Lyn Gan MD;  Location: [facility] MAIN OR;  Service: General   • HYSTERECTOMY     • LAPAROSCOPIC GASTRIC BANDING     • LAPAROSCOPIC TUBAL LIGATION            Current Outpatient Medications:   •  buPROPion XL (WELLBUTRIN XL) 150 MG 24 hr tablet, Take 150 mg by mouth Daily. Take dos, Disp: , Rfl:   •  ferrous sulfate 325 (65 FE) MG tablet, Take 1 tablet by mouth Daily With Breakfast. Hold last dose , Disp: 90 tablet, Rfl: 3  •  Multiple Vitamins-Calcium (ONE-A-DAY WOMENS PO), Take 1 tablet by mouth Daily. Last dose , Disp: , Rfl:   •  omeprazole (priLOSEC) 40 MG capsule, TAKE 1 CAPSULE BY MOUTH EVERY DAY, Disp: 30 capsule, Rfl: 6    Allergies   Allergen Reactions   • Antihistamines, Diphenhydramine-Type Other (See Comments)     Bloody nose       Family History   Problem Relation Age of Onset   • Hypertension Mother    • Cancer Mother    • Skin cancer Mother    • Hypertension Father    • Heart attack Father    • Hypertension Sister    • Diabetes Sister    • Cancer Maternal Grandmother    • Lung cancer Maternal Grandmother    • Cancer Maternal Grandfather        Cancer-related family history includes Cancer in her maternal grandfather, maternal grandmother, and mother; Lung cancer in her maternal grandmother; Skin cancer in her mother.    Social History     Tobacco Use   • Smoking status: Former Smoker     Quit date: 2000     Years since quittin.8   • Smokeless tobacco: Never Used   Vaping Use   • Vaping Use: Never used   Substance Use Topics   • Alcohol use: Not Currently     Comment: 2 drinks per week, social    • Drug use: No       HPI, ROS and PFSH have been reviewed and confirmed on 11/3/2021.     SUBJECTIVE:    She has no specific complaints today.  Patient had a partial hysterectomy at [facility] in 2021    Patient is scheduled for gastric sleeve surgery 2021 by Dr. Gan        REVIEW OF SYSTEMS:  Review of Systems   Constitutional: Negative for chills and fever.   HENT: Negative for ear pain, mouth sores, nosebleeds and sore throat.    Eyes: Negative for photophobia and visual disturbance.   Respiratory: Negative  "for wheezing and stridor.    Cardiovascular: Negative for chest pain and palpitations.   Gastrointestinal: Negative for abdominal pain, diarrhea, nausea and vomiting.   Endocrine: Negative for cold intolerance and heat intolerance.   Genitourinary: Negative for dysuria and hematuria.   Musculoskeletal: Negative for joint swelling and neck stiffness.   Skin: Negative for color change and rash.   Neurological: Negative for seizures and syncope.   Hematological: Negative for adenopathy.        Increased menstrual flow   Psychiatric/Behavioral: Negative for agitation, confusion and hallucinations.     I have reviewed and confirmed the accuracy of the ROS as documented by the MA/LPN/RN Sisi Vane Gaviria MD    OBJECTIVE:    Vitals:    11/03/21 0929   BP: (!) 188/107   Pulse: 66   Resp: 18   Temp: 97.3 °F (36.3 °C)   TempSrc: Infrared   Height: 170.2 cm (67\")   PainSc: 0-No pain     Body mass index is 68.26 kg/m².    ECOG  (1) Restricted in physically strenuous activity, ambulatory and able to do work of light nature    Physical Exam  Constitutional:       Appearance: Normal appearance. She is not toxic-appearing or diaphoretic.   HENT:      Head: Normocephalic and atraumatic.      Right Ear: External ear normal.      Left Ear: External ear normal.      Nose: Nose normal.      Mouth/Throat:      Mouth: Mucous membranes are moist.   Eyes:      Extraocular Movements: Extraocular movements intact.      Conjunctiva/sclera: Conjunctivae normal.      Pupils: Pupils are equal, round, and reactive to light.   Pulmonary:      Effort: Pulmonary effort is normal.   Musculoskeletal:         General: Normal range of motion.   Neurological:      General: No focal deficit present.      Mental Status: She is alert and oriented to person, place, and time.   Psychiatric:         Mood and Affect: Mood normal.         Behavior: Behavior normal.         Thought Content: Thought content normal.         Judgment: Judgment normal.     I have " reexamined the patient and the results are consistent with the previously documented exam. Sisi Gaviria MD     RECENT LABS    WBC   Date Value Ref Range Status   02/12/2021 6.57 3.40 - 10.80 10*3/mm3 Final     RBC   Date Value Ref Range Status   02/12/2021 4.86 3.77 - 5.28 10*6/mm3 Final     Hemoglobin   Date Value Ref Range Status   02/12/2021 13.3 12.0 - 15.9 g/dL Final     Hematocrit   Date Value Ref Range Status   02/12/2021 41.1 34.0 - 46.6 % Final     MCV   Date Value Ref Range Status   02/12/2021 84.6 79.0 - 97.0 fL Final     MCH   Date Value Ref Range Status   02/12/2021 27.4 26.6 - 33.0 pg Final     MCHC   Date Value Ref Range Status   02/12/2021 32.4 31.5 - 35.7 g/dL Final     RDW   Date Value Ref Range Status   02/12/2021 14.0 12.3 - 15.4 % Final     RDW-SD   Date Value Ref Range Status   02/12/2021 43.0 37.0 - 54.0 fl Final     MPV   Date Value Ref Range Status   02/12/2021 10.7 6.0 - 12.0 fL Final     Platelets   Date Value Ref Range Status   02/12/2021 252 140 - 450 10*3/mm3 Final     Neutrophil %   Date Value Ref Range Status   02/12/2021 69.7 42.7 - 76.0 % Final     Lymphocyte %   Date Value Ref Range Status   02/12/2021 19.5 (L) 19.6 - 45.3 % Final     Monocyte %   Date Value Ref Range Status   02/12/2021 7.8 5.0 - 12.0 % Final     Eosinophil %   Date Value Ref Range Status   02/12/2021 2.0 0.3 - 6.2 % Final     Basophil %   Date Value Ref Range Status   02/12/2021 0.5 0.0 - 1.5 % Final     Immature Grans %   Date Value Ref Range Status   02/12/2021 0.5 0.0 - 0.5 % Final     Neutrophils, Absolute   Date Value Ref Range Status   02/12/2021 4.59 1.70 - 7.00 10*3/mm3 Final     Lymphocytes, Absolute   Date Value Ref Range Status   02/12/2021 1.28 0.70 - 3.10 10*3/mm3 Final     Monocytes, Absolute   Date Value Ref Range Status   02/12/2021 0.51 0.10 - 0.90 10*3/mm3 Final     Eosinophils, Absolute   Date Value Ref Range Status   02/12/2021 0.13 0.00 - 0.40 10*3/mm3 Final     Basophils, Absolute    Date Value Ref Range Status   02/12/2021 0.03 0.00 - 0.20 10*3/mm3 Final     Immature Grans, Absolute   Date Value Ref Range Status   02/12/2021 0.03 0.00 - 0.05 10*3/mm3 Final     nRBC   Date Value Ref Range Status   02/12/2021 0.0 0.0 - 0.2 /100 WBC Final       Lab Results   Component Value Date    GLUCOSE 118 (H) 02/12/2021    BUN 16 02/12/2021    CREATININE 0.65 02/12/2021    EGFRIFNONA 100 02/12/2021    BCR 24.6 02/12/2021    K 4.6 02/12/2021    CO2 32.4 (H) 02/12/2021    CALCIUM 9.6 02/12/2021    ALBUMIN 3.80 09/27/2020    AST 21 09/27/2020    ALT 28 09/27/2020         Assessment/Plan     There are no diagnoses linked to this encounter.    ASSESSMENT:    1. Microcytic anemia due to iron deficiency from menorrhagia, gastric sleeve surgery: Status post IV Injectafer 11/12/2020. Last ferritin was 27, hemglobin is 13g/dl> Will therefore continue oral iron supplementation for now.  Her hemoglobin has increased to 13 g per DL.  She has been asked to discontinue oral iron supplementation.  2. Mild folate deficiency: Replaced  3. Low normal B12 level but normal MMA  4. Status post partial hysterectomy 3/23/2021 for atypical endometrial hyperplasia at [facility].  Performed by Dr. Vasquez: Pathology was essentially benign.  No evidence of malignancy  5. Recent COVID-19 infection  6. Status post colonoscopy August 2020  7. Patient is scheduled for gastric sleeve surgery on 11/18/2021  8. History of hematuria: Seen by Dr. Ricks  9. Assessment has been reviewed and updated           Plans     · She will discontinue oral iron supplementation  · Follow-up in 4 months with CBC and iron studies done a few days before the appointment  · Proceed with gastric sleeve surgery to be performed by Dr. Gan on 11/18/2021  · Urinalysis was negative for hematuria  · Call earlier as needed for problems  · All questions answered        Thank you very much for allowing us to participate in the care of Kyle  I will keep you updated on her progress        Patient verbalized understanding and is in agreement of the above plan.

## 2021-11-03 ENCOUNTER — OFFICE VISIT (OUTPATIENT)
Dept: ONCOLOGY | Facility: CLINIC | Age: 41
End: 2021-11-03

## 2021-11-03 VITALS
RESPIRATION RATE: 18 BRPM | BODY MASS INDEX: 68.26 KG/M2 | TEMPERATURE: 97.3 F | HEART RATE: 66 BPM | HEIGHT: 67 IN | DIASTOLIC BLOOD PRESSURE: 107 MMHG | SYSTOLIC BLOOD PRESSURE: 188 MMHG

## 2021-11-03 DIAGNOSIS — D50.9 IRON DEFICIENCY ANEMIA, UNSPECIFIED IRON DEFICIENCY ANEMIA TYPE: Primary | ICD-10-CM

## 2021-11-03 DIAGNOSIS — D64.9 ANEMIA, UNSPECIFIED TYPE: ICD-10-CM

## 2021-11-03 PROCEDURE — 99213 OFFICE O/P EST LOW 20 MIN: CPT | Performed by: INTERNAL MEDICINE

## 2021-11-05 RX ORDER — URSODIOL 250 MG/1
250 TABLET, FILM COATED ORAL 2 TIMES DAILY
Qty: 180 TABLET | Refills: 1 | Status: SHIPPED | OUTPATIENT
Start: 2021-11-05

## 2021-11-08 ENCOUNTER — LAB (OUTPATIENT)
Dept: LAB | Facility: HOSPITAL | Age: 41
End: 2021-11-08

## 2021-11-08 ENCOUNTER — HOSPITAL ENCOUNTER (OUTPATIENT)
Dept: CARDIOLOGY | Facility: HOSPITAL | Age: 41
Discharge: HOME | End: 2021-11-08

## 2021-11-08 ENCOUNTER — TELEPHONE (OUTPATIENT)
Dept: BARIATRICS/WEIGHT MGMT | Facility: CLINIC | Age: 41
End: 2021-11-08

## 2021-11-08 DIAGNOSIS — E66.01 MORBID OBESITY WITH BMI OF 45.0-49.9, ADULT (HCC): ICD-10-CM

## 2021-11-08 LAB
ALBUMIN SERPL-MCNC: 4.4 G/DL (ref 3.5–5.2)
ALBUMIN/GLOB SERPL: 1.3 G/DL
ALP SERPL-CCNC: 103 U/L (ref 39–117)
ALT SERPL W P-5'-P-CCNC: 43 U/L (ref 1–33)
ANION GAP SERPL CALCULATED.3IONS-SCNC: 10.7 MMOL/L (ref 5–15)
AST SERPL-CCNC: 36 U/L (ref 1–32)
BILIRUB SERPL-MCNC: 0.4 MG/DL (ref 0–1.2)
BUN SERPL-MCNC: 13 MG/DL (ref 6–20)
BUN/CREAT SERPL: 19.7 (ref 7–25)
CALCIUM SPEC-SCNC: 10.4 MG/DL (ref 8.6–10.5)
CHLORIDE SERPL-SCNC: 102 MMOL/L (ref 98–107)
CO2 SERPL-SCNC: 29.3 MMOL/L (ref 22–29)
CREAT SERPL-MCNC: 0.66 MG/DL (ref 0.57–1)
DEPRECATED RDW RBC AUTO: 42.9 FL (ref 37–54)
ERYTHROCYTE [DISTWIDTH] IN BLOOD BY AUTOMATED COUNT: 13.7 % (ref 12.3–15.4)
GFR SERPL CREATININE-BSD FRML MDRD: 98 ML/MIN/1.73
GLOBULIN UR ELPH-MCNC: 3.4 GM/DL
GLUCOSE SERPL-MCNC: 98 MG/DL (ref 65–99)
HCT VFR BLD AUTO: 46 % (ref 34–46.6)
HGB BLD-MCNC: 14.7 G/DL (ref 12–15.9)
MCH RBC QN AUTO: 26.9 PG (ref 26.6–33)
MCHC RBC AUTO-ENTMCNC: 32 G/DL (ref 31.5–35.7)
MCV RBC AUTO: 84.1 FL (ref 79–97)
PLATELET # BLD AUTO: 314 10*3/MM3 (ref 140–450)
PMV BLD AUTO: 11.2 FL (ref 6–12)
POTASSIUM SERPL-SCNC: 4.5 MMOL/L (ref 3.5–5.2)
PROT SERPL-MCNC: 7.8 G/DL (ref 6–8.5)
QT INTERVAL: 422 MS
RBC # BLD AUTO: 5.47 10*6/MM3 (ref 3.77–5.28)
SODIUM SERPL-SCNC: 142 MMOL/L (ref 136–145)
WBC # BLD AUTO: 8.5 10*3/MM3 (ref 3.4–10.8)

## 2021-11-08 PROCEDURE — 36415 COLL VENOUS BLD VENIPUNCTURE: CPT

## 2021-11-08 PROCEDURE — 80053 COMPREHEN METABOLIC PANEL: CPT

## 2021-11-08 PROCEDURE — 85027 COMPLETE CBC AUTOMATED: CPT

## 2021-11-08 PROCEDURE — 93010 ELECTROCARDIOGRAM REPORT: CPT | Performed by: INTERNAL MEDICINE

## 2021-11-08 PROCEDURE — 93005 ELECTROCARDIOGRAM TRACING: CPT | Performed by: SURGERY

## 2021-11-08 RX ORDER — NEBIVOLOL 10 MG/1
10 TABLET ORAL EVERY MORNING
COMMUNITY

## 2021-11-08 NOTE — TELEPHONE ENCOUNTER
Pt called to find out if ok to continue w surgery since she has only been taking her ursodiol since 11/7 when the pharmacy had it available to . I advsd ok but to continue to take meds as instructed. She verbally understood.

## 2021-11-11 DIAGNOSIS — K21.9 GASTROESOPHAGEAL REFLUX DISEASE WITHOUT ESOPHAGITIS: Primary | ICD-10-CM

## 2021-11-11 RX ORDER — OMEPRAZOLE 40 MG/1
40 CAPSULE, DELAYED RELEASE ORAL DAILY
Qty: 30 CAPSULE | Refills: 6 | Status: SHIPPED | OUTPATIENT
Start: 2021-11-11 | End: 2022-01-05 | Stop reason: SDUPTHER

## 2021-11-13 ENCOUNTER — LAB (OUTPATIENT)
Dept: LAB | Facility: HOSPITAL | Age: 41
End: 2021-11-13

## 2021-11-13 DIAGNOSIS — E66.01 MORBID OBESITY WITH BMI OF 45.0-49.9, ADULT (HCC): ICD-10-CM

## 2021-11-13 PROCEDURE — U0004 COV-19 TEST NON-CDC HGH THRU: HCPCS

## 2021-11-13 PROCEDURE — C9803 HOPD COVID-19 SPEC COLLECT: HCPCS

## 2021-11-14 LAB — SARS-COV-2 ORF1AB RESP QL NAA+PROBE: DETECTED

## 2021-11-15 ENCOUNTER — TELEPHONE (OUTPATIENT)
Dept: BARIATRICS/WEIGHT MGMT | Facility: CLINIC | Age: 41
End: 2021-11-15

## 2021-11-23 ENCOUNTER — TELEPHONE (OUTPATIENT)
Dept: BARIATRICS/WEIGHT MGMT | Facility: CLINIC | Age: 41
End: 2021-11-23

## 2021-11-23 DIAGNOSIS — Z01.818 PRE-OP TESTING: Primary | ICD-10-CM

## 2021-11-23 NOTE — TELEPHONE ENCOUNTER
Having stomach pain since starting LRD.  Nuzhat tested covid+ 11/10 and has boughts of diarrhea & constipation.  Patient is using mixed shakes of Bariatric Advantage and Premiere protein so I asked patient to onl use one product until tomorrow to see if that makes a difference in the way that her stomach feels.  She will reach out tomorrow.

## 2021-11-24 ENCOUNTER — LAB (OUTPATIENT)
Dept: LAB | Facility: HOSPITAL | Age: 41
End: 2021-11-24

## 2021-11-24 DIAGNOSIS — Z01.818 PRE-OP TESTING: ICD-10-CM

## 2021-11-24 LAB
ABO GROUP BLD: NORMAL
ANION GAP SERPL CALCULATED.3IONS-SCNC: 10 MMOL/L (ref 5–15)
BLD GP AB SCN SERPL QL: NEGATIVE
BUN SERPL-MCNC: 14 MG/DL (ref 6–20)
BUN/CREAT SERPL: 17.5 (ref 7–25)
CALCIUM SPEC-SCNC: 10.2 MG/DL (ref 8.6–10.5)
CHLORIDE SERPL-SCNC: 94 MMOL/L (ref 98–107)
CO2 SERPL-SCNC: 34 MMOL/L (ref 22–29)
CREAT SERPL-MCNC: 0.8 MG/DL (ref 0.57–1)
GFR SERPL CREATININE-BSD FRML MDRD: 79 ML/MIN/1.73
GLUCOSE SERPL-MCNC: 134 MG/DL (ref 65–99)
POTASSIUM SERPL-SCNC: 3.4 MMOL/L (ref 3.5–5.2)
RH BLD: POSITIVE
SODIUM SERPL-SCNC: 138 MMOL/L (ref 136–145)
T&S EXPIRATION DATE: NORMAL

## 2021-11-24 PROCEDURE — 86850 RBC ANTIBODY SCREEN: CPT

## 2021-11-24 PROCEDURE — 86901 BLOOD TYPING SEROLOGIC RH(D): CPT

## 2021-11-24 PROCEDURE — 86900 BLOOD TYPING SEROLOGIC ABO: CPT

## 2021-11-24 PROCEDURE — 80048 BASIC METABOLIC PNL TOTAL CA: CPT

## 2021-11-24 PROCEDURE — 36415 COLL VENOUS BLD VENIPUNCTURE: CPT

## 2021-12-01 ENCOUNTER — ANESTHESIA EVENT (OUTPATIENT)
Dept: PERIOP | Facility: HOSPITAL | Age: 41
End: 2021-12-01

## 2021-12-02 ENCOUNTER — HOSPITAL ENCOUNTER (INPATIENT)
Facility: HOSPITAL | Age: 41
LOS: 1 days | Discharge: HOME | End: 2021-12-03
Attending: SURGERY | Admitting: SURGERY

## 2021-12-02 ENCOUNTER — ANESTHESIA (OUTPATIENT)
Dept: PERIOP | Facility: HOSPITAL | Age: 41
End: 2021-12-02

## 2021-12-02 ENCOUNTER — APPOINTMENT (OUTPATIENT)
Dept: GENERAL RADIOLOGY | Facility: HOSPITAL | Age: 41
End: 2021-12-02

## 2021-12-02 DIAGNOSIS — E66.01 OBESITY, CLASS III, BMI 40-49.9 (MORBID OBESITY) (HCC): Primary | ICD-10-CM

## 2021-12-02 DIAGNOSIS — E66.01 MORBID OBESITY WITH BMI OF 45.0-49.9, ADULT (HCC): ICD-10-CM

## 2021-12-02 LAB
B-HCG UR QL: NEGATIVE
POTASSIUM SERPL-SCNC: 3.2 MMOL/L (ref 3.5–5.2)

## 2021-12-02 PROCEDURE — C1889 IMPLANT/INSERT DEVICE, NOC: HCPCS | Performed by: SURGERY

## 2021-12-02 PROCEDURE — 25010000002 ONDANSETRON PER 1 MG: Performed by: ANESTHESIOLOGY

## 2021-12-02 PROCEDURE — 63710000001 PROMETHAZINE PER 12.5 MG: Performed by: SURGERY

## 2021-12-02 PROCEDURE — 43775 LAP SLEEVE GASTRECTOMY: CPT | Performed by: REGISTERED NURSE

## 2021-12-02 PROCEDURE — 25010000002 MIDAZOLAM PER 1 MG: Performed by: ANESTHESIOLOGY

## 2021-12-02 PROCEDURE — 94799 UNLISTED PULMONARY SVC/PX: CPT

## 2021-12-02 PROCEDURE — 88307 TISSUE EXAM BY PATHOLOGIST: CPT | Performed by: SURGERY

## 2021-12-02 PROCEDURE — C9290 INJ, BUPIVACAINE LIPOSOME: HCPCS | Performed by: SURGERY

## 2021-12-02 PROCEDURE — 84132 ASSAY OF SERUM POTASSIUM: CPT | Performed by: SURGERY

## 2021-12-02 PROCEDURE — 94660 CPAP INITIATION&MGMT: CPT

## 2021-12-02 PROCEDURE — 43775 LAP SLEEVE GASTRECTOMY: CPT | Performed by: SURGERY

## 2021-12-02 PROCEDURE — 0DB64Z3 EXCISION OF STOMACH, PERCUTANEOUS ENDOSCOPIC APPROACH, VERTICAL: ICD-10-PCS | Performed by: SURGERY

## 2021-12-02 PROCEDURE — 25010000002 FENTANYL CITRATE (PF) 100 MCG/2ML SOLUTION: Performed by: ANESTHESIOLOGY

## 2021-12-02 PROCEDURE — 81025 URINE PREGNANCY TEST: CPT | Performed by: SURGERY

## 2021-12-02 PROCEDURE — 0FN04ZZ RELEASE LIVER, PERCUTANEOUS ENDOSCOPIC APPROACH: ICD-10-PCS | Performed by: SURGERY

## 2021-12-02 PROCEDURE — 25010000002 DEXAMETHASONE PER 1 MG: Performed by: ANESTHESIOLOGY

## 2021-12-02 PROCEDURE — 0DN64ZZ RELEASE STOMACH, PERCUTANEOUS ENDOSCOPIC APPROACH: ICD-10-PCS | Performed by: SURGERY

## 2021-12-02 PROCEDURE — S0260 H&P FOR SURGERY: HCPCS | Performed by: SURGERY

## 2021-12-02 PROCEDURE — 8E0W4CZ ROBOTIC ASSISTED PROCEDURE OF TRUNK REGION, PERCUTANEOUS ENDOSCOPIC APPROACH: ICD-10-PCS | Performed by: SURGERY

## 2021-12-02 PROCEDURE — 76000 FLUOROSCOPY <1 HR PHYS/QHP: CPT

## 2021-12-02 PROCEDURE — 0 BUPIVACAINE LIPOSOME 1.3 % SUSPENSION: Performed by: SURGERY

## 2021-12-02 PROCEDURE — 25010000002 PROPOFOL 1000 MG/100ML EMULSION: Performed by: ANESTHESIOLOGY

## 2021-12-02 PROCEDURE — 25010000002 HYDROMORPHONE PER 4 MG: Performed by: ANESTHESIOLOGY

## 2021-12-02 PROCEDURE — 0BQT4ZZ REPAIR DIAPHRAGM, PERCUTANEOUS ENDOSCOPIC APPROACH: ICD-10-PCS | Performed by: SURGERY

## 2021-12-02 PROCEDURE — 0DNU4ZZ RELEASE OMENTUM, PERCUTANEOUS ENDOSCOPIC APPROACH: ICD-10-PCS | Performed by: SURGERY

## 2021-12-02 PROCEDURE — 25010000002 CEFAZOLIN PER 500 MG

## 2021-12-02 DEVICE — SEAL HEMO SURG ARISTA/AH ABS/PWDR 3GM: Type: IMPLANTABLE DEVICE | Site: ABDOMEN | Status: FUNCTIONAL

## 2021-12-02 DEVICE — STAPLER 60 RELOAD BLUE
Type: IMPLANTABLE DEVICE | Site: ABDOMEN | Status: FUNCTIONAL
Brand: SUREFORM

## 2021-12-02 DEVICE — STAPLER 60 RELOAD WHITE
Type: IMPLANTABLE DEVICE | Site: ABDOMEN | Status: FUNCTIONAL
Brand: SUREFORM

## 2021-12-02 RX ORDER — HYDROMORPHONE HCL 110MG/55ML
0.5 PATIENT CONTROLLED ANALGESIA SYRINGE INTRAVENOUS
Status: DISCONTINUED | OUTPATIENT
Start: 2021-12-02 | End: 2021-12-02 | Stop reason: HOSPADM

## 2021-12-02 RX ORDER — ACETAMINOPHEN 325 MG/1
650 TABLET ORAL ONCE AS NEEDED
Status: DISCONTINUED | OUTPATIENT
Start: 2021-12-02 | End: 2021-12-02 | Stop reason: HOSPADM

## 2021-12-02 RX ORDER — SODIUM CHLORIDE, SODIUM LACTATE, POTASSIUM CHLORIDE, CALCIUM CHLORIDE 600; 310; 30; 20 MG/100ML; MG/100ML; MG/100ML; MG/100ML
150 INJECTION, SOLUTION INTRAVENOUS CONTINUOUS
Status: DISCONTINUED | OUTPATIENT
Start: 2021-12-02 | End: 2021-12-03

## 2021-12-02 RX ORDER — ONDANSETRON 2 MG/ML
INJECTION INTRAMUSCULAR; INTRAVENOUS AS NEEDED
Status: DISCONTINUED | OUTPATIENT
Start: 2021-12-02 | End: 2021-12-02 | Stop reason: SURG

## 2021-12-02 RX ORDER — PROCHLORPERAZINE EDISYLATE 5 MG/ML
10 INJECTION INTRAMUSCULAR; INTRAVENOUS EVERY 6 HOURS PRN
Status: DISCONTINUED | OUTPATIENT
Start: 2021-12-02 | End: 2021-12-03 | Stop reason: HOSPADM

## 2021-12-02 RX ORDER — ONDANSETRON 2 MG/ML
8 INJECTION INTRAMUSCULAR; INTRAVENOUS EVERY 6 HOURS PRN
Status: DISCONTINUED | OUTPATIENT
Start: 2021-12-02 | End: 2021-12-03 | Stop reason: HOSPADM

## 2021-12-02 RX ORDER — ONDANSETRON 2 MG/ML
4 INJECTION INTRAMUSCULAR; INTRAVENOUS ONCE AS NEEDED
Status: DISCONTINUED | OUTPATIENT
Start: 2021-12-02 | End: 2021-12-02 | Stop reason: HOSPADM

## 2021-12-02 RX ORDER — GABAPENTIN 250 MG/5ML
300 SOLUTION ORAL ONCE
Status: DISCONTINUED | OUTPATIENT
Start: 2021-12-02 | End: 2021-12-02 | Stop reason: CLARIF

## 2021-12-02 RX ORDER — ACETAMINOPHEN 650 MG/1
650 SUPPOSITORY RECTAL ONCE AS NEEDED
Status: DISCONTINUED | OUTPATIENT
Start: 2021-12-02 | End: 2021-12-02 | Stop reason: HOSPADM

## 2021-12-02 RX ORDER — SCOLOPAMINE TRANSDERMAL SYSTEM 1 MG/1
1 PATCH, EXTENDED RELEASE TRANSDERMAL ONCE
Status: DISCONTINUED | OUTPATIENT
Start: 2021-12-02 | End: 2021-12-02

## 2021-12-02 RX ORDER — CHLORTHALIDONE 25 MG/1
25 TABLET ORAL DAILY
COMMUNITY
Start: 2021-11-12

## 2021-12-02 RX ORDER — ACETAMINOPHEN 160 MG/5ML
650 SOLUTION ORAL EVERY 6 HOURS
Status: DISCONTINUED | OUTPATIENT
Start: 2021-12-02 | End: 2021-12-03 | Stop reason: HOSPADM

## 2021-12-02 RX ORDER — PANTOPRAZOLE SODIUM 40 MG/10ML
40 INJECTION, POWDER, LYOPHILIZED, FOR SOLUTION INTRAVENOUS ONCE
Status: COMPLETED | OUTPATIENT
Start: 2021-12-02 | End: 2021-12-02

## 2021-12-02 RX ORDER — SODIUM CHLORIDE 0.9 % (FLUSH) 0.9 %
3-10 SYRINGE (ML) INJECTION AS NEEDED
Status: DISCONTINUED | OUTPATIENT
Start: 2021-12-02 | End: 2021-12-02 | Stop reason: HOSPADM

## 2021-12-02 RX ORDER — MIDAZOLAM HYDROCHLORIDE 1 MG/ML
INJECTION INTRAMUSCULAR; INTRAVENOUS AS NEEDED
Status: DISCONTINUED | OUTPATIENT
Start: 2021-12-02 | End: 2021-12-02 | Stop reason: SURG

## 2021-12-02 RX ORDER — CHLORHEXIDINE GLUCONATE 0.12 MG/ML
15 RINSE ORAL SEE ADMIN INSTRUCTIONS
Status: COMPLETED | OUTPATIENT
Start: 2021-12-02 | End: 2021-12-02

## 2021-12-02 RX ORDER — NALOXONE HCL 0.4 MG/ML
0.1 VIAL (ML) INJECTION
Status: DISCONTINUED | OUTPATIENT
Start: 2021-12-02 | End: 2021-12-03 | Stop reason: HOSPADM

## 2021-12-02 RX ORDER — GABAPENTIN 300 MG/1
300 CAPSULE ORAL ONCE
Status: COMPLETED | OUTPATIENT
Start: 2021-12-02 | End: 2021-12-02

## 2021-12-02 RX ORDER — FENTANYL CITRATE 50 UG/ML
INJECTION, SOLUTION INTRAMUSCULAR; INTRAVENOUS AS NEEDED
Status: DISCONTINUED | OUTPATIENT
Start: 2021-12-02 | End: 2021-12-02 | Stop reason: SURG

## 2021-12-02 RX ORDER — GABAPENTIN 250 MG/5ML
300 SOLUTION ORAL EVERY 8 HOURS SCHEDULED
Status: DISCONTINUED | OUTPATIENT
Start: 2021-12-02 | End: 2021-12-02 | Stop reason: ALTCHOICE

## 2021-12-02 RX ORDER — PROMETHAZINE HYDROCHLORIDE 25 MG/1
25 SUPPOSITORY RECTAL ONCE
Status: COMPLETED | OUTPATIENT
Start: 2021-12-02 | End: 2021-12-02

## 2021-12-02 RX ORDER — NEBIVOLOL 10 MG/1
20 TABLET ORAL DAILY
Status: DISCONTINUED | OUTPATIENT
Start: 2021-12-03 | End: 2021-12-03

## 2021-12-02 RX ORDER — BUPIVACAINE HYDROCHLORIDE 2.5 MG/ML
INJECTION, SOLUTION EPIDURAL; INFILTRATION; INTRACAUDAL AS NEEDED
Status: DISCONTINUED | OUTPATIENT
Start: 2021-12-02 | End: 2021-12-02 | Stop reason: HOSPADM

## 2021-12-02 RX ORDER — DEXAMETHASONE SODIUM PHOSPHATE 4 MG/ML
INJECTION, SOLUTION INTRA-ARTICULAR; INTRALESIONAL; INTRAMUSCULAR; INTRAVENOUS; SOFT TISSUE AS NEEDED
Status: DISCONTINUED | OUTPATIENT
Start: 2021-12-02 | End: 2021-12-02 | Stop reason: SURG

## 2021-12-02 RX ORDER — LIDOCAINE HYDROCHLORIDE 10 MG/ML
INJECTION, SOLUTION EPIDURAL; INFILTRATION; INTRACAUDAL; PERINEURAL AS NEEDED
Status: DISCONTINUED | OUTPATIENT
Start: 2021-12-02 | End: 2021-12-02 | Stop reason: SURG

## 2021-12-02 RX ORDER — ACETAMINOPHEN 325 MG/1
650 TABLET ORAL EVERY 6 HOURS
Status: DISCONTINUED | OUTPATIENT
Start: 2021-12-02 | End: 2021-12-03 | Stop reason: HOSPADM

## 2021-12-02 RX ORDER — MEPERIDINE HYDROCHLORIDE 25 MG/ML
12.5 INJECTION INTRAMUSCULAR; INTRAVENOUS; SUBCUTANEOUS
Status: DISCONTINUED | OUTPATIENT
Start: 2021-12-02 | End: 2021-12-02 | Stop reason: HOSPADM

## 2021-12-02 RX ORDER — FENTANYL CITRATE 50 UG/ML
50 INJECTION, SOLUTION INTRAMUSCULAR; INTRAVENOUS
Status: DISCONTINUED | OUTPATIENT
Start: 2021-12-02 | End: 2021-12-02 | Stop reason: HOSPADM

## 2021-12-02 RX ORDER — ONDANSETRON 4 MG/1
8 TABLET, FILM COATED ORAL EVERY 6 HOURS PRN
Status: DISCONTINUED | OUTPATIENT
Start: 2021-12-02 | End: 2021-12-03 | Stop reason: HOSPADM

## 2021-12-02 RX ORDER — CEFAZOLIN SODIUM IN 0.9 % NACL 3 G/100 ML
3 INTRAVENOUS SOLUTION, PIGGYBACK (ML) INTRAVENOUS
Status: DISCONTINUED | OUTPATIENT
Start: 2021-12-02 | End: 2021-12-02 | Stop reason: HOSPADM

## 2021-12-02 RX ORDER — SPIRONOLACTONE 25 MG/1
1 TABLET ORAL DAILY
COMMUNITY
Start: 2021-11-29

## 2021-12-02 RX ORDER — BUPROPION HYDROCHLORIDE 150 MG/1
150 TABLET ORAL DAILY
Status: DISCONTINUED | OUTPATIENT
Start: 2021-12-03 | End: 2021-12-03 | Stop reason: HOSPADM

## 2021-12-02 RX ORDER — GABAPENTIN 300 MG/1
300 CAPSULE ORAL EVERY 8 HOURS SCHEDULED
Status: DISCONTINUED | OUTPATIENT
Start: 2021-12-02 | End: 2021-12-03 | Stop reason: HOSPADM

## 2021-12-02 RX ORDER — ROCURONIUM BROMIDE 10 MG/ML
INJECTION, SOLUTION INTRAVENOUS AS NEEDED
Status: DISCONTINUED | OUTPATIENT
Start: 2021-12-02 | End: 2021-12-02 | Stop reason: SURG

## 2021-12-02 RX ORDER — METOCLOPRAMIDE HYDROCHLORIDE 5 MG/ML
10 INJECTION INTRAMUSCULAR; INTRAVENOUS EVERY 6 HOURS PRN
Status: DISCONTINUED | OUTPATIENT
Start: 2021-12-02 | End: 2021-12-03 | Stop reason: HOSPADM

## 2021-12-02 RX ORDER — PROMETHAZINE HYDROCHLORIDE 12.5 MG/1
12.5 TABLET ORAL ONCE
Status: COMPLETED | OUTPATIENT
Start: 2021-12-02 | End: 2021-12-02

## 2021-12-02 RX ORDER — CEFAZOLIN SODIUM IN 0.9 % NACL 3 G/100 ML
INTRAVENOUS SOLUTION, PIGGYBACK (ML) INTRAVENOUS AS NEEDED
Status: DISCONTINUED | OUTPATIENT
Start: 2021-12-02 | End: 2021-12-02 | Stop reason: SURG

## 2021-12-02 RX ORDER — ACETAMINOPHEN 650 MG/1
650 SUPPOSITORY RECTAL EVERY 6 HOURS
Status: DISCONTINUED | OUTPATIENT
Start: 2021-12-02 | End: 2021-12-03 | Stop reason: HOSPADM

## 2021-12-02 RX ORDER — HYDROMORPHONE HCL 110MG/55ML
0.5 PATIENT CONTROLLED ANALGESIA SYRINGE INTRAVENOUS
Status: DISCONTINUED | OUTPATIENT
Start: 2021-12-02 | End: 2021-12-03 | Stop reason: HOSPADM

## 2021-12-02 RX ORDER — HYDRALAZINE HYDROCHLORIDE 20 MG/ML
10 INJECTION INTRAMUSCULAR; INTRAVENOUS
Status: DISCONTINUED | OUTPATIENT
Start: 2021-12-02 | End: 2021-12-03 | Stop reason: HOSPADM

## 2021-12-02 RX ORDER — PROPOFOL 10 MG/ML
INJECTION, EMULSION INTRAVENOUS AS NEEDED
Status: DISCONTINUED | OUTPATIENT
Start: 2021-12-02 | End: 2021-12-02 | Stop reason: SURG

## 2021-12-02 RX ORDER — FAMOTIDINE 10 MG/ML
20 INJECTION, SOLUTION INTRAVENOUS EVERY 12 HOURS SCHEDULED
Status: DISCONTINUED | OUTPATIENT
Start: 2021-12-02 | End: 2021-12-03 | Stop reason: HOSPADM

## 2021-12-02 RX ORDER — SODIUM CHLORIDE, SODIUM LACTATE, POTASSIUM CHLORIDE, CALCIUM CHLORIDE 600; 310; 30; 20 MG/100ML; MG/100ML; MG/100ML; MG/100ML
75 INJECTION, SOLUTION INTRAVENOUS CONTINUOUS
Status: DISCONTINUED | OUTPATIENT
Start: 2021-12-02 | End: 2021-12-03 | Stop reason: HOSPADM

## 2021-12-02 RX ORDER — CYANOCOBALAMIN 1000 UG/ML
1000 INJECTION, SOLUTION INTRAMUSCULAR; SUBCUTANEOUS ONCE
Status: COMPLETED | OUTPATIENT
Start: 2021-12-03 | End: 2021-12-03

## 2021-12-02 RX ADMIN — SUGAMMADEX 200 MG: 100 INJECTION, SOLUTION INTRAVENOUS at 19:16

## 2021-12-02 RX ADMIN — PROPOFOL 200 MG: 10 INJECTION, EMULSION INTRAVENOUS at 16:24

## 2021-12-02 RX ADMIN — PROPOFOL 90 MCG/KG/MIN: 10 INJECTION, EMULSION INTRAVENOUS at 18:15

## 2021-12-02 RX ADMIN — MIDAZOLAM 2 MG: 1 INJECTION INTRAMUSCULAR; INTRAVENOUS at 16:23

## 2021-12-02 RX ADMIN — FENTANYL CITRATE 100 MCG: 50 INJECTION, SOLUTION INTRAMUSCULAR; INTRAVENOUS at 16:23

## 2021-12-02 RX ADMIN — GABAPENTIN 300 MG: 300 CAPSULE ORAL at 13:13

## 2021-12-02 RX ADMIN — SCOPALAMINE 1 PATCH: 1 PATCH, EXTENDED RELEASE TRANSDERMAL at 13:13

## 2021-12-02 RX ADMIN — LIDOCAINE HYDROCHLORIDE 50 MG: 10 INJECTION, SOLUTION EPIDURAL; INFILTRATION; INTRACAUDAL; PERINEURAL at 16:23

## 2021-12-02 RX ADMIN — CHLORHEXIDINE GLUCONATE 15 ML: 1.2 SOLUTION ORAL at 13:13

## 2021-12-02 RX ADMIN — PROPOFOL 90 MCG/KG/MIN: 10 INJECTION, EMULSION INTRAVENOUS at 17:16

## 2021-12-02 RX ADMIN — ONDANSETRON 4 MG: 2 INJECTION INTRAMUSCULAR; INTRAVENOUS at 18:45

## 2021-12-02 RX ADMIN — ACETAMINOPHEN 650 MG: 325 TABLET, FILM COATED ORAL at 23:58

## 2021-12-02 RX ADMIN — FAMOTIDINE 20 MG: 10 INJECTION, SOLUTION INTRAVENOUS at 23:58

## 2021-12-02 RX ADMIN — SODIUM CHLORIDE, SODIUM LACTATE, POTASSIUM CHLORIDE, AND CALCIUM CHLORIDE: .6; .31; .03; .02 INJECTION, SOLUTION INTRAVENOUS at 18:03

## 2021-12-02 RX ADMIN — FENTANYL CITRATE 100 MCG: 50 INJECTION, SOLUTION INTRAMUSCULAR; INTRAVENOUS at 18:10

## 2021-12-02 RX ADMIN — SODIUM CHLORIDE, POTASSIUM CHLORIDE, SODIUM LACTATE AND CALCIUM CHLORIDE 500 ML: 600; 310; 30; 20 INJECTION, SOLUTION INTRAVENOUS at 13:07

## 2021-12-02 RX ADMIN — PANTOPRAZOLE SODIUM 40 MG: 40 INJECTION, POWDER, FOR SOLUTION INTRAVENOUS at 13:13

## 2021-12-02 RX ADMIN — FENTANYL CITRATE 100 MCG: 50 INJECTION, SOLUTION INTRAMUSCULAR; INTRAVENOUS at 19:35

## 2021-12-02 RX ADMIN — HYDROMORPHONE HYDROCHLORIDE 1 MG: 2 INJECTION, SOLUTION INTRAMUSCULAR; INTRAVENOUS; SUBCUTANEOUS at 20:05

## 2021-12-02 RX ADMIN — SODIUM CHLORIDE, SODIUM LACTATE, POTASSIUM CHLORIDE, AND CALCIUM CHLORIDE: .6; .31; .03; .02 INJECTION, SOLUTION INTRAVENOUS at 16:26

## 2021-12-02 RX ADMIN — PROMETHAZINE HYDROCHLORIDE 12.5 MG: 12.5 TABLET ORAL at 13:13

## 2021-12-02 RX ADMIN — PROPOFOL 120 MCG/KG/MIN: 10 INJECTION, EMULSION INTRAVENOUS at 16:23

## 2021-12-02 RX ADMIN — DEXAMETHASONE SODIUM PHOSPHATE 8 MG: 4 INJECTION, SOLUTION INTRAMUSCULAR; INTRAVENOUS at 16:30

## 2021-12-02 RX ADMIN — Medication 3 G: at 16:30

## 2021-12-02 RX ADMIN — GABAPENTIN 300 MG: 300 CAPSULE ORAL at 23:58

## 2021-12-02 RX ADMIN — ROCURONIUM BROMIDE 50 MG: 10 INJECTION INTRAVENOUS at 16:23

## 2021-12-02 RX ADMIN — ROCURONIUM BROMIDE 50 MG: 10 INJECTION INTRAVENOUS at 17:12

## 2021-12-02 NOTE — ANESTHESIA PREPROCEDURE EVALUATION
Anesthesia Evaluation     Patient summary reviewed and Nursing notes reviewed   no history of anesthetic complications:  NPO Solid Status: > 8 hours  NPO Liquid Status: > 2 hours           Airway   Dental      Pulmonary    (+) a smoker Former, shortness of breath,   Cardiovascular     ECG reviewed  Patient on routine beta blocker    (+) hypertension,       Neuro/Psych  (+) psychiatric history Anxiety, Depression and Bipolar,     GI/Hepatic/Renal/Endo    (+) morbid obesity, GERD,      Musculoskeletal     Abdominal    Substance History - negative use     OB/GYN negative ob/gyn ROS         Other   arthritis,      ROS/Med Hx Other: Hypokalemia, edema, iron malabsorption    H/O COVID-19    Echocardiogram Findings    Left Ventricle Left ventricular ejection fraction appears to be 61 - 65%.  Right Ventricle The right ventricular cavity is mildly dilated.  Left Atrium The left atrial cavity is moderately dilated.  Right Atrium Normal right atrial cavity size noted.  Aortic Valve The aortic valve is grossly normal in structure.  Mitral Valve The mitral valve is grossly normal in structure.  Tricuspid Valve Mild tricuspid valve regurgitation is present.  Pulmonic Valve The pulmonic valve is not well visualized.  Greater Vessels No dilation of the aortic root is present.  Pericardium There is no evidence of pericardial effusion. .    PSH  LAPAROSCOPIC TUBAL LIGATION ABDOMINAL SURGERY  LAPAROSCOPIC GASTRIC BANDING ENDOSCOPY  GASTRIC BANDING REMOVAL ENDOSCOPY  COLONOSCOPY  SECTION  D & C HYSTEROSCOPY HYSTERECTOMY  D & C CERVICAL BIOPSY                 Anesthesia Plan    ASA 4     general   total IV anesthesia(Patient identified; pre-operative vital signs, all relevant labs/studies, complete medical/surgical/anesthetic history, full medication list, full allergy list, and NPO status obtained/reviewed; physical assessment performed; anesthetic options, side effects, potential complications, risks, and benefits discussed;  questions answered; written anesthesia consent obtained; patient cleared for procedure; anesthesia machine and equipment checked and functioning    ERAS)  intravenous induction     Anesthetic plan, all risks, benefits, and alternatives have been provided, discussed and informed consent has been obtained with: patient.    Plan discussed with CRNA and CAA.

## 2021-12-02 NOTE — ANESTHESIA PROCEDURE NOTES
Airway  Urgency: elective    Date/Time: 12/2/2021 4:28 PM  Airway not difficult    General Information and Staff    Patient location during procedure: OR  Anesthesiologist: Shawna Reddy MD    Indications and Patient Condition  Indications for airway management: airway protection    Preoxygenated: yes  MILS maintained throughout  Mask difficulty assessment: 1 - vent by mask    Final Airway Details  Final airway type: endotracheal airway      Successful airway: ETT  Cuffed: yes   Successful intubation technique: direct laryngoscopy  Facilitating devices/methods: cricoid pressure and intubating stylet  Endotracheal tube insertion site: oral  Blade: Carmen  Blade size: 3  ETT size (mm): 7.0  Cormack-Lehane Classification: grade I - full view of glottis  Placement verified by: chest auscultation and capnometry   Measured from: lips  ETT/EBT  to lips (cm): 22  Number of attempts at approach: 1  Assessment: lips, teeth, and gum same as pre-op and atraumatic intubation

## 2021-12-02 NOTE — H&P
Bariatric Consult:  Referred by Geeta Hathaway APRN     Kyle Pennington is here today for consult on Consult (Pre Op sleeve 11/16)        History of Present Illness:        Kyle Pennington is a 41 y.o. female with morbid obesity with co-morbidities including GERD, hypertension who presents for surgical consultation for the above procedure. Kyle has completed the initial intake visit and has been examined by our nurse practitioner, dietician, psychologist and underwent the extensive educational teaching process under the guidance of our bariatric coordinator and myself. Kyle also has seen the educational video MOLLY on the surgical procedure if available. Kyle attended today more educational teaching from our bariatric coordinator and myself. Kyle has had an extensive medical workup including a visit with their primary care physician, EKG, chest radiograph, blood work, EGD or UGI and possibly further testing. These have been reviewed by me and discussed with the patient. Kyle is now ready to proceed with surgery. Kyle presently denies nausea, vomiting, fever, chills, chest pain, shortness of air, melena, hematochezia, hemetemesis, dysuria, frequency, hematuria, jaundice or abdominal pain.      Wt Readings from Last 1 Encounters:   12/02/21 (!) 181 kg (398 lb 12.8 oz)           Wt Readings from Last 10 Encounters:   10/29/21 (!) 198 kg (435 lb 12.8 oz)   04/21/21 (!) 184 kg (406 lb)   03/10/21 (!) 184 kg (406 lb)   02/26/21 (!) 184 kg (406 lb 8 oz)   02/18/21 (!) 182 kg (401 lb 3.8 oz)   01/04/21 (!) 174 kg (382 lb 11.5 oz)   11/19/20 (!) 171 kg (376 lb)   11/12/20 (!) 170 kg (375 lb)   10/09/20 (!) 161 kg (354 lb 3.2 oz)   09/29/20 (!) 161 kg (354 lb 6.4 oz)         Her pre-op EGD shows small hiatal hernia     Medical History        Past Medical History:   Diagnosis Date   • Anxiety and depression     • Arthritis     • Bipolar affective (HCC)     • Breast injury     • Edema leg      • Fibrocystic breast     • GERD (gastroesophageal reflux disease)     • Hypertension     • Morbid obesity (HCC)              No diagnosis found.     Surgical History         Past Surgical History:   Procedure Laterality Date   • ABDOMINAL SURGERY       •  SECTION      • COLONOSCOPY N/A 2020     Procedure: COLONOSCOPY to cecum and TI with cold snare polypectomy;  Surgeon: Giovanni Lara MD;  Location: [facility] ENDOSCOPY;  Service: Gastroenterology;  Laterality: N/A;  pre-iron def anemia  post-polyp   • D & C CERVICAL BIOPSY      • D & C HYSTEROSCOPY N/A 2021     Procedure: DILATATION AND CURETTAGE HYSTEROSCOPY;  Surgeon: Power Vasquez MD;  Location: [facility] MAIN OR;  Service: Gynecology;  Laterality: N/A;   • ENDOSCOPY N/A 8/15/2019     Procedure: ESOPHAGOGASTRODUODENOSCOPY W/BIOPSY;  Surgeon: Lyn Gan MD;  Location: [facility] ENDOSCOPY;  Service: General   • ENDOSCOPY N/A 2020     Procedure: ESOPHAGOGASTRODUODENOSCOPY;  Surgeon: Giovanni Lara MD;  Location: [facility] ENDOSCOPY;  Service: Gastroenterology;  Laterality: N/A;  pre-iron def anemia  post-hiatal hernia   • GASTRIC BANDING REMOVAL N/A 10/17/2019     Procedure: GASTRIC BANDING REMOVAL LAPAROSCOPIC;  Surgeon: Lyn Gan MD;  Location: [facility] MAIN OR;  Service: General   • HYSTERECTOMY      • LAPAROSCOPIC GASTRIC BANDING       • LAPAROSCOPIC TUBAL LIGATION                    Patient Active Problem List   Diagnosis   • Morbid obesity (HCC)   • Advanced maternal age (AMA) in pregnancy   • Chronic hypertension in pregnancy   • History of laparoscopic adjustable gastric banding   • Twin pregnancy in second trimester   • BMI 50.0-59.9, adult (HCC)   • Pre-operative examination   • GERD (gastroesophageal reflux disease)   • Iron deficiency anemia   • Iron malabsorption   • Anxiety   • Vaginal bleeding   • Anemia   • Depressive disorder   • Edema   • Endometrial hyperplasia   • Hypertension   • Shortness of  breath   • Obesity, Class III, BMI 40-49.9 (morbid obesity) (Formerly [facility])               Allergies   Allergen Reactions   • Antihistamines, Diphenhydramine-Type Other (See Comments)       Bloody nose            Current Outpatient Medications:   •  buPROPion XL (WELLBUTRIN XL) 150 MG 24 hr tablet, Take 150 mg by mouth Daily. Take dos, Disp: , Rfl:   •  ferrous sulfate 325 (65 FE) MG tablet, Take 1 tablet by mouth Daily With Breakfast. Hold last dose , Disp: 90 tablet, Rfl: 3  •  Multiple Vitamins-Calcium (ONE-A-DAY WOMENS PO), Take 1 tablet by mouth Daily. Last dose , Disp: , Rfl:   •  omeprazole (priLOSEC) 40 MG capsule, TAKE 1 CAPSULE BY MOUTH EVERY DAY, Disp: 30 capsule, Rfl: 6     Social History   Social History            Socioeconomic History   • Marital status: Single   Tobacco Use   • Smoking status: Former Smoker       Quit date:        Years since quittin.8   • Smokeless tobacco: Never Used   Vaping Use   • Vaping Use: Never used   Substance and Sexual Activity   • Alcohol use: Not Currently       Comment: 2 drinks per week, social    • Drug use: No   • Sexual activity: Defer                  Family History   Problem Relation Age of Onset   • Hypertension Mother     • Cancer Mother     • Skin cancer Mother     • Hypertension Father     • Heart attack Father     • Hypertension Sister     • Diabetes Sister     • Cancer Maternal Grandmother     • Lung cancer Maternal Grandmother     • Cancer Maternal Grandfather           Review of Systems:  Review of Systems   Constitutional: Negative.    HENT: Negative.    Eyes: Negative.    Respiratory: Negative.    Cardiovascular: Negative.    Gastrointestinal: Negative.    Endocrine: Negative.    Genitourinary: Negative.    Musculoskeletal: Negative.    Skin: Negative.    Allergic/Immunologic: Negative.    Neurological: Negative.    Hematological: Negative.    Psychiatric/Behavioral: Negative.             Physical Exam:     Vital Signs:  Weight: (!) 198 kg (435 lb  12.8 oz)   Body mass index is 68.26 kg/m².  Temp: 97.5 °F (36.4 °C)   Heart Rate: 73   BP: (!) 149/104         Physical Exam  Vitals reviewed.   Constitutional:       Appearance: She is well-developed.   HENT:      Head: Normocephalic and atraumatic.   Eyes:      Conjunctiva/sclera: Conjunctivae normal.      Pupils: Pupils are equal, round, and reactive to light.   Cardiovascular:      Rate and Rhythm: Normal rate and regular rhythm.      Heart sounds: Normal heart sounds.   Pulmonary:      Effort: Pulmonary effort is normal.      Breath sounds: Normal breath sounds.   Abdominal:      General: Bowel sounds are normal. There is no distension.      Palpations: Abdomen is soft. There is no mass.      Tenderness: There is no abdominal tenderness. There is no guarding or rebound.      Hernia: No hernia is present.   Musculoskeletal:         General: Normal range of motion.      Cervical back: Normal range of motion and neck supple.   Skin:     General: Skin is warm and dry.   Neurological:      Mental Status: She is alert and oriented to person, place, and time.             Assessment:     Kyle Pennington is a 41 y.o. year old female with medically complicated severe obesity with a BMI of Body mass index is 68.26 kg/m². and multiple co-morbidities listed in the encounter diagnosis.        She is actually regaining weight during her medically supervised weight loss.  She admits to stress eating.  I told her that I am very concerned about this and I am concerned she may not be successful in her surgery.  She has already failed with the LAP-BAND and I am concerned she may fail of the gastric sleeve as well.  I think possibility of doing a gastric bypass which is a very good antireflux procedure but at the same time you trade reflux for other risk such as marginal ulcer and vitamin deficiency protein deficiency and internal hernia.  I also am concerned about her ability to tolerate a gastric bypass lifestyle after  surgery.  I worry about her ability to stay on vitamins and follow-up properly that is required for someone with gastric bypass.  I also told her that is not worth the risk of any surgery if she is not going to change her lifestyle.  Therefore I told her that she must get her weight down 20 pounds or another words down to 415 pounds before we will do surgery and she may in fact be able to do that during her liver reduction diet.  If she is able to do this then I think she is an appropriate candidate for this surgery, and is ready to proceed.       The patient has returned to the office for a surgical consultation and has requested to proceed with a laparoscopic gastric sleeve.  I have had the opportunity to obtain a history, examine the patient and review the patient's chart.    The patient understands that surgery is a tool and that weight loss is not guaranteed but only seen in the context of appropriate use, regular follow up, exercise and making appropriate food choices.     I personally discussed the potential complications of the laparoscopic gastric sleeve with this patient.  The patient is well aware of potential complications of the surgery that include but not limited to bleeding, infections, deep vein thrombosis, pulmonary embolism, pulmonary complications such as pneumonia, cardiac event, hernias, small bowel obstruction, damage to the spleen or other organs, bowel injury, disfiguring scars, failure to lose weight, need for additional surgery, conversion to an open procedure and death.  The patient is also aware of complications which apply in particular to the gastric sleeve and can include but not limited to the leakage of gastric contents at the staple line, the development of an intra-abdominal abscess, gastroesophageal reflux disease, Lorenz's esophagus, ulcers, vitamin/mineral deficiencies, strictures, and the possibility of converting this procedure to a Lian-en-Y gastric bypass. The patient also  understands the possibility of requiring an acid reducer medication for the rest of their life.    The risks, benefits, potential complications and alternative therapies were discussed at great length as outlined in our extensive consent forms, online consent and educational teaching processes.    The patient has confirmed the participation in the programs extensive educational activities.    All questions and concerns were answered to patient's satisfaction.  The patient now wishes to proceed with surgery.    Patient has [default value] the pre-operative insertion of an IVC filter.             The patient has [default value] a postoperative course of anitcoagulant therapy.           Plan/Discussion/Summary:           I instructed patient to start on a H2 blocker or proton pump inhibitor if not already on one of these medications.     I explained in detail the procedures that we perform.  All of these procedures have a chance to convert to open if any technical challenges or complications do occur.  Bariatric surgery is not cosmetic surgery but rather a tool to help a patient make a life-long commitment lifestyle change including diet, exercise, behavior changes, and taking supplemental vitamins and minerals.     Problems after surgery may require more operations to correct them.     The risks, benefits, alternatives, and potential complications of all of the procedures were explained in detail including, but not limited to death, anesthesia and medication adverse effect, deep venous thrombosis, pulmonary embolism, trocar site/incisional hernia, wound infection, abdominal infection, bleeding, failure to lose weight, gain weight, a change in body image, metabolic complications with vitamin deficiences and anemia.     Weight loss expectations were discussed with the patient in detail. The weight loss operations most commonly performed are the sleeve gastrectomy and the Lian-en-Y gastric bypass. These operations result  in weight losses up to approximately 25-35% of initial body weight 12 to 24 months after surgery with the gastric bypass usually the higher percent of weight loss but depends on patient using the tool.     For the gastric bypass and loop duodenal switch (LATONIA-S) the risks include but not limited to the following early complications:  Anastomotic leak/peritonitis, Lian/Alimentary/biliopancreatic limb obstruction, severe & minor wound infection/seroma, and nausea/vomiting.  Late complications can include but are not limited to malnutrition, vitamin deficiencies, frequent loose stools,  stomal stenosis, marginal ulcer, bowel obstruction, intussusception, internal, and incisional hernia.     Regarding the gastric sleeve, there is less long-term outcome data and higher risk of dysphagia and reflux compared to a gastric bypass, as well as risk of internal visceral/organ injury, splenectomy, bleeding, infection, leak (which could require further intervention possible conversion to Lian-en-Y gastric bypass), stenosis and possibility of regaining weight.     Kyle was counseled regarding diagnostic results, instructions for management, risk factor reductions, prognosis, patient and family education, impressions, risks and benefits of treatment options and importance of compliance with treatment. Total face to face time of the encounter was over 45 minutes and over 30 minutes was spent counseling.      Jorge Luis Alvarez   As part of this patient's treatment plan I am prescribing controlled substances. The patient has been made aware of appropriate use of such medications, including potential risk of somnolence, limited ability to drive and /or work safely, and potential for dependence or overdose. It has also been made clear that these medications are for use by this patient only, without concomitant use of alcohol or other substances unless prescribed.    Kyle has completed prescribing agreement detailing terms of  continued prescribing of controlled substances, including monitoring JAQUAN reports, urine drug screening, and pill counts if necessary. Kyle is aware that inappropriate use will result in cessation of prescribing such medications.    JAQUAN report has been reviewed      History and physical exam exhibit continued safe and appropriate use of controlled substances.       Kyle understands the surgical procedures and the different surgical options that are available.  She understands the lifestyle changes that are required after surgery and has agreed to follow the guidelines outlined in the weight management program.  She also expressed understanding of the risks involved and had all of female questions answered and desires to proceed.

## 2021-12-03 ENCOUNTER — READMISSION MANAGEMENT (OUTPATIENT)
Dept: CALL CENTER | Facility: HOSPITAL | Age: 41
End: 2021-12-03

## 2021-12-03 VITALS
HEIGHT: 66 IN | BODY MASS INDEX: 47.09 KG/M2 | OXYGEN SATURATION: 93 % | RESPIRATION RATE: 16 BRPM | DIASTOLIC BLOOD PRESSURE: 68 MMHG | HEART RATE: 55 BPM | TEMPERATURE: 97.3 F | WEIGHT: 293 LBS | SYSTOLIC BLOOD PRESSURE: 114 MMHG

## 2021-12-03 LAB
ALBUMIN SERPL-MCNC: 3.9 G/DL (ref 3.5–5.2)
ALBUMIN/GLOB SERPL: 1.2 G/DL
ALP SERPL-CCNC: 117 U/L (ref 39–117)
ALT SERPL W P-5'-P-CCNC: 70 U/L (ref 1–33)
ANION GAP SERPL CALCULATED.3IONS-SCNC: 16 MMOL/L (ref 5–15)
AST SERPL-CCNC: 51 U/L (ref 1–32)
BASOPHILS # BLD AUTO: 0 10*3/MM3 (ref 0–0.2)
BASOPHILS NFR BLD AUTO: 0.1 % (ref 0–1.5)
BILIRUB SERPL-MCNC: 0.5 MG/DL (ref 0–1.2)
BUN SERPL-MCNC: 11 MG/DL (ref 6–20)
BUN/CREAT SERPL: 14.5 (ref 7–25)
CALCIUM SPEC-SCNC: 9 MG/DL (ref 8.6–10.5)
CHLORIDE SERPL-SCNC: 96 MMOL/L (ref 98–107)
CO2 SERPL-SCNC: 24 MMOL/L (ref 22–29)
CREAT SERPL-MCNC: 0.76 MG/DL (ref 0.57–1)
DEPRECATED RDW RBC AUTO: 44.6 FL (ref 37–54)
EOSINOPHIL # BLD AUTO: 0 10*3/MM3 (ref 0–0.4)
EOSINOPHIL NFR BLD AUTO: 0 % (ref 0.3–6.2)
ERYTHROCYTE [DISTWIDTH] IN BLOOD BY AUTOMATED COUNT: 15.3 % (ref 12.3–15.4)
GFR SERPL CREATININE-BSD FRML MDRD: 83 ML/MIN/1.73
GLOBULIN UR ELPH-MCNC: 3.2 GM/DL
GLUCOSE SERPL-MCNC: 185 MG/DL (ref 65–99)
HCT VFR BLD AUTO: 42 % (ref 34–46.6)
HGB BLD-MCNC: 14.3 G/DL (ref 12–15.9)
LYMPHOCYTES # BLD AUTO: 0.4 10*3/MM3 (ref 0.7–3.1)
LYMPHOCYTES NFR BLD AUTO: 4.6 % (ref 19.6–45.3)
MAGNESIUM SERPL-MCNC: 2 MG/DL (ref 1.6–2.6)
MCH RBC QN AUTO: 28.6 PG (ref 26.6–33)
MCHC RBC AUTO-ENTMCNC: 34 G/DL (ref 31.5–35.7)
MCV RBC AUTO: 83.9 FL (ref 79–97)
MONOCYTES # BLD AUTO: 0.3 10*3/MM3 (ref 0.1–0.9)
MONOCYTES NFR BLD AUTO: 3 % (ref 5–12)
NEUTROPHILS NFR BLD AUTO: 8.3 10*3/MM3 (ref 1.7–7)
NEUTROPHILS NFR BLD AUTO: 92.3 % (ref 42.7–76)
NRBC BLD AUTO-RTO: 0.1 /100 WBC (ref 0–0.2)
PHOSPHATE SERPL-MCNC: 3.3 MG/DL (ref 2.5–4.5)
PLATELET # BLD AUTO: 232 10*3/MM3 (ref 140–450)
PMV BLD AUTO: 8.8 FL (ref 6–12)
POTASSIUM SERPL-SCNC: 3.2 MMOL/L (ref 3.5–5.2)
POTASSIUM SERPL-SCNC: 3.8 MMOL/L (ref 3.5–5.2)
PROT SERPL-MCNC: 7.1 G/DL (ref 6–8.5)
RBC # BLD AUTO: 5 10*6/MM3 (ref 3.77–5.28)
SODIUM SERPL-SCNC: 136 MMOL/L (ref 136–145)
WBC NRBC COR # BLD: 8.9 10*3/MM3 (ref 3.4–10.8)

## 2021-12-03 PROCEDURE — 25010000002 THIAMINE PER 100 MG: Performed by: SURGERY

## 2021-12-03 PROCEDURE — 83735 ASSAY OF MAGNESIUM: CPT | Performed by: SURGERY

## 2021-12-03 PROCEDURE — 84100 ASSAY OF PHOSPHORUS: CPT | Performed by: SURGERY

## 2021-12-03 PROCEDURE — 80053 COMPREHEN METABOLIC PANEL: CPT | Performed by: SURGERY

## 2021-12-03 PROCEDURE — 99024 POSTOP FOLLOW-UP VISIT: CPT | Performed by: SURGERY

## 2021-12-03 PROCEDURE — 85025 COMPLETE CBC W/AUTO DIFF WBC: CPT | Performed by: SURGERY

## 2021-12-03 PROCEDURE — 84132 ASSAY OF SERUM POTASSIUM: CPT | Performed by: SURGERY

## 2021-12-03 PROCEDURE — 0 POTASSIUM CHLORIDE 10 MEQ/100ML SOLUTION: Performed by: SURGERY

## 2021-12-03 PROCEDURE — 94799 UNLISTED PULMONARY SVC/PX: CPT

## 2021-12-03 PROCEDURE — 25010000002 ENOXAPARIN PER 10 MG: Performed by: SURGERY

## 2021-12-03 PROCEDURE — 25010000002 CYANOCOBALAMIN PER 1000 MCG: Performed by: SURGERY

## 2021-12-03 PROCEDURE — 94660 CPAP INITIATION&MGMT: CPT

## 2021-12-03 PROCEDURE — 94618 PULMONARY STRESS TESTING: CPT

## 2021-12-03 RX ORDER — NEBIVOLOL 10 MG/1
10 TABLET ORAL DAILY
Status: DISCONTINUED | OUTPATIENT
Start: 2021-12-04 | End: 2021-12-03 | Stop reason: HOSPADM

## 2021-12-03 RX ORDER — HYDROMORPHONE HYDROCHLORIDE 2 MG/1
2 TABLET ORAL EVERY 6 HOURS PRN
Qty: 18 TABLET | Refills: 0 | Status: SHIPPED | OUTPATIENT
Start: 2021-12-03

## 2021-12-03 RX ORDER — HYDROMORPHONE HYDROCHLORIDE 2 MG/1
2 TABLET ORAL
Status: DISCONTINUED | OUTPATIENT
Start: 2021-12-03 | End: 2021-12-03 | Stop reason: HOSPADM

## 2021-12-03 RX ORDER — ONDANSETRON 8 MG/1
8 TABLET, ORALLY DISINTEGRATING ORAL EVERY 8 HOURS PRN
Qty: 30 TABLET | Refills: 5 | Status: SHIPPED | OUTPATIENT
Start: 2021-12-03

## 2021-12-03 RX ORDER — KETOROLAC TROMETHAMINE 30 MG/ML
30 INJECTION, SOLUTION INTRAMUSCULAR; INTRAVENOUS EVERY 6 HOURS PRN
Status: DISCONTINUED | OUTPATIENT
Start: 2021-12-03 | End: 2021-12-03 | Stop reason: HOSPADM

## 2021-12-03 RX ORDER — POTASSIUM CHLORIDE 7.45 MG/ML
10 INJECTION INTRAVENOUS
Status: DISCONTINUED | OUTPATIENT
Start: 2021-12-03 | End: 2021-12-03 | Stop reason: HOSPADM

## 2021-12-03 RX ADMIN — THIAMINE HYDROCHLORIDE 100 ML/HR: 100 INJECTION, SOLUTION INTRAMUSCULAR; INTRAVENOUS at 00:20

## 2021-12-03 RX ADMIN — POTASSIUM CHLORIDE 10 MEQ: 7.46 INJECTION, SOLUTION INTRAVENOUS at 09:32

## 2021-12-03 RX ADMIN — FAMOTIDINE 20 MG: 10 INJECTION, SOLUTION INTRAVENOUS at 09:16

## 2021-12-03 RX ADMIN — ACETAMINOPHEN 650 MG: 325 TABLET, FILM COATED ORAL at 16:58

## 2021-12-03 RX ADMIN — ACETAMINOPHEN 650 MG: 325 TABLET, FILM COATED ORAL at 05:24

## 2021-12-03 RX ADMIN — POTASSIUM CHLORIDE 10 MEQ: 7.46 INJECTION, SOLUTION INTRAVENOUS at 06:13

## 2021-12-03 RX ADMIN — CYANOCOBALAMIN 1000 MCG: 1000 INJECTION, SOLUTION INTRAMUSCULAR at 07:26

## 2021-12-03 RX ADMIN — SODIUM CHLORIDE, POTASSIUM CHLORIDE, SODIUM LACTATE AND CALCIUM CHLORIDE 150 ML/HR: 600; 310; 30; 20 INJECTION, SOLUTION INTRAVENOUS at 00:04

## 2021-12-03 RX ADMIN — NEBIVOLOL HYDROCHLORIDE 20 MG: 10 TABLET ORAL at 09:16

## 2021-12-03 RX ADMIN — BUPROPION HYDROCHLORIDE 150 MG: 150 TABLET, EXTENDED RELEASE ORAL at 09:16

## 2021-12-03 RX ADMIN — ACETAMINOPHEN 650 MG: 325 TABLET, FILM COATED ORAL at 11:54

## 2021-12-03 RX ADMIN — GABAPENTIN 300 MG: 300 CAPSULE ORAL at 05:24

## 2021-12-03 RX ADMIN — ENOXAPARIN SODIUM 40 MG: 40 INJECTION SUBCUTANEOUS at 09:15

## 2021-12-03 RX ADMIN — POTASSIUM CHLORIDE 10 MEQ: 7.46 INJECTION, SOLUTION INTRAVENOUS at 11:54

## 2021-12-03 RX ADMIN — POTASSIUM CHLORIDE 10 MEQ: 7.46 INJECTION, SOLUTION INTRAVENOUS at 07:26

## 2021-12-03 RX ADMIN — GABAPENTIN 300 MG: 300 CAPSULE ORAL at 14:14

## 2021-12-03 NOTE — DISCHARGE PLACEMENT REQUEST
"Kyle Jonas (41 y.o. Female)             Date of Birth Social Security Number Address Home Phone MRN    [id number]  [id number] [address]  [place] [address] [phone] [id number]    Yarsani Marital Status             Tenriism Single       Admission Date Admission Type Admitting Provider Attending Provider Department, Room/Bed    12/2/21 Elective Lyn Gan MD Gore, Lanny, MD [facility] SURGICAL INPATIENT, 4112/1    Discharge Date Discharge Disposition Discharge Destination                         Attending Provider: Lyn Gan MD    Allergies: Antihistamines, Diphenhydramine-type    Isolation: None   Infection: COVID (History) (12/02/21)   Code Status: CPR   Advance Care Planning Activity    Ht: 167.6 cm (66\")   Wt: 181 kg (398 lb 12.8 oz)    Admission Cmt: None   Principal Problem: Obesity, Class III, BMI 40-49.9 (morbid obesity) (Formerly [facility]) [E66.01]                 Active Insurance as of 12/2/2021     Primary Coverage     Payor Plan Insurance Group Employer/Plan Group    [employer] [id number]     Payor Plan Address Payor Plan Phone Number Payor Plan Fax Number Effective Dates    MAIL STOP:   12/1/2017 - None Entered    [address] [address]       [facility] [address]       Subscriber Name Subscriber Birth Date Member ID       KYLE JONAS 1980 [id number]                 Emergency Contacts      (Rel.) Home Phone Work Phone Mobile Phone    DONTA FAM (Mother) [phone] -- [phone]    ETHAN BAUTISTA (Sister) -- -- [phone]    JOSE Fam (Father) -- -- [phone]            "

## 2021-12-03 NOTE — PROGRESS NOTES
"Subjective:       Kyle Pennington  is post op day one status post procedure listed. Patient denies shortness of air and lower extremity pain. Feels better than yesterday. No vomiting this am. Ambulating well and using incentive spirometer.       Objective:        /68 (BP Location: Right arm, Patient Position: Lying)   Pulse 78   Temp 97.8 °F (36.6 °C) (Axillary)   Resp 17   Ht 167.6 cm (66\")   Wt (!) 181 kg (398 lb 12.8 oz)   LMP 02/15/2021 (Exact Date)   SpO2 93%   BMI 64.37 kg/m²     General:  alert, appears stated age and cooperative   Abdomen: soft, bowel sounds active, appropriate tenderness   Incision:   healing well, no drainage, no erythema, no hernia, no seroma, no swelling, no dehiscence, incision well approximated   Heart: Regular rate   Lungs: Clear to auscultation bilaterally     I reviewed the patient's new clinical results.     Lab Results (last 24 hours)     Procedure Component Value Units Date/Time    Comprehensive Metabolic Panel [[id number]]  (Abnormal) Collected: 12/03/21 0252    Specimen: Blood Updated: 12/03/21 0349     Glucose 185 mg/dL      BUN 11 mg/dL      Creatinine 0.76 mg/dL      Sodium 136 mmol/L      Potassium 3.2 mmol/L      Chloride 96 mmol/L      CO2 24.0 mmol/L      Calcium 9.0 mg/dL      Total Protein 7.1 g/dL      Albumin 3.90 g/dL      ALT (SGPT) 70 U/L      AST (SGOT) 51 U/L      Alkaline Phosphatase 117 U/L      Total Bilirubin 0.5 mg/dL      eGFR Non African Amer 83 mL/min/1.73      Globulin 3.2 gm/dL      A/G Ratio 1.2 g/dL      BUN/Creatinine Ratio 14.5     Anion Gap 16.0 mmol/L     Narrative:      GFR Normal >60  Chronic Kidney Disease <60  Kidney Failure <15      Phosphorus [[id number]]  (Normal) Collected: 12/03/21 0252    Specimen: Blood Updated: 12/03/21 0349     Phosphorus 3.3 mg/dL     Magnesium [[id number]]  (Normal) Collected: 12/03/21 0252    Specimen: Blood Updated: 12/03/21 0349     Magnesium 2.0 mg/dL     CBC & Differential [[id number]]  " (Abnormal) Collected: 12/03/21 0252    Specimen: Blood Updated: 12/03/21 0330    Narrative:      The following orders were created for panel order CBC & Differential.  Procedure                               Abnormality         Status                     ---------                               -----------         ------                     CBC Auto Differential[[id number]]        Abnormal            Final result                 Please view results for these tests on the individual orders.    CBC Auto Differential [[id number]]  (Abnormal) Collected: 12/03/21 0252    Specimen: Blood Updated: 12/03/21 0330     WBC 8.90 10*3/mm3      RBC 5.00 10*6/mm3      Hemoglobin 14.3 g/dL      Hematocrit 42.0 %      MCV 83.9 fL      MCH 28.6 pg      MCHC 34.0 g/dL      RDW 15.3 %      RDW-SD 44.6 fl      MPV 8.8 fL      Platelets 232 10*3/mm3      Neutrophil % 92.3 %      Lymphocyte % 4.6 %      Monocyte % 3.0 %      Eosinophil % 0.0 %      Basophil % 0.1 %      Neutrophils, Absolute 8.30 10*3/mm3      Lymphocytes, Absolute 0.40 10*3/mm3      Monocytes, Absolute 0.30 10*3/mm3      Eosinophils, Absolute 0.00 10*3/mm3      Basophils, Absolute 0.00 10*3/mm3      nRBC 0.1 /100 WBC     Pregnancy, Urine - Urine, Clean Catch [[id number]]  (Normal) Collected: 12/02/21 1447    Specimen: Urine, Clean Catch Updated: 12/02/21 1504     HCG, Urine QL Negative    Potassium [[id number]]  (Abnormal) Collected: 12/02/21 1238    Specimen: Blood Updated: 12/02/21 1302     Potassium 3.2 mmol/L              Assessment:      Doing well postoperatively.      Plan:   1. Start diet  2. Continue with ambulation and Incentive spirometry  3. Plan for d/c home tomorrow due to low O2 sats.  She saw a pulmonologist in [facility] Dr. Lund and prior to surgery and was told that a CPAP was ordered however the medical supply company called [employer] in [facility] apparently has no order yet.  I will see if my office can call them today.    4. Lovenox 40  bid

## 2021-12-03 NOTE — DISCHARGE INSTR - DIET
Follow the Gastric Stage 1 Diet    à Clear liquids, room temperature, sugar-free, caffeine-free, non-carbonated, 70 grams of protein, No Straws.

## 2021-12-03 NOTE — DISCHARGE INSTRUCTIONS
GOING HOME AFTER GASTRIC SLEEVE/ GASTRIC BYPASS SURGERY  [facility] Weight Loss: Post-Operative Information/Instructions  Lyn Gan MD  General Patient Instructions for Discharge   - Call Surgeon's office at [phone] for follow-up appointment.    - Be sure you, the patient, have a follow-up appointment to be seen within seven (7) days after discharge. If not, please call [phone] to schedule an appointment. If you are discharged on a Saturday or Sunday, please call Monday to schedule the appointment.  - Contact the Surgeon at [phone] for any questions or concerns, including temperature greater than or equal to 101F, shortness of breath, leg swelling, redness at incision sites, nausea, vomiting, chills, or problems or questions.    - Follow the Gastric Stage 1 Diet    à Clear liquids, room temperature, sugar-free, caffeine-free, non-carbonated, 70 grams of protein, No Straws.  - You may shower. No tub bath for 2 weeks.  - No lifting, pushing, pulling, or tugging >25 pounds for 3 weeks.  - Ambulate every 3 hours while awake minimum for seven (7) days, increase distance daily.  - For the next several weeks, you are at an increased risk for blood clot formation. Therefore, you should walk regularly. You should not sit for prolonged periods of time, more than 45 minutes, without getting up and walking for 5-10 minutes. This includes any car rides, including the drive home from the hospital. If driving any distance greater than 30 miles over the next two (2) weeks, stop every 30-45 minutes and walk for 5-10 minutes each time.  - Continue using Incentive Spirometer and coughing exercises at least every two (2) hours while awake for one week.  - Continue use of CPAP/BIPAP for diagnosis of sleep apnea as directed.  - No driving or operating machinery allowed while taking narcotic (prescription) pain medication, and until you feel comfortable forcefully applying the brakes if needed. (This usually  takes more than 3 days.)    - Make an appointment with your Primary Care Physician within one week post-op to look at your home medications for possible changes or discontinuity.   Medications  - The nurse will provide a list of medications for you to continue at home   - If you received a Lovenox (Enoxaparin) or Apixiban (Eliquis) prescription at pre-op visit with Surgeon, start taking the medicine the morning after discharge unless directed otherwise.    - If you were prescribed Lovenox (Enoxaparin), review the education/teaching material/video with the nurse.    - Take post op pain meds as prescribed as needed.   - Continue Foltx until finished.   - Resume use of Actigall (Ursodiol) one (1) week after surgery if patient still has gallbladder. You should have been given a prescription at your pre-op visit. Contact the office if you do not have the prescription.   - Resume bariatric vitamin regimen as instructed in pre-op education with bariatric coordinator.    - Zegerid or Prilosec OTC (or generic) by mouth once daily for four (4) weeks unless you are already taking a proton pump inhibitor as home medication. Follow dosing instructions on package.   Nausea/Vomiting:  The following are possible causes for nausea/vomiting:  - Drinking too much or too fast.  - Sinus drainage/post nasal drip for allergy sufferers (you may take Sudafed, Claritin, Tylenol Sinus/Allergy, or other decongestants and nose sprays to help with this discomfort).  - Low blood sugar (sweating, shaky, irritable, weakness, dizzy or tunnel-vision) - treatment is to sip 100% fruit juice - no sugar added until symptoms subside.  - Acid in fruit juice - (may dilute with water or avoid).  - Eating or drinking something that is not on clear liquid (stage 1) diet.  Any nausea/vomiting that prohibits you from keeping fluids down for greater than 24 hours requires a call to the surgeon's office.  Urine:  Use your urine color as a guide to determine if you  are drinking enough fluid. The darker the urine, the more fluids you need to drink. Urine should be clear to light yellow if you are getting enough fluid. If you should experience frequency, burning or pain with urination, blood in urine, contact us or your primary care physician for possible UTI (urinary tract infection), which could require antibiotics (liquid preferred).  Bowel Movements:  You may not have a bowel movement for 2-5 days after going home. You may then experience liquid, runny or loose stools for approximately 3-4 weeks following surgery. This would require you to drink even more fluids to prevent dehydration. Some patients may experience constipation, which can be treated with increased fluids, drinking warm liquids, increased activity and the use of a Fleets Enema, Milk of Magnesia, or suppositories. The first couple of bowel movements could be bloody, tarry black or dark maroon in color. This is OK as long as the stool returns to a normal color in 1-2 days. If however, you have frequent or a large amount of bloody or tarry black stools and/or become light-headed or dizzy, you may be bleeding and require urgent attention. Please call us right away.  Abdominal Incisions:  You will have small incisions. Do not scrub incisions, but allow the warm, soapy water to run over the incisions, rinse well, and pat dry. You may use any brand of anti-bacterial soap. Do not use Peroxide or Neosporin type ointments on sites, unless instructed to do so by a surgeon or nurse. Monitor daily for signs/symptoms of infection, which might include: drainage with a foul odor, pain, redness, swelling or heat at the incision sight; fever, body aches and chills. If you suspect infection or have a fever, give us a call.  Pain:  You will be given a prescription for pain medication to control your pain. If you feel the dose is too strong, you may take half the ordered dose, or you may take Tylenol adult liquid per package  instructions for minor pain. Do not take any medications that contain aspirin or aspirin products.  Do not take medications like: Motrin, Aleve, Ibuprofen, Advil, Naproxen, Celebrex, Daypro, Bextra, Meloxicam or other medications commonly used for arthritis or joint pain.  No steroids or cortisone injections. There may be pain, which should improve every few days. Pain should not suddenly get worse or more intense. Pain that suddenly changes and is constant and severe should be called in to the surgeon's office. Any sudden pain in the lower extremities with associated warmth and redness should be called in to the surgeon's office immediately. Do not rub or massage this area, as it could be a blood clot.  Diet:  Remain on the clear liquid diet (stage 1) per your  which includes 70 grams of protein each day, sugar free, non carbonated and no straws. Day 1 is the day of surgery. If you are tolerating the stage 1 diet, you may then proceed to stage 2 diet, as instructed in the . Do not progress to the stage 2 diet if you are having nausea/vomiting. Refer to the Basic Nutrition and Food Principles guide.  Medications:  The nurse will let you know which medications you will need to continue once you go home. Do not take any medications that are extended or time released if you had the gastric bypass procedure, OK to take if you had the gastric sleeve procedure. Large capsules can be opened and diluted with clear liquids. Check with your physician or pharmacist as to which pills may be crushed and which capsules may be opened and diluted safely. Continue taking Foltx as surgeon orders. If you still have your gall bladder and were prescribed Actigall (Ursodiol), you may resume this medication one week after your surgery. You will remain on Actigall (Ursodiol) for approximately 6 months. The dose is 1 pill, 2 times each day for 6 months.  Activity:  Continue your deep breathing and coughing  exercises with your Incentive Spirometer breathing device at least every 3 hours while awake (10 repetitions each time) for one week. May use CPAP. This will help to prevent respiratory problems such as pneumonia. No lifting, pulling or tugging anything over 25 pounds for 3 weeks after surgery. You may shower but no tub baths, hot tubs or swimming for 2 weeks. Moderate walking is recommended every 3 hours while awake minimum, increase distance daily. Further exercise will be discussed at the first post-op visit. No driving or operating machinery allow until off narcotic pain medication and until you feel comfortable forcefully applying the brakes (usually takes 3 or more days). For the next few weeks you are at an increased risk for blood clot formation. Therefore you should walk regularly and you should not sit for prolonged periods of time, more than 45 minutes without getting up and walking for 5-10 minutes. This includes car rides. Including riding home from the hospital. If riding a distance greater than 30 miles over the next 2 weeks stop every 30-45 minutes and walk 5-10 mintues each time. No tanning bed use for 8 weeks after surgery and in general, not recommended due to the increased risk for skin cancer. Incisions will burn/blister very badly with tanning bed use.  Illness:  Your primary care physician should treat general illness such as ear infections, sinus infections, and viral type illnesses, etc. Medications prescribed should be liquid/elixir form when possible, for the first 30 days.  General:  In general, it is recommended that you weigh yourself no more than once per week. Let the weight come off you and concentrate on more important things. Remember the weight was not gained overnight, nor will it be lost overnight. Gastric Bypass/ Gastric Sleeve weight loss will continue over a period of 12-18 months. Do not  yourself according to how others are doing after surgery, as this will cause  unnecessary discouragement.  THE ABOVE ARE GENERAL GUIDELINES TO ASSIST YOU ONCE HOME, IF YOU ARE IN DOUBT, OR YOU HAVE ANY QUESTIONS, CALL US AT THE NUMBERS LISTED BELOW.  IN THE EVENT OF SUDDEN CHEST PAIN, SHORTNESS OF BREATH, OR ANY LIFE THREATENING CONDITION, CALL 911.  Any time you are evaluated or admitted to another facility, please have someone notify the surgeon's office.  Supplements:  70 grams of protein taken EVERY DAY. Remember to drink at least 64 ounces of fluid a day, sipping slowly early on. Increase this amount during the summertime. Sipping slowly will not stretch your new stomach. Drinking too fast or gulping liquids will cause brief discomfort and early could cause staple line disruption (leak). With eating, tiny bites, then chew, chew, chew, and swallow. Lay your fork/spoon down for 2-3 minutes, and then take your next bite. Your pouch will tell you within 1-2 bites if it is going to tolerate what you are eating.   Protein Vendors:  Refer to protein vendors' handout from consult class. You can always find protein drinks at the bariatric office, grocery stores, [employer], drug [employer], [employer], health food stores, and on the Internet. Find one high in protein (15-30 grams per serving) and low carb (less than 18 grams per serving).  Now is a great time to re-read your . Please review specific instructions given to you at discharge by your physician (surgeon).  HOW/WHEN TO CONTACT US:  It is imperative that you contact us with any of the following:    Ÿ fever greater than 101 degrees  Ÿ shortness of breath  Ÿ leg swelling  Ÿ body aches  Ÿ shaking chills  Ÿ nausea and vomitting  Ÿ pain that has worsened  Ÿ redness at incision sites  Ÿ pus or foul smelling drainage from an incision or wound  Ÿ inability to keep fluids down for more than a day  Ÿ any other condition you feel needs our attention.  [facility] - Bariatric: [phone] call this number anytime 24 hours a day /  7 days a week.  Teach-back Questions to be answered by the patient prior to discharge.   What complications would prompt you to call your doctor when you return home? _________________    What is the purpose of your prescribed medication? ________________  What are some potential side effects of the medications you will be taking at home? _______________

## 2021-12-03 NOTE — OP NOTE
PREOPERATIVE DIAGNOSIS:  Morbid obesity with multiple comorbidities as referenced in the most recent history and physical.    POSTOPERATIVE DIAGNOSIS:  Morbid obesity with multiple comorbidities as referenced in the most recent history and physical.    PROCEDURES PERFORMED:  1.  Robotic assisted laparoscopic sleeve gastrectomy   2.  Robotic assisted laparoscopic hiatal hernia repair  3.  Extensive lysis of adhesions modifier 22  SURGEON:  Lyn Gan MD FACS, FAMBS    ASSISTANT:  Caty Rizo RN    Surgery assisted and facilitated by a certified physician assistant, who directly resulted in a decreased operative time, anesthetic time, wound exposure, and possibly of an operative wound infection, thereby decreasing patient morbidity and ultimately total expenditures.  The surgical assistant assisted in placement of trochars, take down of the gastrocolic omentum, short gastric vessels and dissection at the angle of His.  Also assisted in retraction of the stomach during stapling so as not to kink the gastric sleeve.  Also assisted in removing of the gastric specimen, closure of the fascial defect as well as closure of the skin incisions.    ANESTHESIA:  General endotracheal.    ESTIMATED BLOOD LOSS:   Less than 25 mL unless dictated below.    FLUIDS:  Crystalloids.    SPECIMENS:  Gastric remnant    DRAINS:  None.    Implant Name Type Inv. Item Serial No.  Lot No. LRB No. Used Action   RELOAD STPLR SUREFORM 60 DAVINCI/X/XI 6ROW 3.5 ELIZA 1P/U - [id number] Implant RELOAD STPLR SUREFORM 60 DAVINCI/X/XI 6ROW 3.5 ELIZA 1P/U  INTUITIVE SURGICAL [id number] N/A 3 Implanted   RELOAD STPLR SUREFORM 60 DAVINCI/X/XI 6ROW 2.5 WHT 1P/U - [id number] Implant RELOAD STPLR SUREFORM 60 DAVINCI/X/XI 6ROW 2.5 WHT 1P/U  INTUITIVE SURGICAL [id number] N/A 4 Implanted   SEAL HEMO ABS SHADIA AH PWDR 3GM - [id number] Implant SEAL HEMO ABS SHADIA AH PWDR 3GM  MEDAFOR HEMOSTATIS "[employer] [id number] N/A 1 Implanted        COUNTS:  Correct.    COMPLICATIONS:  None.    INDICATIONS:  This patient with morbid obesity and associated comorbidities presents for elective laparoscopic, possible open sleeve gastrectomy.  The patient has received medical clearance to proceed.  The patient has undergone our extensive educational process and consent process and wishes to proceed.        DESCRIPTION OF PROCEDURE:  The patient was brought to the operating room and placed supine upon the operating room table. SCD hose were placed.  The patient underwent uneventful general endotracheal anesthesia per the anesthesiology staff. The abdomen was prepped with ChloraPrep and draped in the usual sterile fashion.  An Ioban was used as well if not allergic.  Depending on the technique to size the gastric sleeve, anesthesia staff either passed a 18-Serbian gastric tube or a 36-Serbian ViSiGi bougie into the stomach to decompress and then was pulled back to the esophagus.      An 8 mm transverse incision was made just to the left of midline.  The peritoneal cavity was entered under direct camera visualization using a 5  mm 0° laparoscope and an Optiview trocar.  The abdomen was then insufflated to a pressure of 15-16 mmHg with CO2 gas.  Exploratory laparoscopy revealed no evidence of injury from the entrance technique and no significant abnormalities unless addendum dictated below.  An angled laparoscope was then used.  The patient was placed in reverse Trendelenburg position.  Under direct camera visualization two 8 mm trocar was placed in the left lateral midabdominal position.  A 12 mm trocar was placed in the right abdomen.  A Ralph retractor was placed through an epigastric incision and used to elevate the left lobe of the liver.         Next the 30 degree 8 mm scope was brought into the 8 mm port just to the left of the umbilicus and the corresponding trocar was docked to the da Salma robot.  Targeting then took place and then the rest of the  trochars were docked to the robot and angled into position.  Instruments were brought in through the trochars in place and into laparoscopic view.          I then took control of the surgical console.    The patient had previous laparoscopic gastric banding in band removal.  There was adhesions of the omentum to the anterior abdominal wall near the liver and also adhesions to the liver as well.  This was taken down initially with scissors and then later the vessel sealer.  There was dense adhesions of the upper stomach to the liver and this was taken down with the vessel sealer.  There was extensive adhesions posteriorly of the stomach.       At this point, approximately MCFP along the greater curvature, the gastrocolic omentum was divided with the Vessel Sealer and this proceeded superiorly to the angle of His taking down the short gastric vessels.  All posterior attachments of the lesser sac and posterior aspect of the stomach to the pancreas were taken down as well.          Dissection then proceeded medially taking down the greater curvature with the vessel sealer to just proximal to the pylorus.  The 40-Botswanan orogastric tube was passed back down into the stomach for decompression and later to aid in sizing the sleeve.       I then evaluated the posterior isabella on the left side and there was a significant hiatal hernia defect.  There was dense adhesions of the previous gastric band.  I then worked on the right side and took down the pars flaccida and finally was able to mobilize the esophagus free from the posterior isabella.  There was a large fat pad in the isabella that was removed as well.  I then used 0 Ethibond suture and placed 2 figure-of-eight 0 Ethibond sutures posteriorly for hiatal hernia repair.       I then used the Surefire stapler and a blue load was used to create the gastric sleeve.   There were additional firings to complete the gastric sleeve near the angle of Hiss, see above.  I then used the  suction  to investigate for any bleeding around the spleen and there was none.       I then placed a gastropexy stitch of the posterior upper stomach to the lower right isabella to aid in keeping the stomach fixed to the abdominal cavity.    The specimen was removed through the 12 mm port site.  Steffi was used on the staple line. The liver retractor was removed. The fascia at the 12 mm trocar site incision that was used for extraction was closed with a single 0 Vicryl suture in a figure-of-eight fashion placed under direct laparoscopic camera visualization with a suture passer and tying the knot extracorporeally.  The fascia in the area was infiltrated with local anesthesia.    During the process of administering local anesthetic to the incision on the lower right the needle broke off in the subcutaneous fat tissue.  I attempted to use ultrasound but was not able to find the needle.  I then used fluoroscopy and was eventually able to locate the needle in the subcutaneous fat tissue and retrieve it.       All incisions were then infiltrated with local anesthetic. The remaining trocars were removed under direct camera visualization with no bleeding noted from their sites.  The abdomen was desufflated of gas. The skin in each incision was closed using 4-0 antibiotic impregnated Monocryl in a subcuticular fashion followed by Dermabond.  The patient tolerated the procedure well without complication and was taken to the recovery room in stable condition.  All sponge, needle and instrument counts were correct.        The adhesions from the patient's previous laparoscopic gastric band were very dense and this made the operation take twice as long as normal.  Modifier 22.

## 2021-12-03 NOTE — PLAN OF CARE
Goal Outcome Evaluation:   Pt AAOx4. VSS. On 4 L oxygen. O2 sat staying above 95% on 4L, she needs to d/c on 4 L per RT. Plan to d/c tomorrow. Up w/1 assist. Will continue to monitor.

## 2021-12-03 NOTE — PROGRESS NOTES
Exercise Oximetry    Patient Name:Kyle Pennington   MRN: [id number]   Date: 12/03/21             ROOM AIR BASELINE   SpO2% 85   Heart Rate 70   Blood Pressure NA     EXERCISE ON ROOM AIR SpO2% EXERCISE ON O2 @  LPM SpO2%   1 MINUTE  1 MINUTE    2 MINUTES  2 MINUTES    3 MINUTES  3 MINUTES    4 MINUTES  4 MINUTES    5 MINUTES  5 MINUTES    6 MINUTES  6 MINUTES               Distance Walked  NA Distance Walked   Dyspnea (Addis Scale)   Dyspnea (Addis Scale)   Fatigue (Addis Scale)   Fatigue (Addis Scale)   SpO2% Post Exercise   SpO2% Post Exercise   HR Post Exercise   HR Post Exercise   Time to Recovery   Time to Recovery     Comments: I put patient on room air for about five minutes. The patient dropped to 85% on her SpO2 after a few minutes. I put patient back on her Oxygen at 4 liters, and she recovered to 92%. The patient will required 4 liters of oxygen.

## 2021-12-03 NOTE — PLAN OF CARE
Goal Outcome Evaluation:   Pt AAOx4. VSS. On 4 L oxygen. O2 sat staying above 95% on 4L, she needs to d/c on 4 L per RT. Up w/1 assist. Will continue to monitor. Discharge this evening per Dr. Gan.

## 2021-12-03 NOTE — PAYOR COMM NOTE
"Pt had scheduled prior approved inpt surgery as planned on 12/2/21.  Auth# [id number]      Kristi Rubin, RN, BSN  Utilization Review Nurse  [facility]  Direct & confidential phone # [phone]  Fax # [phone]      Kyle Jonas (41 y.o. Female)             Date of Birth Social Security Number Address Home Phone MRN    1980  [address] [facility] [address]  [place] [address] [phone] [id number]    Luther[employer] Marital Status             Catholic Single       Admission Date Admission Type Admitting Provider Attending Provider Department, Room/Bed    12/2/21 Elective Lyn Gan MD Gore, Lanny, MD [facility] SURGICAL INPATIENT, 4112/1    Discharge Date Discharge Disposition Discharge Destination                         Attending Provider: Lyn Gan MD    Allergies: Antihistamines, Diphenhydramine-type    Isolation: None   Infection: COVID (History) (12/02/21)   Code Status: CPR   Advance Care Planning Activity    Ht: 167.6 cm (66\")   Wt: 181 kg (398 lb 12.8 oz)    Admission Cmt: None   Principal Problem: Obesity, Class III, BMI 40-49.9 (morbid obesity) (HCC) [E66.01]                 Active Insurance as of 12/2/2021     Primary Coverage     Payor Plan Insurance Group Employer/Plan Group    [id number]     Payor Plan Address Payor Plan Phone Number Payor Plan Fax Number Effective Dates    MAIL STOP:   12/1/2017 - None Entered    [address] [address]       [facility] [address]       Subscriber Name Subscriber Birth Date Member ID       KYLE JONAS 1980 [id number]                 Emergency Contacts      (Rel.) Home Phone Work Phone Mobile Phone    DONTA FAM (Mother) [phone] -- [phone]    ETHAN BAUTISTA (Sister) -- -- [phone]    JOSE Fam (Father) -- -- [phone]               Operative/Procedure Notes (last 48 hours)      Lyn Gan MD at 12/02/21 1643      "       PREOPERATIVE DIAGNOSIS:  Morbid obesity with multiple comorbidities as referenced in the most recent history and physical.    POSTOPERATIVE DIAGNOSIS:  Morbid obesity with multiple comorbidities as referenced in the most recent history and physical.    PROCEDURES PERFORMED:  1.  Robotic assisted laparoscopic sleeve gastrectomy   2.  Robotic assisted laparoscopic hiatal hernia repair  3.  Extensive lysis of adhesions modifier 22  SURGEON:  Lyn Gan MD FACS, FAMBS    ASSISTANT:  Caty Rizo RN    Surgery assisted and facilitated by a certified physician assistant, who directly resulted in a decreased operative time, anesthetic time, wound exposure, and possibly of an operative wound infection, thereby decreasing patient morbidity and ultimately total expenditures.  The surgical assistant assisted in placement of trochars, take down of the gastrocolic omentum, short gastric vessels and dissection at the angle of His.  Also assisted in retraction of the stomach during stapling so as not to kink the gastric sleeve.  Also assisted in removing of the gastric specimen, closure of the fascial defect as well as closure of the skin incisions.    ANESTHESIA:  General endotracheal.    ESTIMATED BLOOD LOSS:   Less than 25 mL unless dictated below.    FLUIDS:  Crystalloids.    SPECIMENS:  Gastric remnant    DRAINS:  None.    Implant Name Type Inv. Item Serial No.  Lot No. LRB No. Used Action   RELOAD STPLR SUREFORM 60 DAVINCI/X/XI 6ROW 3.5 ELIZA 1P/U - [id number] Implant RELOAD STPLR SUREFORM 60 DAVINCI/X/XI 6ROW 3.5 ELIZA 1P/U  INTUITIVE SURGICAL [id number] N/A 3 Implanted   RELOAD STPLR SUREFORM 60 DAVINCI/X/XI 6ROW 2.5 WHT 1P/U - [id number] Implant RELOAD STPLR SUREFORM 60 DAVINCI/X/XI 6ROW 2.5 WHT 1P/U  INTUITIVE SURGICAL [id number] N/A 4 Implanted   SEAL HEMO ABS SHADIA AH PWDR 3GM - [id number] Implant SEAL HEMO ABS SHADIA AH PWDR 3GM  MEDAFOR HEMOSTATIS [employer] [id number] N/A 1 Implanted        COUNTS:  Correct.    COMPLICATIONS:  None.    INDICATIONS:  This patient with morbid obesity and associated comorbidities presents for elective laparoscopic, possible open sleeve gastrectomy.  The patient has received medical clearance to proceed.  The patient has undergone our extensive educational process and consent process and wishes to proceed.        DESCRIPTION OF PROCEDURE:  The patient was brought to the operating room and placed supine upon the operating room table. SCD hose were placed.  The patient underwent uneventful general endotracheal anesthesia per the anesthesiology staff. The abdomen was prepped with ChloraPrep and draped in the usual sterile fashion.  An Ioban was used as well if not allergic.  Depending on the technique to size the gastric sleeve, anesthesia staff either passed a 18-Yi gastric tube or a 36-Yi ViSiGi bougie into the stomach to decompress and then was pulled back to the esophagus.      An 8 mm transverse incision was made just to the left of midline.  The peritoneal cavity was entered under direct camera visualization using a 5  mm 0° laparoscope and an Optiview trocar.  The abdomen was then insufflated to a pressure of 15-16 mmHg with CO2 gas.  Exploratory laparoscopy revealed no evidence of injury from the entrance technique and no significant abnormalities unless addendum dictated below.  An angled laparoscope was then used.  The patient was placed in reverse Trendelenburg position.  Under direct camera visualization two 8 mm trocar was placed in the left lateral midabdominal position.  A 12 mm trocar was placed in the right abdomen.  A Ralph retractor was placed through an epigastric incision and used to elevate the left lobe of the liver.         Next the 30 degree 8 mm scope was brought into the 8 mm port just to the left of the umbilicus and the corresponding trocar was docked to the da Salma robot.  Targeting then took place and then the rest of the  trochars were docked to the robot and angled into position.  Instruments were brought in through the trochars in place and into laparoscopic view.          I then took control of the surgical console.    The patient had previous laparoscopic gastric banding in band removal.  There was adhesions of the omentum to the anterior abdominal wall near the liver and also adhesions to the liver as well.  This was taken down initially with scissors and then later the vessel sealer.  There was dense adhesions of the upper stomach to the liver and this was taken down with the vessel sealer.  There was extensive adhesions posteriorly of the stomach.       At this point, approximately correction along the greater curvature, the gastrocolic omentum was divided with the Vessel Sealer and this proceeded superiorly to the angle of His taking down the short gastric vessels.  All posterior attachments of the lesser sac and posterior aspect of the stomach to the pancreas were taken down as well.          Dissection then proceeded medially taking down the greater curvature with the vessel sealer to just proximal to the pylorus.  The 40-Chilean orogastric tube was passed back down into the stomach for decompression and later to aid in sizing the sleeve.       I then evaluated the posterior isabella on the left side and there was a significant hiatal hernia defect.  There was dense adhesions of the previous gastric band.  I then worked on the right side and took down the pars flaccida and finally was able to mobilize the esophagus free from the posterior isabella.  There was a large fat pad in the isabella that was removed as well.  I then used 0 Ethibond suture and placed 2 figure-of-eight 0 Ethibond sutures posteriorly for hiatal hernia repair.       I then used the Surefire stapler and a blue load was used to create the gastric sleeve.   There were additional firings to complete the gastric sleeve near the angle of Hiss, see above.  I then used the  suction  to investigate for any bleeding around the spleen and there was none.       I then placed a gastropexy stitch of the posterior upper stomach to the lower right isabella to aid in keeping the stomach fixed to the abdominal cavity.    The specimen was removed through the 12 mm port site.  Steffi was used on the staple line. The liver retractor was removed. The fascia at the 12 mm trocar site incision that was used for extraction was closed with a single 0 Vicryl suture in a figure-of-eight fashion placed under direct laparoscopic camera visualization with a suture passer and tying the knot extracorporeally.  The fascia in the area was infiltrated with local anesthesia.    During the process of administering local anesthetic to the incision on the lower right the needle broke off in the subcutaneous fat tissue.  I attempted to use ultrasound but was not able to find the needle.  I then used fluoroscopy and was eventually able to locate the needle in the subcutaneous fat tissue and retrieve it.       All incisions were then infiltrated with local anesthetic. The remaining trocars were removed under direct camera visualization with no bleeding noted from their sites.  The abdomen was desufflated of gas. The skin in each incision was closed using 4-0 antibiotic impregnated Monocryl in a subcuticular fashion followed by Dermabond.  The patient tolerated the procedure well without complication and was taken to the recovery room in stable condition.  All sponge, needle and instrument counts were correct.        The adhesions from the patient's previous laparoscopic gastric band were very dense and this made the operation take twice as long as normal.  Modifier 22.    Electronically signed by Lyn Gan MD at 12/02/21 1930          Physician Progress Notes (last 48 hours)      Lyn Gan MD at 12/03/21 0815          Subjective:      Subjective Kyle Pennington  is post op day one status post procedure  "listed. Patient denies shortness of air and lower extremity pain. Feels better than yesterday. No vomiting this am. Ambulating well and using incentive spirometer.      Objective:     Objective   /68 (BP Location: Right arm, Patient Position: Lying)   Pulse 78   Temp 97.8 °F (36.6 °C) (Axillary)   Resp 17   Ht 167.6 cm (66\")   Wt (!) 181 kg (398 lb 12.8 oz)   LMP 02/15/2021 (Exact Date)   SpO2 93%   BMI 64.37 kg/m²     General:  alert, appears stated age and cooperative   Abdomen: soft, bowel sounds active, appropriate tenderness   Incision:   healing well, no drainage, no erythema, no hernia, no seroma, no swelling, no dehiscence, incision well approximated   Heart: Regular rate   Lungs: Clear to auscultation bilaterally     I reviewed the patient's new clinical results.     Lab Results (last 24 hours)     Procedure Component Value Units Date/Time    Comprehensive Metabolic Panel [[id number]]  (Abnormal) Collected: 12/03/21 0252    Specimen: Blood Updated: 12/03/21 0349     Glucose 185 mg/dL      BUN 11 mg/dL      Creatinine 0.76 mg/dL      Sodium 136 mmol/L      Potassium 3.2 mmol/L      Chloride 96 mmol/L      CO2 24.0 mmol/L      Calcium 9.0 mg/dL      Total Protein 7.1 g/dL      Albumin 3.90 g/dL      ALT (SGPT) 70 U/L      AST (SGOT) 51 U/L      Alkaline Phosphatase 117 U/L      Total Bilirubin 0.5 mg/dL      eGFR Non African Amer 83 mL/min/1.73      Globulin 3.2 gm/dL      A/G Ratio 1.2 g/dL      BUN/Creatinine Ratio 14.5     Anion Gap 16.0 mmol/L     Narrative:      GFR Normal >60  Chronic Kidney Disease <60  Kidney Failure <15      Phosphorus [[id number]]  (Normal) Collected: 12/03/21 0252    Specimen: Blood Updated: 12/03/21 0349     Phosphorus 3.3 mg/dL     Magnesium [[id number]]  (Normal) Collected: 12/03/21 0252    Specimen: Blood Updated: 12/03/21 0349     Magnesium 2.0 mg/dL     CBC & Differential [[id number]]  (Abnormal) Collected: 12/03/21 0252    Specimen: Blood Updated: 12/03/21 0330    " Narrative:      The following orders were created for panel order CBC & Differential.  Procedure                               Abnormality         Status                     ---------                               -----------         ------                     CBC Auto Differential[[id number]]        Abnormal            Final result                 Please view results for these tests on the individual orders.    CBC Auto Differential [[id number]]  (Abnormal) Collected: 12/03/21 0252    Specimen: Blood Updated: 12/03/21 0330     WBC 8.90 10*3/mm3      RBC 5.00 10*6/mm3      Hemoglobin 14.3 g/dL      Hematocrit 42.0 %      MCV 83.9 fL      MCH 28.6 pg      MCHC 34.0 g/dL      RDW 15.3 %      RDW-SD 44.6 fl      MPV 8.8 fL      Platelets 232 10*3/mm3      Neutrophil % 92.3 %      Lymphocyte % 4.6 %      Monocyte % 3.0 %      Eosinophil % 0.0 %      Basophil % 0.1 %      Neutrophils, Absolute 8.30 10*3/mm3      Lymphocytes, Absolute 0.40 10*3/mm3      Monocytes, Absolute 0.30 10*3/mm3      Eosinophils, Absolute 0.00 10*3/mm3      Basophils, Absolute 0.00 10*3/mm3      nRBC 0.1 /100 WBC     Pregnancy, Urine - Urine, Clean Catch [[id number]]  (Normal) Collected: 12/02/21 1447    Specimen: Urine, Clean Catch Updated: 12/02/21 1504     HCG, Urine QL Negative    Potassium [[id number]]  (Abnormal) Collected: 12/02/21 1238    Specimen: Blood Updated: 12/02/21 1302     Potassium 3.2 mmol/L             Assessment:     Assessment Doing well postoperatively.     Plan:   1. Start diet  2. Continue with ambulation and Incentive spirometry  3. Plan for d/c home tomorrow due to low O2 sats.  She saw a pulmonologist in [facility] Dr. Lund and prior to surgery and was told that a CPAP was ordered however the medical supply company called [employer] in [facility] apparently has no order yet.  I will see if my office can call them today.    4. Lovenox 40 bid      Electronically signed by Lyn Gan MD at 12/03/21 8448        Consult Notes (last 48 hours)  Notes from 12/01/21 1256 through 12/03/21 1256   No notes of this type exist for this encounter.

## 2021-12-03 NOTE — ANESTHESIA POSTPROCEDURE EVALUATION
Patient: Kyle Pennington    Procedure Summary     Date: 12/02/21 Room / Location: [facility] OR 08 / [facility] MAIN OR    Anesthesia Start: 1615 Anesthesia Stop: 1938    Procedures:       GASTRIC SLEEVE LAPAROSCOPIC WITH DAVINCI ROBOT (N/A Abdomen)      LAPAROSCOPIC HIATAL HERNIA REPAIR WITH DAVINCI ROBOT (N/A )      extensive LAPAROSCOPIC LYSIS OF ADHESIONS (N/A Abdomen) Diagnosis:       Morbid obesity with BMI of 45.0-49.9, adult (HCC)      (Morbid obesity with BMI of 45.0-49.9, adult (HCC) [E66.01, Z68.42])    Surgeons: Lyn Gan MD Provider: Shanwa Reddy MD    Anesthesia Type: general ASA Status: 4          Anesthesia Type: general    Vitals  Vitals Value Taken Time   /90 12/02/21 2005   Temp     Pulse 79 12/02/21 2007   Resp 20 12/02/21 1953   SpO2 99 % 12/02/21 2007   Vitals shown include unvalidated device data.        Post Anesthesia Care and Evaluation    Patient location during evaluation: PACU  Patient participation: complete - patient participated  Level of consciousness: awake  Pain management: adequate  Airway patency: patent  Anesthetic complications: No anesthetic complications  PONV Status: controlled  Cardiovascular status: acceptable  Respiratory status: acceptable and CPAP  Hydration status: acceptable  Post Neuraxial Block status: Motor and sensory function returned to baseline  Comments: Pt on cpap in pacu for sats in high 80s, low 90s.  Tolerated well.  Sats improved to 93-95%

## 2021-12-03 NOTE — PLAN OF CARE
Goal Outcome Evaluation:           Progress: improving  Outcome Summary: pt. vss this shift, no c/o pain or discomfort. She has ambulated twice this shift due to increasing 02 needs. She is requiring 5L NC at this time. Refused BIPAP. she is able to voice all needs. Care plan on-going

## 2021-12-04 NOTE — OUTREACH NOTE
Prep Survey      Responses   Rastafarian facility patient discharged from? [facility]   Is LACE score < 7 ? Yes   Emergency Room discharge w/ pulse ox? No   Eligibility Readm Mgmt   Discharge diagnosis lap gastric sleeve   Does the patient have one of the following disease processes/diagnoses(primary or secondary)? General Surgery   Does the patient have Home health ordered? No   Is there a DME ordered? No   Prep survey completed? Yes          Yaz Suggs RN

## 2021-12-06 ENCOUNTER — HOSPITAL ENCOUNTER (OUTPATIENT)
Dept: INFUSION THERAPY | Facility: HOSPITAL | Age: 41
Setting detail: INFUSION SERIES
Discharge: HOME | End: 2021-12-06

## 2021-12-06 ENCOUNTER — OFFICE VISIT (OUTPATIENT)
Dept: BARIATRICS/WEIGHT MGMT | Facility: CLINIC | Age: 41
End: 2021-12-06

## 2021-12-06 VITALS
OXYGEN SATURATION: 97 % | HEART RATE: 75 BPM | SYSTOLIC BLOOD PRESSURE: 138 MMHG | TEMPERATURE: 98.9 F | DIASTOLIC BLOOD PRESSURE: 86 MMHG

## 2021-12-06 VITALS
HEART RATE: 71 BPM | DIASTOLIC BLOOD PRESSURE: 88 MMHG | BODY MASS INDEX: 47.09 KG/M2 | TEMPERATURE: 98.6 F | WEIGHT: 293 LBS | HEIGHT: 66 IN | RESPIRATION RATE: 19 BRPM | OXYGEN SATURATION: 99 % | SYSTOLIC BLOOD PRESSURE: 131 MMHG

## 2021-12-06 DIAGNOSIS — E66.01 OBESITY, CLASS III, BMI 40-49.9 (MORBID OBESITY) (HCC): ICD-10-CM

## 2021-12-06 DIAGNOSIS — E66.01 OBESITY, CLASS III, BMI 40-49.9 (MORBID OBESITY) (HCC): Primary | ICD-10-CM

## 2021-12-06 DIAGNOSIS — E66.01 MORBID OBESITY (HCC): Primary | ICD-10-CM

## 2021-12-06 LAB
ALBUMIN SERPL-MCNC: 4.1 G/DL (ref 3.5–5.2)
ALBUMIN/GLOB SERPL: 1.1 G/DL
ALP SERPL-CCNC: 135 U/L (ref 39–117)
ALT SERPL W P-5'-P-CCNC: 43 U/L (ref 1–33)
ANION GAP SERPL CALCULATED.3IONS-SCNC: 19 MMOL/L (ref 5–15)
AST SERPL-CCNC: 23 U/L (ref 1–32)
BASOPHILS # BLD AUTO: 0 10*3/MM3 (ref 0–0.2)
BASOPHILS NFR BLD AUTO: 0.3 % (ref 0–1.5)
BILIRUB SERPL-MCNC: 1 MG/DL (ref 0–1.2)
BUN SERPL-MCNC: 9 MG/DL (ref 6–20)
BUN/CREAT SERPL: 12.7 (ref 7–25)
CALCIUM SPEC-SCNC: 9.5 MG/DL (ref 8.6–10.5)
CHLORIDE SERPL-SCNC: 91 MMOL/L (ref 98–107)
CO2 SERPL-SCNC: 27 MMOL/L (ref 22–29)
CREAT SERPL-MCNC: 0.71 MG/DL (ref 0.57–1)
DEPRECATED RDW RBC AUTO: 44.2 FL (ref 37–54)
EOSINOPHIL # BLD AUTO: 0.2 10*3/MM3 (ref 0–0.4)
EOSINOPHIL NFR BLD AUTO: 2.5 % (ref 0.3–6.2)
ERYTHROCYTE [DISTWIDTH] IN BLOOD BY AUTOMATED COUNT: 15 % (ref 12.3–15.4)
GFR SERPL CREATININE-BSD FRML MDRD: 90 ML/MIN/1.73
GLOBULIN UR ELPH-MCNC: 3.7 GM/DL
GLUCOSE SERPL-MCNC: 102 MG/DL (ref 65–99)
HCT VFR BLD AUTO: 41.9 % (ref 34–46.6)
HGB BLD-MCNC: 14.6 G/DL (ref 12–15.9)
LAB AP CASE REPORT: NORMAL
LYMPHOCYTES # BLD AUTO: 1.3 10*3/MM3 (ref 0.7–3.1)
LYMPHOCYTES NFR BLD AUTO: 16.7 % (ref 19.6–45.3)
MAGNESIUM SERPL-MCNC: 1.9 MG/DL (ref 1.6–2.6)
MCH RBC QN AUTO: 29.1 PG (ref 26.6–33)
MCHC RBC AUTO-ENTMCNC: 34.9 G/DL (ref 31.5–35.7)
MCV RBC AUTO: 83.5 FL (ref 79–97)
MONOCYTES # BLD AUTO: 0.5 10*3/MM3 (ref 0.1–0.9)
MONOCYTES NFR BLD AUTO: 6.2 % (ref 5–12)
NEUTROPHILS NFR BLD AUTO: 5.9 10*3/MM3 (ref 1.7–7)
NEUTROPHILS NFR BLD AUTO: 74.3 % (ref 42.7–76)
NRBC BLD AUTO-RTO: 0.1 /100 WBC (ref 0–0.2)
PATH REPORT.FINAL DX SPEC: NORMAL
PATH REPORT.GROSS SPEC: NORMAL
PLATELET # BLD AUTO: 263 10*3/MM3 (ref 140–450)
PMV BLD AUTO: 9 FL (ref 6–12)
POTASSIUM SERPL-SCNC: 3 MMOL/L (ref 3.5–5.2)
PROT SERPL-MCNC: 7.8 G/DL (ref 6–8.5)
RBC # BLD AUTO: 5.02 10*6/MM3 (ref 3.77–5.28)
SODIUM SERPL-SCNC: 137 MMOL/L (ref 136–145)
WBC NRBC COR # BLD: 7.9 10*3/MM3 (ref 3.4–10.8)

## 2021-12-06 PROCEDURE — 83735 ASSAY OF MAGNESIUM: CPT | Performed by: SURGERY

## 2021-12-06 PROCEDURE — 85025 COMPLETE CBC W/AUTO DIFF WBC: CPT | Performed by: SURGERY

## 2021-12-06 PROCEDURE — 80053 COMPREHEN METABOLIC PANEL: CPT | Performed by: SURGERY

## 2021-12-06 PROCEDURE — 96360 HYDRATION IV INFUSION INIT: CPT

## 2021-12-06 PROCEDURE — 96361 HYDRATE IV INFUSION ADD-ON: CPT

## 2021-12-06 PROCEDURE — 99024 POSTOP FOLLOW-UP VISIT: CPT | Performed by: SURGERY

## 2021-12-06 RX ORDER — SODIUM CHLORIDE 9 MG/ML
2000 INJECTION, SOLUTION INTRAVENOUS ONCE
OUTPATIENT
Start: 2021-12-06

## 2021-12-06 RX ORDER — SODIUM CHLORIDE 9 MG/ML
2000 INJECTION, SOLUTION INTRAVENOUS ONCE
Status: CANCELLED | OUTPATIENT
Start: 2021-12-06

## 2021-12-06 RX ORDER — SODIUM CHLORIDE 9 MG/ML
2000 INJECTION, SOLUTION INTRAVENOUS ONCE
Status: COMPLETED | OUTPATIENT
Start: 2021-12-06 | End: 2021-12-06

## 2021-12-06 RX ADMIN — SODIUM CHLORIDE 2000 ML: 9 INJECTION, SOLUTION INTRAVENOUS at 11:49

## 2021-12-06 NOTE — PROGRESS NOTES
[facility] BAR SURG [facility] BARIATRIC SURGERY  [address] [place] [address]  [address] [place] [address]  Dept: [phone]  12/6/2021      Kyle Pennington.  [id number]  [id number]  1980  female      Chief Complaint   Patient presents with   • Post-op     1 wk GS w/hiatal hernia       BH Post-Op Bariatric Surgery:   Kyle Pennington is status post procedure listed above  HPI:     Wt Readings from Last 10 Encounters:   12/06/21 (!) 180 kg (396 lb 6.4 oz)   12/02/21 (!) 181 kg (398 lb 12.8 oz)   10/29/21 (!) 198 kg (435 lb 12.8 oz)   04/21/21 (!) 184 kg (406 lb)   03/10/21 (!) 184 kg (406 lb)   02/26/21 (!) 184 kg (406 lb 8 oz)   02/18/21 (!) 182 kg (401 lb 3.8 oz)   01/04/21 (!) 174 kg (382 lb 11.5 oz)   11/19/20 (!) 171 kg (376 lb)   11/12/20 (!) 170 kg (375 lb)        Today's weight is (!) 180 kg (396 lb 6.4 oz) pounds, today's BMI is Body mass index is 63.98 kg/m².,@ has a  loss of 2 pounds since the last visit and@ weight loss since surgery is 2 pounds. The patient reports a decreased portion size and loss of appetite.      Kyle Pennington is not able to drink fluids due to pain     Review of Systems   Constitutional: Positive for chills.   Gastrointestinal: Positive for abdominal pain. Negative for diarrhea and nausea.       Patient Active Problem List   Diagnosis   • Morbid obesity (HCC)   • Advanced maternal age (AMA) in pregnancy   • Chronic hypertension in pregnancy   • History of laparoscopic adjustable gastric banding   • Twin pregnancy in second trimester   • BMI 50.0-59.9, adult (HCC)   • Pre-operative examination   • GERD (gastroesophageal reflux disease)   • Iron deficiency anemia   • Iron malabsorption   • Anxiety   • Vaginal bleeding   • Anemia   • Depressive disorder   • Edema   • Endometrial hyperplasia   • Hypertension   • Shortness of breath   • Obesity, Class III, BMI 40-49.9 (morbid obesity) (HCC)       Past Medical  History:   Diagnosis Date   • Anxiety and depression    • Arthritis    • Bipolar affective (HCC)    • Constipation    • Edema leg    • Fibrocystic breast    • GERD (gastroesophageal reflux disease)    • Hypertension    • Morbid obesity (HCC)        The following portions of the patient's history were reviewed and updated as appropriate: allergies, current medications, past family history, past medical history, past social history, past surgical history and problem list.    Vitals:    12/06/21 1021   BP: 131/88   Pulse: 71   Resp: 19   Temp: 98.6 °F (37 °C)   SpO2: 99%       Physical Exam  No distress   Normal pulmonary effort  Incisions with some erythema, line drawn, no warmth    Assessment:   Post-op, the patient is not getting enough fluids, will send to [facility]. No epigastric pain. She does have incisional pain. Will see again in 4 days.      Plan:     Encouraged patient to be sure to get plenty of lean protein per day through small frequent meals all with a protein source.   Activity restrictions: none.   Recommended patient be sure to get at least 70 grams of protein per day by eating small, frequent meals all with high lean protein choices. Be sure to limit/cut back on daily carbohydrate intake. Discussed with the patient the recommended amount of water per day to intake- half of body weight in ounces. Reviewed vitamin requirements. Be sure to do routine exercise, 150 minutes per week minimum, including both cardio and strength training.     Instructions / Recommendations: dietary counseling recommended, recommended a daily protein intake of  grams, vitamin supplement(s) recommended, recommended exercising at least 150 minutes per week, behavior modifications recommended and instructed to call the office for concerns, questions, or problems.     The patient was instructed to follow up in 4 days .     The patient was counseled regarding. Total time spent face to face was 15  minutes and 15 minutes was spent  counseling.

## 2021-12-06 NOTE — PAYOR COMM NOTE
"DC notice Kyle Jonas  1980  Ref #[id number]  DC 21 routine to home.    [facility] PENNY ORELLANA [facility]    [phone]      Kyle Jonas (41 y.o. Female)             Date of Birth Social Security Number Address Home Phone MRN    1980  [address]  [place] [address] [phone] [id number]    Buddhism Marital Status             Muslim Single       Admission Date Admission Type Admitting Provider Attending Provider Department, Room/Bed    21 Elective Lyn Gan MD  [facility] SURGICAL INPATIENT,     Discharge Date Discharge Disposition Discharge Destination          12/3/2021 Home or Self Care Home             Attending Provider: (none)   Allergies: Antihistamines, Diphenhydramine-type    Isolation: None   Infection: COVID (History) (21)   Code Status: Prior   Advance Care Planning Activity    Ht: 167.6 cm (66\")   Wt: 181 kg (398 lb 12.8 oz)    Admission Cmt: None   Principal Problem: Obesity, Class III, BMI 40-49.9 (morbid obesity) ([facility]) [E66.01]                 Active Insurance as of 2021     Primary Coverage     Payor Plan Insurance Group Employer/Plan Group    [id number]     Payor Plan Address Payor Plan Phone Number Payor Plan Fax Number Effective Dates    MAIL STOP:   2017 - None Entered    [address] [address]       [facility] [place] [address]       Subscriber Name Subscriber Birth Date Member ID       KYLE JONAS 1980 [id number]                 Emergency Contacts      (Rel.) Home Phone Work Phone Mobile Phone    DONTA FAM (Mother) [phone] -- [phone]    ETHAN BAUTISTA (Sister) -- -- [phone]    JOSE Fam (Father) -- -- [phone]            Discharge Summary    No notes of this type exist for this encounter.         " How Severe Are Your Spot(S)?: mild Have Your Spot(S) Been Treated In The Past?: has not been treated Hpi Title: Evaluation of Skin Lesions

## 2021-12-07 RX ORDER — POTASSIUM CHLORIDE 20 MEQ/1
20 TABLET, EXTENDED RELEASE ORAL 2 TIMES DAILY
Qty: 14 TABLET | Refills: 2 | Status: SHIPPED | OUTPATIENT
Start: 2021-12-07 | End: 2021-12-15 | Stop reason: HOSPADM

## 2021-12-07 NOTE — DISCHARGE SUMMARY
Date of Discharge:  12/7/2021    Discharge Diagnosis: morbid obesity    Presenting Problem/History of Present Illness  Active Hospital Problems    Diagnosis  POA   • **Obesity, Class III, BMI 40-49.9 (morbid obesity) ([facility]) [E66.01]  Unknown      Resolved Hospital Problems   No resolved problems to display.           Hospital Course  Patient is a 41 y.o. female presented with morbid obesity.  She had a history of lap band placement and LAP-BAND removal.  During her operation it was found she had a lot of adhesions and also hiatal hernia and therefore she underwent an extensive lysis of adhesions and hiatal hernia repair in addition to vertical sleeve gastrectomy.    Procedures Performed    Procedure(s):  GASTRIC SLEEVE LAPAROSCOPIC WITH DAVINCI ROBOT  LAPAROSCOPIC HIATAL HERNIA REPAIR WITH DAVINCI ROBOT  extensive LAPAROSCOPIC LYSIS OF ADHESIONS  -------------------       Consults:   Consults     No orders found from 11/3/2021 to 12/3/2021.          Pertinent Test Results:     Condition on Discharge:  good    Vital Signs       Physical Exam:     General Appearance:    Alert, cooperative, in no acute distress   Head:    Normocephalic, without obvious abnormality, atraumatic   Eyes:            Lids and lashes normal, conjunctivae and sclerae normal, no   icterus, no pallor, corneas clear, PERRLA   Ears:    Ears appear intact with no abnormalities noted   Throat:   No oral lesions, no thrush, oral mucosa moist   Neck:   No adenopathy, supple, trachea midline, no thyromegaly, no     carotid bruit, no JVD   Back:     No kyphosis present, no scoliosis present, no skin lesions,       erythema or scars, no tenderness to percussion or                   palpation,   range of motion normal   Lungs:     Clear to auscultation,respirations regular, even and                   unlabored    Heart:    Regular rhythm and normal rate, normal S1 and S2, no            murmur, no gallop, no rub, no click   Breast Exam:    Deferred    Abdomen:     Normal bowel sounds, no masses, no organomegaly, soft        non-tender, non-distended, no guarding, no rebound                 tenderness   Genitalia:    Deferred   Extremities:   Moves all extremities well, no edema, no cyanosis, no              redness   Pulses:   Pulses palpable and equal bilaterally   Skin:   No bleeding, bruising or rash   Lymph nodes:   No palpable adenopathy   Neurologic:   Cranial nerves 2 - 12 grossly intact, sensation intact, DTR        present and equal bilaterally       Discharge Disposition  Home or Self Care    Discharge Medications     Discharge Medications      New Medications      Instructions Start Date   enoxaparin 40 MG/0.4ML solution syringe  Commonly known as: Lovenox   40 mg, Subcutaneous, Every 12 Hours Scheduled      HYDROmorphone 2 MG tablet  Commonly known as: Dilaudid   2 mg, Oral, Every 6 Hours PRN      ondansetron ODT 8 MG disintegrating tablet  Commonly known as: Zofran ODT   8 mg, Translingual, Every 8 Hours PRN         Continue These Medications      Instructions Start Date   buPROPion  MG 24 hr tablet  Commonly known as: WELLBUTRIN XL   150 mg, Oral, Every Morning, Take dos      chlorthalidone 25 MG tablet  Commonly known as: HYGROTON   25 mg, Oral, Daily      multivitamin with minerals tablet tablet   1 tablet, Oral, Daily, Stop 11-9-21 for surgery      nebivolol 10 MG tablet  Commonly known as: BYSTOLIC   10 mg, Oral, Every Morning, Take dos      omeprazole 40 MG capsule  Commonly known as: priLOSEC   40 mg, Oral, Daily      spironolactone 25 MG tablet  Commonly known as: ALDACTONE   1 tablet, Oral, Daily      ursodiol 250 MG tablet  Commonly known as: ACTIGALL   250 mg, Oral, 2 Times Daily             Discharge Diet:   Diet Instructions    Follow the Gastric Stage 1 Diet    à Clear liquids, room temperature, sugar-free, caffeine-free, non-carbonated, 70 grams of protein, No Straws.           Activity at Discharge:     Follow-up  Appointments  Future Appointments   Date Time Provider Department Center   12/10/2021  1:45 PM Lyn Gan MD [facility] NA None   1/5/2022  1:30 PM Claudia Regan APRN [facility] JUDIT None   3/16/2022  9:30 AM Sisi Gaviria MD [facility]         Test Results Pending at Discharge       Lyn Gan MD  12/07/21  12:34 EST    Time:

## 2021-12-08 ENCOUNTER — TELEPHONE (OUTPATIENT)
Dept: BARIATRICS/WEIGHT MGMT | Facility: CLINIC | Age: 41
End: 2021-12-08

## 2021-12-08 RX ORDER — SULFAMETHOXAZOLE AND TRIMETHOPRIM 800; 160 MG/1; MG/1
1 TABLET ORAL 2 TIMES DAILY
Qty: 14 TABLET | Refills: 0 | Status: SHIPPED | OUTPATIENT
Start: 2021-12-08 | End: 2021-12-08

## 2021-12-08 RX ORDER — SULFAMETHOXAZOLE AND TRIMETHOPRIM 800; 160 MG/1; MG/1
1 TABLET ORAL 2 TIMES DAILY
Qty: 14 TABLET | Refills: 0 | Status: SHIPPED | OUTPATIENT
Start: 2021-12-08 | End: 2022-01-14

## 2021-12-08 NOTE — TELEPHONE ENCOUNTER
Gave verbal to Nadir for Potassium Chloride 20 Meq #14 bid x7days with 2 rfs.  And verified patient could  bactrim.  12/8/21 MN

## 2021-12-10 ENCOUNTER — OFFICE VISIT (OUTPATIENT)
Dept: BARIATRICS/WEIGHT MGMT | Facility: CLINIC | Age: 41
End: 2021-12-10

## 2021-12-10 VITALS
BODY MASS INDEX: 47.09 KG/M2 | OXYGEN SATURATION: 97 % | DIASTOLIC BLOOD PRESSURE: 89 MMHG | WEIGHT: 293 LBS | SYSTOLIC BLOOD PRESSURE: 140 MMHG | RESPIRATION RATE: 16 BRPM | HEIGHT: 66 IN | HEART RATE: 84 BPM

## 2021-12-10 DIAGNOSIS — Z51.89 VISIT FOR WOUND CHECK: ICD-10-CM

## 2021-12-10 PROCEDURE — 99024 POSTOP FOLLOW-UP VISIT: CPT | Performed by: NURSE PRACTITIONER

## 2021-12-10 NOTE — H&P (VIEW-ONLY)
[facility] BARIATRIC SURGERY  [address] [place] [address]  [address] [place] [address]  Dept: [phone]  12/10/2021      Pentecostal G Gorge.  [id number]  [id number]  1980  female    1 week gastric sleeve follow up, follow up on  Bactrim      Post-Op Bariatric Surgery:     HPI:   Weight loss in the last week:    Wt Readings from Last 10 Encounters:   12/06/21 (!) 180 kg (396 lb 6.4 oz)   12/02/21 (!) 181 kg (398 lb 12.8 oz)   10/29/21 (!) 198 kg (435 lb 12.8 oz)   04/21/21 (!) 184 kg (406 lb)   03/10/21 (!) 184 kg (406 lb)   02/26/21 (!) 184 kg (406 lb 8 oz)   02/18/21 (!) 182 kg (401 lb 3.8 oz)   01/04/21 (!) 174 kg (382 lb 11.5 oz)   11/19/20 (!) 171 kg (376 lb)   11/12/20 (!) 170 kg (375 lb)       Review of Systems   Constitutional: Positive for activity change, appetite change and fatigue.   HENT:        Phlegm in back of throat, no cough or fever    Respiratory: Negative.    Cardiovascular: Negative.    Gastrointestinal:        Far right abdominal incisional pain with movement, improved since surgery   Musculoskeletal: Positive for arthralgias, back pain and myalgias.     Getting 64 oz of fluids in a day  No longer on home o2  Has had a bowel movement since surgery  No nausea or vomiting  Minimal abdominal pain and only at far right incision with movement    Patient Active Problem List   Diagnosis   • Morbid obesity (HCC)   • Advanced maternal age (AMA) in pregnancy   • Chronic hypertension in pregnancy   • History of laparoscopic adjustable gastric banding   • Twin pregnancy in second trimester   • BMI 50.0-59.9, adult (HCC)   • Pre-operative examination   • GERD (gastroesophageal reflux disease)   • Iron deficiency anemia   • Iron malabsorption   • Anxiety   • Vaginal bleeding   • Anemia   • Depressive disorder   • Edema   • Endometrial hyperplasia   • Hypertension   • Shortness of breath   • Obesity, Class III, BMI  40-49.9 (morbid obesity) (HCC)       The following portions of the patient's history were reviewed and updated as appropriate: allergies, current medications, past family history, past medical history, past social history, past surgical history and problem list.    Vitals:    12/10/21 1310   Pulse: 84   Resp: 16   SpO2: 97%       Physical Exam  Constitutional:       Appearance: Normal appearance. She is obese.   Cardiovascular:      Rate and Rhythm: Normal rate and regular rhythm.   Pulmonary:      Effort: Pulmonary effort is normal.      Breath sounds: Normal breath sounds.   Abdominal:      General: Abdomen is flat. Bowel sounds are normal.      Palpations: Abdomen is soft.      Comments: Incisions clean/ dry and intact/ erythema that was present at 1 week apt has resolved.    Skin:     General: Skin is warm and dry.   Neurological:      General: No focal deficit present.      Mental Status: She is alert and oriented to person, place, and time.   Psychiatric:         Mood and Affect: Mood normal.         Behavior: Behavior normal.         Thought Content: Thought content normal.         Judgment: Judgment normal.         Assessment:   Post-op, the patient is doing well. Her shortness of breath and breathing have improved. Her oxygen sat while in the office today was 97% on room air with a mask on. Her incision sites were clean/ dry and intact. The erythema that was present at her 1 week apt has resolved. She is currently finishing her bactrim abx,. She is doing well with po fluids. Diet progression reviewed with pt today and handout given. Plan for pt to finish abx and follow up at 1 month apt. Pt to call the office with any fever/ shortness of breath/ tachycardia/ uncontrolled htn. Nausea/ vomiting/ increased abdominal pain, drainage from incision sites, erythema or induration. Pt verbalized understanding.     Plan:   Reviewed with patient the importance of following the manual for diet progression. Increase  activity as tolerated. Continue increasing daily intake of protein and water.   Return to work: the patient is to return to 3 weeks from their surgery date with no restrictions unless they develop medical problems in which we will see them back in the office. They received a note in our office today with their return to work date.  Activity restrictions: no lifting, pushing or pulling over 25lbs for 3 weeks.   Recommended patient be sure to get at least 70 grams of protein per day. Discussed with the patient the recommended amount of water per day to intake. Reviewed vitamin requirements. Be sure to do routine exercise and increase activity as tolerated. No asa, nsaids or steroids for 8 weeks. Patient may use miralax as needed if necessary.     Instructions / Recommendations: dietary counseling recommended, recommended a daily protein intake of  grams, vitamin supplement(s) recommended, recommended exercising at least 150 minutes per week, behavior modifications recommended and instructed to call the office for concerns, questions, or problems.     The patient was instructed to follow up at one month follow up appt.     The patient was counseled regarding post op bariatric manual    CALE Fish  [facility] Bariatrics and General surgery

## 2021-12-10 NOTE — PROGRESS NOTES
[facility] BARIATRIC SURGERY  [address] [place] [address]  [address] [place] [address]  Dept: [phone]  12/10/2021      Taoism G Gorge.  [id number]  [phone]  1980  female    1 week gastric sleeve follow up, follow up on  Bactrim      Post-Op Bariatric Surgery:     HPI:   Weight loss in the last week:    Wt Readings from Last 10 Encounters:   12/06/21 (!) 180 kg (396 lb 6.4 oz)   12/02/21 (!) 181 kg (398 lb 12.8 oz)   10/29/21 (!) 198 kg (435 lb 12.8 oz)   04/21/21 (!) 184 kg (406 lb)   03/10/21 (!) 184 kg (406 lb)   02/26/21 (!) 184 kg (406 lb 8 oz)   02/18/21 (!) 182 kg (401 lb 3.8 oz)   01/04/21 (!) 174 kg (382 lb 11.5 oz)   11/19/20 (!) 171 kg (376 lb)   11/12/20 (!) 170 kg (375 lb)       Review of Systems   Constitutional: Positive for activity change, appetite change and fatigue.   HENT:        Phlegm in back of throat, no cough or fever    Respiratory: Negative.    Cardiovascular: Negative.    Gastrointestinal:        Far right abdominal incisional pain with movement, improved since surgery   Musculoskeletal: Positive for arthralgias, back pain and myalgias.     Getting 64 oz of fluids in a day  No longer on home o2  Has had a bowel movement since surgery  No nausea or vomiting  Minimal abdominal pain and only at far right incision with movement    Patient Active Problem List   Diagnosis   • Morbid obesity (HCC)   • Advanced maternal age (AMA) in pregnancy   • Chronic hypertension in pregnancy   • History of laparoscopic adjustable gastric banding   • Twin pregnancy in second trimester   • BMI 50.0-59.9, adult (HCC)   • Pre-operative examination   • GERD (gastroesophageal reflux disease)   • Iron deficiency anemia   • Iron malabsorption   • Anxiety   • Vaginal bleeding   • Anemia   • Depressive disorder   • Edema   • Endometrial hyperplasia   • Hypertension   • Shortness of breath   • Obesity, Class III, BMI  40-49.9 (morbid obesity) (HCC)       The following portions of the patient's history were reviewed and updated as appropriate: allergies, current medications, past family history, past medical history, past social history, past surgical history and problem list.    Vitals:    12/10/21 1310   Pulse: 84   Resp: 16   SpO2: 97%       Physical Exam  Constitutional:       Appearance: Normal appearance. She is obese.   Cardiovascular:      Rate and Rhythm: Normal rate and regular rhythm.   Pulmonary:      Effort: Pulmonary effort is normal.      Breath sounds: Normal breath sounds.   Abdominal:      General: Abdomen is flat. Bowel sounds are normal.      Palpations: Abdomen is soft.      Comments: Incisions clean/ dry and intact/ erythema that was present at 1 week apt has resolved.    Skin:     General: Skin is warm and dry.   Neurological:      General: No focal deficit present.      Mental Status: She is alert and oriented to person, place, and time.   Psychiatric:         Mood and Affect: Mood normal.         Behavior: Behavior normal.         Thought Content: Thought content normal.         Judgment: Judgment normal.         Assessment:   Post-op, the patient is doing well. Her shortness of breath and breathing have improved. Her oxygen sat while in the office today was 97% on room air with a mask on. Her incision sites were clean/ dry and intact. The erythema that was present at her 1 week apt has resolved. She is currently finishing her bactrim abx,. She is doing well with po fluids. Diet progression reviewed with pt today and handout given. Plan for pt to finish abx and follow up at 1 month apt. Pt to call the office with any fever/ shortness of breath/ tachycardia/ uncontrolled htn. Nausea/ vomiting/ increased abdominal pain, drainage from incision sites, erythema or induration. Pt verbalized understanding.     Plan:   Reviewed with patient the importance of following the manual for diet progression. Increase  activity as tolerated. Continue increasing daily intake of protein and water.   Return to work: the patient is to return to 3 weeks from their surgery date with no restrictions unless they develop medical problems in which we will see them back in the office. They received a note in our office today with their return to work date.  Activity restrictions: no lifting, pushing or pulling over 25lbs for 3 weeks.   Recommended patient be sure to get at least 70 grams of protein per day. Discussed with the patient the recommended amount of water per day to intake. Reviewed vitamin requirements. Be sure to do routine exercise and increase activity as tolerated. No asa, nsaids or steroids for 8 weeks. Patient may use miralax as needed if necessary.     Instructions / Recommendations: dietary counseling recommended, recommended a daily protein intake of  grams, vitamin supplement(s) recommended, recommended exercising at least 150 minutes per week, behavior modifications recommended and instructed to call the office for concerns, questions, or problems.     The patient was instructed to follow up at one month follow up appt.     The patient was counseled regarding post op bariatric manual    CALE Fish  [facility] Bariatrics and General surgery

## 2021-12-14 ENCOUNTER — APPOINTMENT (OUTPATIENT)
Dept: CT IMAGING | Facility: HOSPITAL | Age: 41
End: 2021-12-14

## 2021-12-14 ENCOUNTER — HOSPITAL ENCOUNTER (OUTPATIENT)
Facility: HOSPITAL | Age: 41
Setting detail: OBSERVATION
Discharge: HOME | End: 2021-12-15
Attending: SURGERY | Admitting: SURGERY

## 2021-12-14 DIAGNOSIS — L76.32 POSTOPERATIVE HEMATOMA OF SKIN FOLLOWING NON-DERMATOLOGIC PROCEDURE: Primary | ICD-10-CM

## 2021-12-14 DIAGNOSIS — R10.30 LOWER ABDOMINAL PAIN: ICD-10-CM

## 2021-12-14 LAB
ABO GROUP BLD: NORMAL
ALBUMIN SERPL-MCNC: 3.9 G/DL (ref 3.5–5.2)
ALBUMIN/GLOB SERPL: 1.1 G/DL
ALP SERPL-CCNC: 116 U/L (ref 39–117)
ALT SERPL W P-5'-P-CCNC: 31 U/L (ref 1–33)
ANION GAP SERPL CALCULATED.3IONS-SCNC: 14 MMOL/L (ref 5–15)
AST SERPL-CCNC: 19 U/L (ref 1–32)
BASOPHILS # BLD AUTO: 0.1 10*3/MM3 (ref 0–0.2)
BASOPHILS NFR BLD AUTO: 0.9 % (ref 0–1.5)
BILIRUB SERPL-MCNC: 0.7 MG/DL (ref 0–1.2)
BLD GP AB SCN SERPL QL: NEGATIVE
BUN SERPL-MCNC: 10 MG/DL (ref 6–20)
BUN/CREAT SERPL: 11.2 (ref 7–25)
CALCIUM SPEC-SCNC: 9.3 MG/DL (ref 8.6–10.5)
CHLORIDE SERPL-SCNC: 97 MMOL/L (ref 98–107)
CO2 SERPL-SCNC: 30 MMOL/L (ref 22–29)
CREAT SERPL-MCNC: 0.89 MG/DL (ref 0.57–1)
DEPRECATED RDW RBC AUTO: 43.3 FL (ref 37–54)
EOSINOPHIL # BLD AUTO: 0.2 10*3/MM3 (ref 0–0.4)
EOSINOPHIL NFR BLD AUTO: 3 % (ref 0.3–6.2)
ERYTHROCYTE [DISTWIDTH] IN BLOOD BY AUTOMATED COUNT: 14.7 % (ref 12.3–15.4)
GFR SERPL CREATININE-BSD FRML MDRD: 70 ML/MIN/1.73
GLOBULIN UR ELPH-MCNC: 3.5 GM/DL
GLUCOSE SERPL-MCNC: 107 MG/DL (ref 65–99)
HCT VFR BLD AUTO: 41.3 % (ref 34–46.6)
HGB BLD-MCNC: 14.3 G/DL (ref 12–15.9)
HOLD SPECIMEN: NORMAL
HOLD SPECIMEN: NORMAL
LIPASE SERPL-CCNC: 20 U/L (ref 13–60)
LYMPHOCYTES # BLD AUTO: 1.1 10*3/MM3 (ref 0.7–3.1)
LYMPHOCYTES NFR BLD AUTO: 18.4 % (ref 19.6–45.3)
MCH RBC QN AUTO: 29 PG (ref 26.6–33)
MCHC RBC AUTO-ENTMCNC: 34.6 G/DL (ref 31.5–35.7)
MCV RBC AUTO: 83.7 FL (ref 79–97)
MONOCYTES # BLD AUTO: 0.5 10*3/MM3 (ref 0.1–0.9)
MONOCYTES NFR BLD AUTO: 7.6 % (ref 5–12)
NEUTROPHILS NFR BLD AUTO: 4.4 10*3/MM3 (ref 1.7–7)
NEUTROPHILS NFR BLD AUTO: 70.1 % (ref 42.7–76)
NRBC BLD AUTO-RTO: 0.3 /100 WBC (ref 0–0.2)
PLATELET # BLD AUTO: 309 10*3/MM3 (ref 140–450)
PMV BLD AUTO: 8 FL (ref 6–12)
POTASSIUM SERPL-SCNC: 3.3 MMOL/L (ref 3.5–5.2)
PROT SERPL-MCNC: 7.4 G/DL (ref 6–8.5)
RBC # BLD AUTO: 4.93 10*6/MM3 (ref 3.77–5.28)
RH BLD: POSITIVE
SODIUM SERPL-SCNC: 141 MMOL/L (ref 136–145)
T&S EXPIRATION DATE: NORMAL
WBC NRBC COR # BLD: 6.2 10*3/MM3 (ref 3.4–10.8)
WHOLE BLOOD HOLD SPECIMEN: NORMAL
WHOLE BLOOD HOLD SPECIMEN: NORMAL

## 2021-12-14 PROCEDURE — 86900 BLOOD TYPING SEROLOGIC ABO: CPT | Performed by: NURSE PRACTITIONER

## 2021-12-14 PROCEDURE — 0 MORPHINE SULFATE 4 MG/ML SOLUTION: Performed by: NURSE PRACTITIONER

## 2021-12-14 PROCEDURE — 0 IOPAMIDOL PER 1 ML: Performed by: NURSE PRACTITIONER

## 2021-12-14 PROCEDURE — G0378 HOSPITAL OBSERVATION PER HR: HCPCS

## 2021-12-14 PROCEDURE — 25010000002 ONDANSETRON PER 1 MG: Performed by: SURGERY

## 2021-12-14 PROCEDURE — 36415 COLL VENOUS BLD VENIPUNCTURE: CPT | Performed by: NURSE PRACTITIONER

## 2021-12-14 PROCEDURE — 83690 ASSAY OF LIPASE: CPT | Performed by: NURSE PRACTITIONER

## 2021-12-14 PROCEDURE — 25010000002 ONDANSETRON PER 1 MG: Performed by: NURSE PRACTITIONER

## 2021-12-14 PROCEDURE — 96376 TX/PRO/DX INJ SAME DRUG ADON: CPT

## 2021-12-14 PROCEDURE — 87186 SC STD MICRODIL/AGAR DIL: CPT | Performed by: SURGERY

## 2021-12-14 PROCEDURE — 86901 BLOOD TYPING SEROLOGIC RH(D): CPT | Performed by: NURSE PRACTITIONER

## 2021-12-14 PROCEDURE — 87076 CULTURE ANAEROBE IDENT EACH: CPT | Performed by: SURGERY

## 2021-12-14 PROCEDURE — 96375 TX/PRO/DX INJ NEW DRUG ADDON: CPT

## 2021-12-14 PROCEDURE — 96374 THER/PROPH/DIAG INJ IV PUSH: CPT

## 2021-12-14 PROCEDURE — 80053 COMPREHEN METABOLIC PANEL: CPT | Performed by: NURSE PRACTITIONER

## 2021-12-14 PROCEDURE — 87070 CULTURE OTHR SPECIMN AEROBIC: CPT | Performed by: SURGERY

## 2021-12-14 PROCEDURE — 85025 COMPLETE CBC W/AUTO DIFF WBC: CPT | Performed by: NURSE PRACTITIONER

## 2021-12-14 PROCEDURE — 74178 CT ABD&PLV WO CNTR FLWD CNTR: CPT

## 2021-12-14 PROCEDURE — 87077 CULTURE AEROBIC IDENTIFY: CPT | Performed by: SURGERY

## 2021-12-14 PROCEDURE — 86850 RBC ANTIBODY SCREEN: CPT | Performed by: NURSE PRACTITIONER

## 2021-12-14 PROCEDURE — 87205 SMEAR GRAM STAIN: CPT | Performed by: SURGERY

## 2021-12-14 PROCEDURE — 99284 EMERGENCY DEPT VISIT MOD MDM: CPT

## 2021-12-14 RX ORDER — PANTOPRAZOLE SODIUM 40 MG/10ML
40 INJECTION, POWDER, LYOPHILIZED, FOR SOLUTION INTRAVENOUS ONCE
Status: COMPLETED | OUTPATIENT
Start: 2021-12-14 | End: 2021-12-14

## 2021-12-14 RX ORDER — MORPHINE SULFATE 4 MG/ML
4 INJECTION, SOLUTION INTRAMUSCULAR; INTRAVENOUS ONCE
Status: COMPLETED | OUTPATIENT
Start: 2021-12-14 | End: 2021-12-14

## 2021-12-14 RX ORDER — ONDANSETRON 2 MG/ML
4 INJECTION INTRAMUSCULAR; INTRAVENOUS EVERY 6 HOURS PRN
Status: DISCONTINUED | OUTPATIENT
Start: 2021-12-14 | End: 2021-12-15 | Stop reason: HOSPADM

## 2021-12-14 RX ORDER — SODIUM CHLORIDE 0.9 % (FLUSH) 0.9 %
10 SYRINGE (ML) INJECTION AS NEEDED
Status: DISCONTINUED | OUTPATIENT
Start: 2021-12-14 | End: 2021-12-15 | Stop reason: HOSPADM

## 2021-12-14 RX ORDER — MORPHINE SULFATE 4 MG/ML
4 INJECTION, SOLUTION INTRAMUSCULAR; INTRAVENOUS EVERY 6 HOURS PRN
Status: DISCONTINUED | OUTPATIENT
Start: 2021-12-14 | End: 2021-12-15 | Stop reason: HOSPADM

## 2021-12-14 RX ORDER — ONDANSETRON 2 MG/ML
4 INJECTION INTRAMUSCULAR; INTRAVENOUS ONCE
Status: COMPLETED | OUTPATIENT
Start: 2021-12-14 | End: 2021-12-14

## 2021-12-14 RX ADMIN — ONDANSETRON 4 MG: 2 INJECTION INTRAMUSCULAR; INTRAVENOUS at 21:59

## 2021-12-14 RX ADMIN — IOPAMIDOL 100 ML: 755 INJECTION, SOLUTION INTRAVENOUS at 19:47

## 2021-12-14 RX ADMIN — ONDANSETRON 4 MG: 2 INJECTION INTRAMUSCULAR; INTRAVENOUS at 17:55

## 2021-12-14 RX ADMIN — MORPHINE SULFATE 4 MG: 4 INJECTION INTRAVENOUS at 17:54

## 2021-12-14 RX ADMIN — PANTOPRAZOLE SODIUM 40 MG: 40 INJECTION, POWDER, FOR SOLUTION INTRAVENOUS at 22:33

## 2021-12-14 RX ADMIN — SODIUM CHLORIDE, POTASSIUM CHLORIDE, SODIUM LACTATE AND CALCIUM CHLORIDE 1000 ML: 600; 310; 30; 20 INJECTION, SOLUTION INTRAVENOUS at 17:55

## 2021-12-14 NOTE — ED PROVIDER NOTES
Subjective   41-year-old morbidly obese female presents with complaint of lower abdominal pain and moderate bleeding from her incisional site.  She is status post gastric sleeve surgery from 12/2/2021 per Dr. Gan.  She reports that she had her follow-up appointment 12/10/2021 with his nurse practitioner, Claudia and had Steri-Strips placed.  She had no complications at that time.  She denies associated fever sweats chills.  She reports that she had a normal bowel movement today denies melena hematochezia.  Denies urinary symptoms.  She reports normal p.o. intake.    1. Location: Right lower abdominal incision  2. Quality: Shooting  3. Severity: Moderate  4. Worsening factors: Movement, palpation  5. Alleviating factors: Rest  6. Onset: Prior to arrival  7. Radiation: Denies  8. Frequency: Intermittent  9. Co-morbidities: Past Medical History:  No date: Anxiety and depression  No date: Arthritis  No date: Bipolar affective (HCC)  No date: Constipation  No date: Edema leg  No date: Fibrocystic breast  No date: GERD (gastroesophageal reflux disease)  No date: Hypertension  No date: Morbid obesity (HCC)  10. Source: Patient            Review of Systems   Constitutional: Negative for appetite change, chills, diaphoresis and fever.   Gastrointestinal: Positive for abdominal pain. Negative for blood in stool, constipation, diarrhea, nausea and vomiting.   Genitourinary: Negative for decreased urine volume, difficulty urinating, flank pain and hematuria.   Skin: Positive for wound (Multiple abdominal incisional wounds). Negative for color change, pallor and rash.   Hematological: Negative for adenopathy.   All other systems reviewed and are negative.      Past Medical History:   Diagnosis Date   • Anxiety and depression    • Arthritis    • Bipolar affective (HCC)    • Constipation    • Edema leg    • Fibrocystic breast    • GERD (gastroesophageal reflux disease)    • Hypertension    • Morbid obesity (HCC)        Allergies    Allergen Reactions   • Antihistamines, Diphenhydramine-Type Other (See Comments)     Bloody nose       Past Surgical History:   Procedure Laterality Date   • ABDOMINAL SURGERY     •  SECTION     • COLONOSCOPY N/A 2020    Procedure: COLONOSCOPY to cecum and TI with cold snare polypectomy;  Surgeon: Giovanni Lara MD;  Location: [facility] ENDOSCOPY;  Service: Gastroenterology;  Laterality: N/A;  pre-iron def anemia  post-polyp   • D & C CERVICAL BIOPSY     • D & C HYSTEROSCOPY N/A 2021    Procedure: DILATATION AND CURETTAGE HYSTEROSCOPY;  Surgeon: Power Vasquez MD;  Location: [facility] MAIN OR;  Service: Gynecology;  Laterality: N/A;   • ENDOSCOPY N/A 8/15/2019    Procedure: ESOPHAGOGASTRODUODENOSCOPY W/BIOPSY;  Surgeon: Lyn Gan MD;  Location: [facility] ENDOSCOPY;  Service: General   • ENDOSCOPY N/A 2020    Procedure: ESOPHAGOGASTRODUODENOSCOPY;  Surgeon: Giovanni Lara MD;  Location: [facility] ENDOSCOPY;  Service: Gastroenterology;  Laterality: N/A;  pre-iron def anemia  post-hiatal hernia   • GASTRIC BANDING REMOVAL N/A 10/17/2019    Procedure: GASTRIC BANDING REMOVAL LAPAROSCOPIC;  Surgeon: Lyn Gan MD;  Location: [facility] MAIN OR;  Service: General   • GASTRIC SLEEVE LAPAROSCOPIC N/A 2021    Procedure: GASTRIC SLEEVE LAPAROSCOPIC WITH DAVINCI ROBOT;  Surgeon: Lyn Gan MD;  Location: [facility] MAIN OR;  Service: Robotics - DaVinci;  Laterality: N/A;   • HIATAL HERNIA REPAIR N/A 2021    Procedure: LAPAROSCOPIC HIATAL HERNIA REPAIR WITH DAVINCI ROBOT;  Surgeon: Lyn Gan MD;  Location: [facility] MAIN OR;  Service: Robotics - DaVinci;  Laterality: N/A;   • HYSTERECTOMY     • LAPAROSCOPIC GASTRIC BANDING     • LAPAROSCOPIC LYSIS OF ADHESIONS N/A 2021    Procedure: extensive LAPAROSCOPIC LYSIS OF ADHESIONS;  Surgeon: Lyn Gan MD;  Location: [facility] MAIN OR;  Service: Robotics - DaVinci;  Laterality: N/A;   • LAPAROSCOPIC TUBAL LIGATION         Family  History   Problem Relation Age of Onset   • Hypertension Mother    • Cancer Mother    • Skin cancer Mother    • Hypertension Father    • Heart attack Father    • Hypertension Sister    • Diabetes Sister    • Cancer Maternal Grandmother    • Lung cancer Maternal Grandmother    • Cancer Maternal Grandfather        Social History     Socioeconomic History   • Marital status: Single   Tobacco Use   • Smoking status: Former Smoker     Quit date:      Years since quittin.9   • Smokeless tobacco: Never Used   Vaping Use   • Vaping Use: Never used   Substance and Sexual Activity   • Alcohol use: Yes     Alcohol/week: 2.0 standard drinks     Types: 2 Glasses of wine per week     Comment: 2 drinks per week, social    • Drug use: No   • Sexual activity: Defer           Objective   Physical Exam  Vitals and nursing note reviewed.   Constitutional:       General: She is awake. She is not in acute distress.     Appearance: Normal appearance. She is well-developed. She is morbidly obese. She is not ill-appearing or toxic-appearing.   Eyes:      General: No scleral icterus.  Cardiovascular:      Rate and Rhythm: Normal rate and regular rhythm.      Pulses: Normal pulses.      Heart sounds: Normal heart sounds, S1 normal and S2 normal. Heart sounds not distant. No murmur heard.  No friction rub. No gallop.    Pulmonary:      Effort: Pulmonary effort is normal.      Breath sounds: Normal breath sounds and air entry.   Abdominal:      General: Abdomen is protuberant. Bowel sounds are normal. There is no distension.      Palpations: Abdomen is soft. There is no mass.      Tenderness: There is abdominal tenderness in the right lower quadrant. There is no right CVA tenderness, left CVA tenderness, guarding or rebound.      Hernia: No hernia is present.       Skin:     General: Skin is warm and dry.      Capillary Refill: Capillary refill takes less than 2 seconds.      Coloration: Skin is not jaundiced or pale.      Findings: No  rash.   Neurological:      Mental Status: She is alert and oriented to person, place, and time.   Psychiatric:         Mood and Affect: Mood normal.         Behavior: Behavior normal. Behavior is cooperative.         Thought Content: Thought content normal.         Judgment: Judgment normal.         Procedures           ED Course  ED Course as of 12/14/21 2153 Tue Dec 14, 2021   1936 Awaiting CT results. [AL]      ED Course User Index  [AL] Clarissa Flores, CALE      CT Abd With & Without Contrast Pelvis With Contrast    Result Date: 12/14/2021  1.There is an ill-defined high density nonenhancing subcutaneous collection in the right lower abdomen that is likely related patient's draining wound. A hematoma is most likely. No intraperitoneal extension. 2.There is a small hematoma in the left lower quadrant. 3.There are findings of gastric sleeve procedure without significant intraperitoneal abscess or extravasation or leak of contrast.  Electronically Signed By-Savannah Tamez MD On:12/14/2021 8:00 PM This report was finalized on [id number] by  Savannah Tamez MD.    Medications   sodium chloride 0.9 % flush 10 mL (has no administration in time range)   Pharmacy Consult - Pharmacy to dose (has no administration in time range)   Morphine sulfate (PF) injection 4 mg (has no administration in time range)   ondansetron (ZOFRAN) injection 4 mg (has no administration in time range)   lactated ringers bolus 1,000 mL (0 mL Intravenous Stopped 12/14/21 1900)   ondansetron (ZOFRAN) injection 4 mg (4 mg Intravenous Given 12/14/21 1755)   Morphine sulfate (PF) injection 4 mg (4 mg Intravenous Given 12/14/21 1754)   iopamidol (ISOVUE-370) 76 % injection 100 mL (100 mL Intravenous Given 12/14/21 1947)     Labs Reviewed   COMPREHENSIVE METABOLIC PANEL - Abnormal; Notable for the following components:       Result Value    Glucose 107 (*)     Potassium 3.3 (*)     Chloride 97 (*)     CO2 30.0 (*)     All other components within normal  limits    Narrative:     GFR Normal >60  Chronic Kidney Disease <60  Kidney Failure <15     CBC WITH AUTO DIFFERENTIAL - Abnormal; Notable for the following components:    Lymphocyte % 18.4 (*)     nRBC 0.3 (*)     All other components within normal limits   LIPASE - Normal   WOUND CULTURE   RAINBOW DRAW    Narrative:     The following orders were created for panel order [place] Draw.  Procedure                               Abnormality         Status                     ---------                               -----------         ------                     Green Top (Gel)[[id number]]                                  Final result               Lavender Top[[id number]]                                     Final result               Gold Top - SST[[id number]]                                   Final result               Light Blue Top[[id number]]                                   Final result                 Please view results for these tests on the individual orders.   URINALYSIS W/ MICROSCOPIC IF INDICATED (NO CULTURE)   TYPE AND SCREEN   BB ARMBAND CHECK   CBC AND DIFFERENTIAL    Narrative:     The following orders were created for panel order CBC & Differential.  Procedure                               Abnormality         Status                     ---------                               -----------         ------                     CBC Auto Differential[[id number]]        Abnormal            Final result                 Please view results for these tests on the individual orders.   GREEN TOP   LAVENDER TOP   GOLD TOP - SST   LIGHT BLUE TOP                                                MDM  Number of Diagnoses or Management Options  Lower abdominal pain  Postoperative hematoma of skin following non-dermatologic procedure  Diagnosis management comments: Chart Review: 12/10/2021 patient was seen for postop follow-up by PRABHA Taylor with Dr. Gan's office.  Comorbidity: Past Medical History:  No date: Anxiety and  depression  No date: Arthritis  No date: Bipolar affective (HCC)  No date: Constipation  No date: Edema leg  No date: Fibrocystic breast  No date: GERD (gastroesophageal reflux disease)  No date: Hypertension  No date: Morbid obesity (HCC)  Imaging: Was interpreted by physician and reviewed by myself: CT Abd With & Without Contrast Pelvis With Contrast   Final Result    1.There is an ill-defined high density nonenhancing subcutaneous    collection in the right lower abdomen that is likely related patient's    draining wound. A hematoma is most likely. No intraperitoneal extension.    2.There is a small hematoma in the left lower quadrant.    3.There are findings of gastric sleeve procedure without significant    intraperitoneal abscess or extravasation or leak of contrast.         Electronically Signed By-Savannah Tamez MD On:12/14/2021 8:00 PM    This report was finalized on [id number] by  Savannah Tamez MD.    Patient undressed and placed in gown for exam.  Appropriate PPE worn during patient exam. 41-year-old morbidly obese female presents with complaint of lower abdominal pain and moderate bleeding from her incisional site.  She is status post gastric sleeve surgery from 12/2/2021 per Dr. Gan.  She reports that she had her follow-up appointment 12/10/2021 with his nurse practitioner, Claudia and had Steri-Strips placed.  She had no complications at that time.  She denies associated fever sweats chills.  She reports that she had a normal bowel movement today denies melena hematochezia.  Denies urinary symptoms.  She reports normal p.o. intake.  Patient is noted to have small amount of dark red drainage from right lower incisional site.  The surrounding skin is noted to be slightly erythematous.  Is tender to palpation.  IV established and labs obtained.  Abdominal protocol initiated.  CT abdomen pelvis bariatric protocol initiated with p.o. and IV contrast with the above findings.  Lab work-up is essentially  unremarkable.  CT was significant for large hematoma under the incisional site.  Spoke with Dr. Gan who would like patient admitted to him.  He plans to perform bedside procedure in the morning.    Disposition/Treatment: Discussed results with patient, verbalized understanding.  Discussed reasons to return to the ER, patient verbalized understanding.  Agreeable with plan of care.  Patient was stable upon discharge.       Part of this note may be an electronic transcription/translation of spoken language to printed text using the Dragon Dictation System.            Amount and/or Complexity of Data Reviewed  Discuss the patient with other providers: (Discussed this case with Dr. Gan.  He would like patient to be admitted to himself.  He would like her Lovenox to be held and she can have a full liquid diet until midnight.  He would like he wound culture to be obtained.  He plans to do a bedside procedure tomorrow morning.)    Patient Progress  Patient progress: stable      Final diagnoses:   Postoperative hematoma of skin following non-dermatologic procedure   Lower abdominal pain       ED Disposition  ED Disposition     ED Disposition Condition Comment    Decision to Admit  Level of Care: Med/Surg [1]   Admitting Physician: MARIA FERNANDA GAN [[id number]]   Attending Physician: MARIA FERNANDA GAN [[id number]]            No follow-up provider specified.       Medication List      No changes were made to your prescriptions during this visit.          Clarissa Flores, APRN  12/14/21 4208

## 2021-12-14 NOTE — ED NOTES
Pt started bleeding from her post-op site today. Pt had gastric sleeve 2 weeks ago.     Vaishnavi Richardson, RN  12/14/21 0904

## 2021-12-15 ENCOUNTER — READMISSION MANAGEMENT (OUTPATIENT)
Dept: CALL CENTER | Facility: HOSPITAL | Age: 41
End: 2021-12-15

## 2021-12-15 VITALS
HEART RATE: 65 BPM | TEMPERATURE: 97.5 F | BODY MASS INDEX: 47.09 KG/M2 | WEIGHT: 293 LBS | OXYGEN SATURATION: 94 % | DIASTOLIC BLOOD PRESSURE: 84 MMHG | RESPIRATION RATE: 17 BRPM | HEIGHT: 66 IN | SYSTOLIC BLOOD PRESSURE: 123 MMHG

## 2021-12-15 PROCEDURE — G0378 HOSPITAL OBSERVATION PER HR: HCPCS

## 2021-12-15 PROCEDURE — 99024 POSTOP FOLLOW-UP VISIT: CPT | Performed by: SURGERY

## 2021-12-15 NOTE — INTERVAL H&P NOTE
H&P updated. The patient was examined and the following changes are noted:        Patient had some bleeding from her incision and came to the ER.  CT scan demonstrated hematoma.  She was admitted for further evaluation.

## 2021-12-15 NOTE — DISCHARGE INSTRUCTIONS
Stop taking Lovenox altogether.  I know we talked about going to once a day for a couple more days but I have changed my mind I decided that you can just stop the Lovenox.  Please continue to ambulate frequently.    Keep a dressing on your wound and change it every day.  Wear an abdominal binder.  Follow-up at your next scheduled appointment.  Leave Steri-Strips on until they fall off.  If they become saturated with blood area have another event like this please call the office and we can see you in the office to further evaluate.

## 2021-12-15 NOTE — CASE MANAGEMENT/SOCIAL WORK
Discharge Planning Assessment   [place]     Patient Name: Kyle Pennington  MRN: [id number]  Today's Date: 12/15/2021    Admit Date: 12/14/2021     Discharge Needs Assessment     Row Name 12/15/21 1536       Living Environment    Lives With child(liliam), dependent    Current Living Arrangements home/apartment/condo    Primary Care Provided by self    Provides Primary Care For no one    Family Caregiver if Needed none    Able to Return to Prior Arrangements yes       Resource/Environmental Concerns    Transportation Concerns car, none       Transition Planning    Patient/Family Anticipates Transition to home    Patient/Family Anticipated Services at Transition none    Transportation Anticipated family or friend will provide       Discharge Needs Assessment    Equipment Currently Used at Home none    Concerns to be Addressed denies needs/concerns at this time    Anticipated Changes Related to Illness none    Equipment Needed After Discharge none    Provided Post Acute Provider List? N/A    N/A Provider List Comment denies dc needs               Discharge Plan     Row Name 12/15/21 1538       Plan    Plan Home with family    Plan Comments Spoke with patient at bedside.  confirmed pcp and pharmacy.  Denies dc needs              Continued Care and Services - Discharged on 12/15/2021 Admission date: 12/14/2021 - Discharge disposition: Home or Self Care   Coordination has not been started for this encounter.       Expected Discharge Date and Time     Expected Discharge Date Expected Discharge Time    Dec 15, 2021          Demographic Summary     Row Name 12/15/21 1536       General Information    Admission Type observation    Arrived From emergency department    Referral Source admission list    Reason for Consult discharge planning    Preferred Language English               Functional Status     Row Name 12/15/21 1536       Functional Status    Usual Activity Tolerance good    Current Activity Tolerance good        Functional Status, IADL    Medications independent    Meal Preparation independent    Housekeeping independent    Laundry independent    Shopping independent       Mental Status    General Appearance WDL WDL       Mental Status Summary    Recent Changes in Mental Status/Cognitive Functioning no changes                         Georgina Edward RN   Met with patient in room wearing PPE: mask, face shield/goggles, gloves, gown.      Maintained distance greater than six feet and spent less than 15 minutes in the room.

## 2021-12-15 NOTE — DISCHARGE SUMMARY
Date of Discharge:  12/15/2021    Discharge Diagnosis: hematoma of incision    Presenting Problem/History of Present Illness  There are no hospital problems to display for this patient.          Hospital Course  Patient is a 41 y.o. female presented with bleeding from her right lower quadrant laparoscopic incision.  CT scan demonstrated hematoma.  Upon further evaluation I felt that we could just reapply Steri-Strips and follow-up as needed..      Procedures Performed         Consults:   Consults     Date and Time Order Name Status Description    12/14/2021  8:50 PM Surgery (on-call MD unless specified)            Pertinent Test Results: CT scan hematoms    Condition on Discharge:  good    Vital Signs  Temp:  [97.6 °F (36.4 °C)-98.7 °F (37.1 °C)] 98 °F (36.7 °C)  Heart Rate:  [67-96] 67  Resp:  [15-18] 16  BP: ()/(68-96) 139/85    Physical Exam:     General Appearance:    Alert, cooperative, in no acute distress   Head:    Normocephalic, without obvious abnormality, atraumatic   Eyes:            Lids and lashes normal, conjunctivae and sclerae normal, no   icterus, no pallor, corneas clear, PERRLA   Ears:    Ears appear intact with no abnormalities noted   Throat:   No oral lesions, no thrush, oral mucosa moist   Neck:   No adenopathy, supple, trachea midline, no thyromegaly, no     carotid bruit, no JVD   Back:     No kyphosis present, no scoliosis present, no skin lesions,       erythema or scars, no tenderness to percussion or                   palpation,   range of motion normal   Lungs:     Clear to auscultation,respirations regular, even and                   unlabored    Heart:    Regular rhythm and normal rate, normal S1 and S2, no            murmur, no gallop, no rub, no click   Breast Exam:    Deferred   Abdomen:    In the right lower quadrant there is a 1-1/2 inch transverse incision and a small quarter inch opening at the medial aspect that is not actively bleeding.  There is no erythema.    Genitalia:    Deferred   Extremities:   Moves all extremities well, no edema, no cyanosis, no              redness   Pulses:   Pulses palpable and equal bilaterally   Skin:   No bleeding, bruising or rash   Lymph nodes:   No palpable adenopathy   Neurologic:   Cranial nerves 2 - 12 grossly intact, sensation intact, DTR        present and equal bilaterally       Discharge Disposition      Discharge Medications     Discharge Medications      ASK your doctor about these medications      Instructions Start Date   buPROPion  MG 24 hr tablet  Commonly known as: WELLBUTRIN XL   150 mg, Oral, Every Morning, Take dos      chlorthalidone 25 MG tablet  Commonly known as: HYGROTON   25 mg, Oral, Daily      enoxaparin 40 MG/0.4ML solution syringe  Commonly known as: Lovenox   40 mg, Subcutaneous, Every 12 Hours Scheduled      HYDROmorphone 2 MG tablet  Commonly known as: Dilaudid   2 mg, Oral, Every 6 Hours PRN      multivitamin with minerals tablet tablet   1 tablet, Oral, Daily, Stop 11-9-21 for surgery      nebivolol 10 MG tablet  Commonly known as: BYSTOLIC   10 mg, Oral, Every Morning, Take dos      omeprazole 40 MG capsule  Commonly known as: priLOSEC   40 mg, Oral, Daily      ondansetron ODT 8 MG disintegrating tablet  Commonly known as: Zofran ODT   8 mg, Translingual, Every 8 Hours PRN      potassium chloride 20 MEQ CR tablet  Commonly known as: K-DUR,KLOR-CON  Ask about: Should I take this medication?   20 mEq, Oral, 2 Times Daily      spironolactone 25 MG tablet  Commonly known as: ALDACTONE   1 tablet, Oral, Daily      sulfamethoxazole-trimethoprim 800-160 MG per tablet  Commonly known as: Bactrim DS   1 tablet, Oral, 2 Times Daily      ursodiol 250 MG tablet  Commonly known as: ACTIGALL   250 mg, Oral, 2 Times Daily             Discharge Diet: full liquids    Activity at Discharge: no lifting    Follow-up Appointments  Future Appointments   Date Time Provider Department Center   1/5/2022  1:30 PM Shereen  [facility] None   3/16/2022  9:30 AM Sisi Gaviria MD [facility]         Test Results Pending at Discharge  Pending Labs     Order Current Status    Wound Culture - Swab, Abdominal Wall Preliminary result           Lyn Gan MD  12/15/21  06:42 EST    Time:

## 2021-12-15 NOTE — PLAN OF CARE
Goal Outcome Evaluation:              Outcome Summary: pt doing well and going to discharge today.

## 2021-12-16 NOTE — OUTREACH NOTE
Prep Survey      Responses   [facility] facility patient discharged from? [place]   Is LACE score < 7 ? Yes   Emergency Room discharge w/ pulse ox? No   Eligibility Not Eligible   What are the reasons patient is not eligible? Other   Does the patient have one of the following disease processes/diagnoses(primary or secondary)? Other   Prep survey completed? Yes          Mary Frank RN

## 2021-12-17 LAB
BACTERIA SPEC AEROBE CULT: ABNORMAL
GRAM STN SPEC: ABNORMAL

## 2022-01-05 ENCOUNTER — TELEMEDICINE (OUTPATIENT)
Dept: BARIATRICS/WEIGHT MGMT | Facility: CLINIC | Age: 42
End: 2022-01-05

## 2022-01-05 DIAGNOSIS — K21.9 GASTROESOPHAGEAL REFLUX DISEASE WITHOUT ESOPHAGITIS: ICD-10-CM

## 2022-01-05 PROCEDURE — 99024 POSTOP FOLLOW-UP VISIT: CPT | Performed by: NURSE PRACTITIONER

## 2022-01-05 RX ORDER — OMEPRAZOLE 40 MG/1
40 CAPSULE, DELAYED RELEASE ORAL DAILY
Qty: 30 CAPSULE | Refills: 6 | Status: SHIPPED | OUTPATIENT
Start: 2022-01-05 | End: 2022-06-14 | Stop reason: SDUPTHER

## 2022-01-05 NOTE — PROGRESS NOTES
[facility]K BAR SURG [facility] BARIATRIC SURGERY  [address] [place] [address]  [address] [place] [address]  Dept: [phone]  1/5/2022      Kyle Pennington.  [id number]  [id number]  1980  female    You have chosen to receive care through a video visit. Do you consent to use a video visit for your medical care today? Yes      1 month gastric sleeve follow up       379.2 pounds     BH Post-Op Bariatric Surgery:   Kyle Pennington is status post procedure listed above  HPI:     Wt Readings from Last 10 Encounters:   12/14/21 (!) 177 kg (390 lb 7 oz)   12/10/21 (!) 177 kg (390 lb 3.2 oz)   12/06/21 (!) 180 kg (396 lb 6.4 oz)   12/02/21 (!) 181 kg (398 lb 12.8 oz)   10/29/21 (!) 198 kg (435 lb 12.8 oz)   04/21/21 (!) 184 kg (406 lb)   03/10/21 (!) 184 kg (406 lb)   02/26/21 (!) 184 kg (406 lb 8 oz)   02/18/21 (!) 182 kg (401 lb 3.8 oz)   01/04/21 (!) 174 kg (382 lb 11.5 oz)        Today's weight is 379.2    pounds, today's BMI is ,@ has a  loss of 11 pounds since the last visit and@ weight loss since surgery is 19 pounds. The patient reports a decreased portion size and loss of appetite.      Kyle Pennington is having some nausea/ vomiting with protein shakes, having some GERD- improved with omeprazole      Diet and Exercise: Diet history reviewed and discussed with the patient. Weight loss/gains to date discussed with the patient. The patient states they are eating 40 grams of protein per day. She reports eating 3 meals per day, a typical portion size of 1/2 cup, eating 0 snacks per day, drinking 8 or more 8-oz. glasses of water per day, no carbonated beverage consumption and exercising regularly.       Breakfast: oatmeal ,turkey wright,   Lunch: fish, tuna, chicken, turkey burger , canned corn, mixed veggies   Dinner: similar to lunch   Snacks: none   Drinks: water         Supplements: none.       Review of Systems   Constitutional: Positive for  activity change, appetite change and fatigue.   Respiratory: Negative.    Cardiovascular: Negative.    Gastrointestinal: Positive for nausea and vomiting.   Musculoskeletal: Positive for arthralgias, back pain and myalgias.       Patient Active Problem List   Diagnosis   • Morbid obesity (HCC)   • Advanced maternal age (AMA) in pregnancy   • Chronic hypertension in pregnancy   • History of laparoscopic adjustable gastric banding   • Twin pregnancy in second trimester   • BMI 50.0-59.9, adult ([facility])   • Pre-operative examination   • GERD (gastroesophageal reflux disease)   • Iron deficiency anemia   • Iron malabsorption   • Anxiety   • Vaginal bleeding   • Anemia   • Depressive disorder   • Edema   • Endometrial hyperplasia   • Hypertension   • Shortness of breath   • Obesity, Class III, BMI 40-49.9 (morbid obesity) ([facility])       Past Medical History:   Diagnosis Date   • Anxiety and depression    • Arthritis    • Bipolar affective ([facility])    • Constipation    • Edema leg    • Fibrocystic breast    • GERD (gastroesophageal reflux disease)    • Hypertension    • Morbid obesity ([facility])        The following portions of the patient's history were reviewed and updated as appropriate: allergies, current medications, past family history, past medical history, past social history, past surgical history and problem list.    Height 66 inches, weight 379.2 pounds, BMI 61.20  Physical Exam  Constitutional:       Appearance: Normal appearance. She is obese.   Cardiovascular:      Rate and Rhythm: Normal rate and regular rhythm.   Pulmonary:      Effort: Pulmonary effort is normal.      Breath sounds: Normal breath sounds.   Abdominal:      General: Abdomen is flat. Bowel sounds are normal.      Palpations: Abdomen is soft.   Skin:     General: Skin is warm and dry.   Neurological:      General: No focal deficit present.      Mental Status: She is alert and oriented to person, place, and time.   Psychiatric:         Mood and Affect: Mood  normal.         Behavior: Behavior normal.         Thought Content: Thought content normal.         Judgment: Judgment normal.           Assessment:   Post-op, the patient is doing well with weight loss. She is having some nausea/ vomiting with certain foods including protein shakes. I spoke with her at length about trying genpro and drinking slowly and trying more room temperature fluids. I also spoke with her about trying to chew her food well and taking small bites. Encouraged pt to stay on the mushy stage a little longer until she is more comfortable with certain foods. Ok to start exercising. Will check 1 month labs. Pt to call the office if nausea/ vomiting worsens or if she starts having abdominal pain.     Plan:     Encouraged patient to be sure to get plenty of lean protein per day through small frequent meals all with a protein source.   Activity restrictions: none.   Recommended patient be sure to get at least 70 grams of protein per day by eating small, frequent meals all with high lean protein choices. Be sure to limit/cut back on daily carbohydrate intake. Discussed with the patient the recommended amount of water per day to intake- half of body weight in ounces. Reviewed vitamin requirements. Be sure to do routine exercise, 150 minutes per week minimum, including both cardio and strength training.     Instructions / Recommendations: dietary counseling recommended, recommended a daily protein intake of  grams, vitamin supplement(s) recommended, recommended exercising at least 150 minutes per week, behavior modifications recommended and instructed to call the office for concerns, questions, or problems.     The patient was instructed to follow up in 2 months .     The patient was counseled regarding. Total time spent face to face was 30 minutes and 25 minutes was spent counseling.     CALE Fish  [facility] Bariatrics and General Surgery

## 2022-01-06 VITALS — HEIGHT: 66 IN | BODY MASS INDEX: 47.09 KG/M2 | WEIGHT: 293 LBS

## 2022-01-07 ENCOUNTER — TELEPHONE (OUTPATIENT)
Dept: BARIATRICS/WEIGHT MGMT | Facility: CLINIC | Age: 42
End: 2022-01-07

## 2022-01-10 ENCOUNTER — TELEPHONE (OUTPATIENT)
Dept: BARIATRICS/WEIGHT MGMT | Facility: CLINIC | Age: 42
End: 2022-01-10

## 2022-01-10 RX ORDER — POTASSIUM CHLORIDE 20 MEQ/1
20 TABLET, EXTENDED RELEASE ORAL 2 TIMES DAILY
Qty: 60 TABLET | Refills: 1 | Status: SHIPPED | OUTPATIENT
Start: 2022-01-10 | End: 2022-01-14

## 2022-01-13 ENCOUNTER — LAB (OUTPATIENT)
Dept: LAB | Facility: HOSPITAL | Age: 42
End: 2022-01-13

## 2022-01-13 ENCOUNTER — TELEPHONE (OUTPATIENT)
Dept: BARIATRICS/WEIGHT MGMT | Facility: CLINIC | Age: 42
End: 2022-01-13

## 2022-01-13 DIAGNOSIS — R11.10 VOMITING, INTRACTABILITY OF VOMITING NOT SPECIFIED, PRESENCE OF NAUSEA NOT SPECIFIED, UNSPECIFIED VOMITING TYPE: Primary | ICD-10-CM

## 2022-01-13 DIAGNOSIS — R11.10 VOMITING, INTRACTABILITY OF VOMITING NOT SPECIFIED, PRESENCE OF NAUSEA NOT SPECIFIED, UNSPECIFIED VOMITING TYPE: ICD-10-CM

## 2022-01-13 DIAGNOSIS — R63.8 POOR FLUID INTAKE: ICD-10-CM

## 2022-01-13 LAB
ALBUMIN SERPL-MCNC: 4 G/DL (ref 3.5–5.2)
ALBUMIN/GLOB SERPL: 1.3 G/DL
ALP SERPL-CCNC: 118 U/L (ref 39–117)
ALT SERPL W P-5'-P-CCNC: 45 U/L (ref 1–33)
ANION GAP SERPL CALCULATED.3IONS-SCNC: 11 MMOL/L (ref 5–15)
AST SERPL-CCNC: 31 U/L (ref 1–32)
BASOPHILS # BLD AUTO: 0 10*3/MM3 (ref 0–0.2)
BASOPHILS NFR BLD AUTO: 0.7 % (ref 0–1.5)
BILIRUB SERPL-MCNC: 0.4 MG/DL (ref 0–1.2)
BUN SERPL-MCNC: 6 MG/DL (ref 6–20)
BUN/CREAT SERPL: 10 (ref 7–25)
CALCIUM SPEC-SCNC: 9.5 MG/DL (ref 8.6–10.5)
CHLORIDE SERPL-SCNC: 105 MMOL/L (ref 98–107)
CO2 SERPL-SCNC: 29 MMOL/L (ref 22–29)
CREAT SERPL-MCNC: 0.6 MG/DL (ref 0.57–1)
DEPRECATED RDW RBC AUTO: 42.9 FL (ref 37–54)
EOSINOPHIL # BLD AUTO: 0.1 10*3/MM3 (ref 0–0.4)
EOSINOPHIL NFR BLD AUTO: 2.2 % (ref 0.3–6.2)
ERYTHROCYTE [DISTWIDTH] IN BLOOD BY AUTOMATED COUNT: 14.5 % (ref 12.3–15.4)
GFR SERPL CREATININE-BSD FRML MDRD: 110 ML/MIN/1.73
GLOBULIN UR ELPH-MCNC: 3.1 GM/DL
GLUCOSE SERPL-MCNC: 84 MG/DL (ref 65–99)
HCT VFR BLD AUTO: 40.9 % (ref 34–46.6)
HGB BLD-MCNC: 14 G/DL (ref 12–15.9)
IRON 24H UR-MRATE: 52 MCG/DL (ref 37–145)
LYMPHOCYTES # BLD AUTO: 1.3 10*3/MM3 (ref 0.7–3.1)
LYMPHOCYTES NFR BLD AUTO: 22.4 % (ref 19.6–45.3)
MAGNESIUM SERPL-MCNC: 2 MG/DL (ref 1.6–2.6)
MCH RBC QN AUTO: 29 PG (ref 26.6–33)
MCHC RBC AUTO-ENTMCNC: 34.3 G/DL (ref 31.5–35.7)
MCV RBC AUTO: 84.6 FL (ref 79–97)
MONOCYTES # BLD AUTO: 0.4 10*3/MM3 (ref 0.1–0.9)
MONOCYTES NFR BLD AUTO: 7.2 % (ref 5–12)
NEUTROPHILS NFR BLD AUTO: 3.8 10*3/MM3 (ref 1.7–7)
NEUTROPHILS NFR BLD AUTO: 67.5 % (ref 42.7–76)
NRBC BLD AUTO-RTO: 0.1 /100 WBC (ref 0–0.2)
PHOSPHATE SERPL-MCNC: 2.2 MG/DL (ref 2.5–4.5)
PLATELET # BLD AUTO: 253 10*3/MM3 (ref 140–450)
PMV BLD AUTO: 8.7 FL (ref 6–12)
POTASSIUM SERPL-SCNC: 3 MMOL/L (ref 3.5–5.2)
PREALB SERPL-MCNC: 17.6 MG/DL (ref 20–40)
PROT SERPL-MCNC: 7.1 G/DL (ref 6–8.5)
RBC # BLD AUTO: 4.84 10*6/MM3 (ref 3.77–5.28)
SODIUM SERPL-SCNC: 145 MMOL/L (ref 136–145)
WBC NRBC COR # BLD: 5.7 10*3/MM3 (ref 3.4–10.8)

## 2022-01-13 PROCEDURE — 36415 COLL VENOUS BLD VENIPUNCTURE: CPT

## 2022-01-13 PROCEDURE — 84134 ASSAY OF PREALBUMIN: CPT

## 2022-01-13 PROCEDURE — 83540 ASSAY OF IRON: CPT

## 2022-01-13 PROCEDURE — 84425 ASSAY OF VITAMIN B-1: CPT

## 2022-01-13 PROCEDURE — 80053 COMPREHEN METABOLIC PANEL: CPT

## 2022-01-13 PROCEDURE — 83921 ORGANIC ACID SINGLE QUANT: CPT

## 2022-01-13 PROCEDURE — 85025 COMPLETE CBC W/AUTO DIFF WBC: CPT

## 2022-01-13 PROCEDURE — 83735 ASSAY OF MAGNESIUM: CPT

## 2022-01-13 PROCEDURE — 84100 ASSAY OF PHOSPHORUS: CPT

## 2022-01-13 RX ORDER — POTASSIUM CHLORIDE 1.5 G/1.77G
20 POWDER, FOR SOLUTION ORAL 2 TIMES DAILY
Qty: 60 EACH | Refills: 1 | Status: SHIPPED | OUTPATIENT
Start: 2022-01-13 | End: 2022-01-14

## 2022-01-13 NOTE — TELEPHONE ENCOUNTER
Patient concerned about K+ and intake of foods as she is not able to take potassium tablets they make her very sick.  Cannot eat everything makes her stomach hurt and then vomit. Do we want to send her to ACU for K+ as she had a critical of 3.0 at [facility] on 1/10

## 2022-01-14 ENCOUNTER — PREP FOR SURGERY (OUTPATIENT)
Dept: OTHER | Facility: HOSPITAL | Age: 42
End: 2022-01-14

## 2022-01-14 ENCOUNTER — OFFICE VISIT (OUTPATIENT)
Dept: BARIATRICS/WEIGHT MGMT | Facility: CLINIC | Age: 42
End: 2022-01-14

## 2022-01-14 VITALS
OXYGEN SATURATION: 97 % | SYSTOLIC BLOOD PRESSURE: 170 MMHG | HEART RATE: 57 BPM | WEIGHT: 293 LBS | BODY MASS INDEX: 47.09 KG/M2 | HEIGHT: 66 IN | RESPIRATION RATE: 18 BRPM | TEMPERATURE: 98.3 F | DIASTOLIC BLOOD PRESSURE: 99 MMHG

## 2022-01-14 DIAGNOSIS — E66.01 OBESITY, CLASS III, BMI 40-49.9 (MORBID OBESITY): Primary | ICD-10-CM

## 2022-01-14 PROCEDURE — 99024 POSTOP FOLLOW-UP VISIT: CPT | Performed by: SURGERY

## 2022-01-14 RX ORDER — SODIUM,POTASSIUM PHOSPHATES 280-250MG
1 POWDER IN PACKET (EA) ORAL
Qty: 120 PACKET | Refills: 0 | Status: SHIPPED | OUTPATIENT
Start: 2022-01-14

## 2022-01-14 RX ORDER — SODIUM CHLORIDE, SODIUM LACTATE, POTASSIUM CHLORIDE, CALCIUM CHLORIDE 600; 310; 30; 20 MG/100ML; MG/100ML; MG/100ML; MG/100ML
30 INJECTION, SOLUTION INTRAVENOUS CONTINUOUS
Status: CANCELLED | OUTPATIENT
Start: 2022-01-14

## 2022-01-14 NOTE — H&P (VIEW-ONLY)
[facility] BAR SURG [facility] BARIATRIC SURGERY  [address] [place] [address]  [address] [place] [address]  Dept: [phone]  1/14/2022      Kyle Pennington.  [id number]  [id number]  1980  female      Chief Complaint   Patient presents with   • Follow-up     Trouble eating, stomach pain, burning       BH Post-Op Bariatric Surgery:   Kyle Pennington is status post procedure listed above  HPI:     Wt Readings from Last 10 Encounters:   01/14/22 (!) 170 kg (374 lb 12.8 oz)   01/06/22 (!) 172 kg (379 lb 3.2 oz)   12/14/21 (!) 177 kg (390 lb 7 oz)   12/10/21 (!) 177 kg (390 lb 3.2 oz)   12/06/21 (!) 180 kg (396 lb 6.4 oz)   12/02/21 (!) 181 kg (398 lb 12.8 oz)   10/29/21 (!) 198 kg (435 lb 12.8 oz)   04/21/21 (!) 184 kg (406 lb)   03/10/21 (!) 184 kg (406 lb)   02/26/21 (!) 184 kg (406 lb 8 oz)      She can't hardly tolerated anything by mouth.  She is about 5 weeks status post gastric sleeve.  She says she used to be able to tolerate soft chicken but no longer can.  She is also having severe reflux.  She says that whenever she tries to drink a protein shake when it gets down to her stomach there is burning and it comes out.  She does not have a lot of nausea.  Her gastric sleeve was a revision from Lap-Band and she did have a lot of reflux prior to that.    She also had a postoperative hematoma of the right lower quadrant incision but that is healed and no longer giving her any problems.    She complains of diarrhea and had been on some antibiotics after she had the hematoma.  She is no longer on antibiotics.    Supplements: none.     Review of Systems   Constitutional: Positive for fatigue.   Gastrointestinal: Positive for diarrhea.       Patient Active Problem List   Diagnosis   • Morbid obesity (HCC)   • Advanced maternal age (AMA) in pregnancy   • Chronic hypertension in pregnancy   • History of laparoscopic adjustable gastric banding   • Twin  pregnancy in second trimester   • BMI 50.0-59.9, adult ([facility])   • Pre-operative examination   • GERD (gastroesophageal reflux disease)   • Iron deficiency anemia   • Iron malabsorption   • Anxiety   • Vaginal bleeding   • Anemia   • Depressive disorder   • Edema   • Endometrial hyperplasia   • Hypertension   • Shortness of breath   • Obesity, Class III, BMI 40-49.9 (morbid obesity) ([facility])       Past Medical History:   Diagnosis Date   • Anxiety and depression    • Arthritis    • Bipolar affective ([facility])    • Constipation    • Edema leg    • Fibrocystic breast    • GERD (gastroesophageal reflux disease)    • Hypertension    • Morbid obesity ([facility])        The following portions of the patient's history were reviewed and updated as appropriate: allergies, current medications, past family history, past medical history, past social history, past surgical history and problem list.    Vitals:    01/14/22 0852   BP: 170/99   Pulse: 57   Resp: 18   Temp: 98.3 °F (36.8 °C)   SpO2: 97%       Physical Exam  Awake and alert no distress normal pulmonary effort  Incisions healing well.  Right lower quadrant incision has no erythema and no fluctuance  Extremities nonedematous    Assessment:   Post-op, the patient is having dysphagia and reflux after gastric sleeve.  She has a long history of reflux in the past.  She did have a hiatal hernia repair.  I reviewed my operative note and there was a significant hiatal hernia defect.  2 figure-of-eight 0 Ethibond sutures were placed posteriorly and I also did place a gastropexy stitch of the posterior stomach to lower right isabella to aid in keeping the stomach fixed to the abdominal cavity.    Plan:     She is already taking a PPI.  I have recommended dual action complete.  For her diarrhea I have recommended a probiotic and also a fiber supplement.    She has low potassium and phosphorus and we have called in some granules of potassium and phosphorus for her.  She cannot seem to tolerate  Gatorade but she can tolerate water.  She describes a burning in her stomach.    We will plan for upper endoscopy to further evaluate for gastritis or stricture.

## 2022-01-16 ENCOUNTER — ANESTHESIA EVENT (OUTPATIENT)
Dept: GASTROENTEROLOGY | Facility: HOSPITAL | Age: 42
End: 2022-01-16

## 2022-01-17 ENCOUNTER — HOSPITAL ENCOUNTER (OUTPATIENT)
Facility: HOSPITAL | Age: 42
Setting detail: HOSPITAL OUTPATIENT SURGERY
Discharge: HOME | End: 2022-01-17
Attending: SURGERY | Admitting: SURGERY

## 2022-01-17 ENCOUNTER — ANESTHESIA (OUTPATIENT)
Dept: GASTROENTEROLOGY | Facility: HOSPITAL | Age: 42
End: 2022-01-17

## 2022-01-17 VITALS
BODY MASS INDEX: 47.09 KG/M2 | DIASTOLIC BLOOD PRESSURE: 89 MMHG | SYSTOLIC BLOOD PRESSURE: 154 MMHG | OXYGEN SATURATION: 100 % | HEIGHT: 66 IN | TEMPERATURE: 98.4 F | RESPIRATION RATE: 17 BRPM | HEART RATE: 64 BPM | WEIGHT: 293 LBS

## 2022-01-17 PROCEDURE — C1726 CATH, BAL DIL, NON-VASCULAR: HCPCS | Performed by: SURGERY

## 2022-01-17 PROCEDURE — 43245 EGD DILATE STRICTURE: CPT | Performed by: SURGERY

## 2022-01-17 PROCEDURE — 25010000002 PROPOFOL 10 MG/ML EMULSION

## 2022-01-17 RX ORDER — NALOXONE HCL 0.4 MG/ML
0.4 VIAL (ML) INJECTION AS NEEDED
Status: DISCONTINUED | OUTPATIENT
Start: 2022-01-17 | End: 2022-01-17 | Stop reason: HOSPADM

## 2022-01-17 RX ORDER — PROMETHAZINE HYDROCHLORIDE 25 MG/1
25 TABLET ORAL ONCE AS NEEDED
Status: DISCONTINUED | OUTPATIENT
Start: 2022-01-17 | End: 2022-01-17 | Stop reason: HOSPADM

## 2022-01-17 RX ORDER — ACETAMINOPHEN 325 MG/1
650 TABLET ORAL ONCE AS NEEDED
Status: DISCONTINUED | OUTPATIENT
Start: 2022-01-17 | End: 2022-01-17 | Stop reason: HOSPADM

## 2022-01-17 RX ORDER — FLUMAZENIL 0.1 MG/ML
0.2 INJECTION INTRAVENOUS AS NEEDED
Status: DISCONTINUED | OUTPATIENT
Start: 2022-01-17 | End: 2022-01-17 | Stop reason: HOSPADM

## 2022-01-17 RX ORDER — SODIUM CHLORIDE 9 MG/ML
50 INJECTION, SOLUTION INTRAVENOUS CONTINUOUS
Status: DISCONTINUED | OUTPATIENT
Start: 2022-01-17 | End: 2022-01-17 | Stop reason: HOSPADM

## 2022-01-17 RX ORDER — ACETAMINOPHEN 650 MG/1
650 SUPPOSITORY RECTAL ONCE AS NEEDED
Status: DISCONTINUED | OUTPATIENT
Start: 2022-01-17 | End: 2022-01-17 | Stop reason: HOSPADM

## 2022-01-17 RX ORDER — PROMETHAZINE HYDROCHLORIDE 25 MG/1
25 SUPPOSITORY RECTAL ONCE AS NEEDED
Status: DISCONTINUED | OUTPATIENT
Start: 2022-01-17 | End: 2022-01-17 | Stop reason: HOSPADM

## 2022-01-17 RX ORDER — SODIUM CHLORIDE 9 MG/ML
100 INJECTION, SOLUTION INTRAVENOUS CONTINUOUS
Status: DISCONTINUED | OUTPATIENT
Start: 2022-01-17 | End: 2022-01-17 | Stop reason: HOSPADM

## 2022-01-17 RX ORDER — LIDOCAINE HYDROCHLORIDE 20 MG/ML
INJECTION, SOLUTION EPIDURAL; INFILTRATION; INTRACAUDAL; PERINEURAL AS NEEDED
Status: DISCONTINUED | OUTPATIENT
Start: 2022-01-17 | End: 2022-01-17 | Stop reason: SURG

## 2022-01-17 RX ORDER — DEXAMETHASONE SODIUM PHOSPHATE 4 MG/ML
8 INJECTION, SOLUTION INTRA-ARTICULAR; INTRALESIONAL; INTRAMUSCULAR; INTRAVENOUS; SOFT TISSUE ONCE AS NEEDED
Status: DISCONTINUED | OUTPATIENT
Start: 2022-01-17 | End: 2022-01-17 | Stop reason: HOSPADM

## 2022-01-17 RX ORDER — ONDANSETRON 2 MG/ML
4 INJECTION INTRAMUSCULAR; INTRAVENOUS ONCE AS NEEDED
Status: DISCONTINUED | OUTPATIENT
Start: 2022-01-17 | End: 2022-01-17 | Stop reason: HOSPADM

## 2022-01-17 RX ORDER — SODIUM CHLORIDE, SODIUM LACTATE, POTASSIUM CHLORIDE, CALCIUM CHLORIDE 600; 310; 30; 20 MG/100ML; MG/100ML; MG/100ML; MG/100ML
30 INJECTION, SOLUTION INTRAVENOUS CONTINUOUS
Status: DISCONTINUED | OUTPATIENT
Start: 2022-01-17 | End: 2022-01-17 | Stop reason: HOSPADM

## 2022-01-17 RX ADMIN — PROPOFOL 125 MCG/KG/MIN: 10 INJECTION, EMULSION INTRAVENOUS at 07:35

## 2022-01-17 RX ADMIN — LIDOCAINE HYDROCHLORIDE 200 MG: 20 INJECTION, SOLUTION EPIDURAL; INFILTRATION; INTRACAUDAL; PERINEURAL at 07:34

## 2022-01-17 RX ADMIN — SODIUM CHLORIDE 50 ML/HR: 9 INJECTION, SOLUTION INTRAVENOUS at 06:52

## 2022-01-17 NOTE — OP NOTE
Surgeon:  Lyn Gan MD  Preoperative Diagnosis: Dysphagia after gastric sleeve  Postoperative Diagnosis: Stricture of gastric sleeve    Procedure Performed: Esophagogastroduodenoscopy with dilation of gastric sleeve    Indications: The patient had gastric sleeve about 1 month ago.  The patient had a distant history of Lap-Band and Lap-Band removal.  She also had a hiatal hernia repair and gastropexy at the time of her gastric sleeve.      Specimen: None    EBL: none    Procedure:     The procedure, indications, preparation and potential complications were explained to the patient, who indicated understanding and signed the corresponding consent forms.  The patient was identified, taken to the endoscopy suite, and placed on the left side down decubitus position.  The patient underwent a MAC anesthesia and was appropriately monitored through the case by the anesthesia personnel with continuous pulse oximetry, blood pressure, and cardiac monitoring.  A bite block was placed.  After adequate IV sedation and using a transoral technique a lubed flexible endoscope was placed in the hypopharynx and advanced to the second portion of the duodenum without difficulty. The scope was then withdrawn back into the antrum of the stomach.     There was no gastritis no ulcer.  There appeared to be a narrowing at the angularis incisura.  I was able to pass the scope easily through this area.  I used an 18 mm to 20 mm balloon and dilated the pylorus as well as the incisura and also an area above the incisura which coincided with the scope being about 35 cm at the incisors.  After this was performed the scope was removed.

## 2022-01-17 NOTE — DISCHARGE INSTRUCTIONS
A responsible adult should stay with you and you should rest quietly for the rest of the day.    Do not drink alcohol, drive, operate any heavy machinery or power tools or make any legal/important decisions for the next 24 hours.     Progress your diet as tolerated.  If you begin to experience severe pain, increased shortness of breath, racing heartbeat or a fever above 101 F, seek immediate medical attention.     Follow up with MD as instructed. Call office for results in 3 to 5 days if needed.    Make sure to take potassium pill when you get back home and stay on soft foods

## 2022-01-17 NOTE — ANESTHESIA PREPROCEDURE EVALUATION
Anesthesia Evaluation     NPO Solid Status: > 8 hours  NPO Liquid Status: > 8 hours           Airway   Mallampati: I  TM distance: >3 FB  Neck ROM: full  No difficulty expected  Dental - normal exam     Pulmonary - normal exam   (+) shortness of breath, sleep apnea on CPAP,   Cardiovascular - normal exam    (+) hypertension,       Neuro/Psych  (+) psychiatric history Bipolar,     GI/Hepatic/Renal/Endo    (+) morbid obesity,      Musculoskeletal     Abdominal  - normal exam    Bowel sounds: normal.   Substance History      OB/GYN          Other                        Anesthesia Plan    ASA 3     MAC   (BIPAP)  intravenous induction     Anesthetic plan, all risks, benefits, and alternatives have been provided, discussed and informed consent has been obtained with: patient.    Plan discussed with CAA and CRNA.

## 2022-01-17 NOTE — ANESTHESIA POSTPROCEDURE EVALUATION
Patient: Kyle Pennington    Procedure Summary     Date: 01/17/22 Room / Location: [facility] ENDOSCOPY 1 / [facility] ENDOSCOPY    Anesthesia Start: 0733 Anesthesia Stop: 0752    Procedure: ESOPHAGOGASTRODUODENOSCOPY WITH dilatation (18-20mm balloon) up to 20mm (N/A ) Diagnosis:       Body mass index (BMI) of 60.0-69.9 in adult (HCC)      (Body mass index (BMI) of 60.0-69.9 in adult (HCC) [Z68.44])    Surgeons: Lyn Gan MD Provider: Dani Benson MD    Anesthesia Type: MAC ASA Status: 3          Anesthesia Type: MAC    Vitals  Vitals Value Taken Time   /89 01/17/22 0804   Temp     Pulse 64 01/17/22 0804   Resp 17 01/17/22 0804   SpO2 100 % 01/17/22 0804           Post Anesthesia Care and Evaluation    Patient location during evaluation: PACU  Patient participation: complete - patient participated  Level of consciousness: awake  Pain scale: See nurse's notes for pain score.  Pain management: adequate  Airway patency: patent  Anesthetic complications: No anesthetic complications  PONV Status: none  Cardiovascular status: acceptable  Respiratory status: acceptable  Hydration status: acceptable    Comments: Patient seen and examined postoperatively; vital signs stable; SpO2 greater than or equal to 90%; cardiopulmonary status stable; nausea/vomiting adequately controlled; pain adequately controlled; no apparent anesthesia complications; patient discharged from anesthesia care when discharge criteria were met

## 2022-01-19 LAB — METHYLMALONATE SERPL-SCNC: 182 NMOL/L (ref 0–378)

## 2022-01-22 LAB — VIT B1 BLD-SCNC: 159.9 NMOL/L (ref 66.5–200)

## 2022-03-16 ENCOUNTER — APPOINTMENT (OUTPATIENT)
Dept: ONCOLOGY | Facility: CLINIC | Age: 42
End: 2022-03-16

## 2022-04-29 ENCOUNTER — LAB (OUTPATIENT)
Dept: LAB | Facility: HOSPITAL | Age: 42
End: 2022-04-29

## 2022-04-29 ENCOUNTER — OFFICE VISIT (OUTPATIENT)
Dept: BARIATRICS/WEIGHT MGMT | Facility: CLINIC | Age: 42
End: 2022-04-29

## 2022-04-29 VITALS
SYSTOLIC BLOOD PRESSURE: 167 MMHG | HEIGHT: 66 IN | OXYGEN SATURATION: 95 % | RESPIRATION RATE: 17 BRPM | HEART RATE: 71 BPM | WEIGHT: 293 LBS | DIASTOLIC BLOOD PRESSURE: 113 MMHG | BODY MASS INDEX: 47.09 KG/M2

## 2022-04-29 DIAGNOSIS — E66.01 OBESITY, CLASS III, BMI 40-49.9 (MORBID OBESITY): Primary | ICD-10-CM

## 2022-04-29 DIAGNOSIS — E66.01 OBESITY, CLASS III, BMI 40-49.9 (MORBID OBESITY): ICD-10-CM

## 2022-04-29 LAB
ALBUMIN SERPL-MCNC: 4 G/DL (ref 3.5–5.2)
ALBUMIN/GLOB SERPL: 1.2 G/DL
ALP SERPL-CCNC: 123 U/L (ref 39–117)
ALT SERPL W P-5'-P-CCNC: 47 U/L (ref 1–33)
ANION GAP SERPL CALCULATED.3IONS-SCNC: 12.5 MMOL/L (ref 5–15)
AST SERPL-CCNC: 47 U/L (ref 1–32)
BACTERIA UR QL AUTO: ABNORMAL /HPF
BASOPHILS # BLD AUTO: 0.04 10*3/MM3 (ref 0–0.2)
BASOPHILS NFR BLD AUTO: 0.6 % (ref 0–1.5)
BILIRUB SERPL-MCNC: 0.7 MG/DL (ref 0–1.2)
BILIRUB UR QL STRIP: NEGATIVE
BUN SERPL-MCNC: 9 MG/DL (ref 6–20)
BUN/CREAT SERPL: 12.5 (ref 7–25)
CALCIUM SPEC-SCNC: 9.9 MG/DL (ref 8.6–10.5)
CHLORIDE SERPL-SCNC: 97 MMOL/L (ref 98–107)
CLARITY UR: CLEAR
CO2 SERPL-SCNC: 30.5 MMOL/L (ref 22–29)
COLOR UR: ABNORMAL
CREAT SERPL-MCNC: 0.72 MG/DL (ref 0.57–1)
DEPRECATED RDW RBC AUTO: 39.6 FL (ref 37–54)
EGFRCR SERPLBLD CKD-EPI 2021: 106.5 ML/MIN/1.73
EOSINOPHIL # BLD AUTO: 0.17 10*3/MM3 (ref 0–0.4)
EOSINOPHIL NFR BLD AUTO: 2.8 % (ref 0.3–6.2)
ERYTHROCYTE [DISTWIDTH] IN BLOOD BY AUTOMATED COUNT: 13 % (ref 12.3–15.4)
GLOBULIN UR ELPH-MCNC: 3.4 GM/DL
GLUCOSE SERPL-MCNC: 116 MG/DL (ref 65–99)
GLUCOSE UR STRIP-MCNC: NEGATIVE MG/DL
HCT VFR BLD AUTO: 44.9 % (ref 34–46.6)
HGB BLD-MCNC: 14.9 G/DL (ref 12–15.9)
HGB UR QL STRIP.AUTO: NEGATIVE
HYALINE CASTS UR QL AUTO: ABNORMAL /LPF
IMM GRANULOCYTES # BLD AUTO: 0.01 10*3/MM3 (ref 0–0.05)
IMM GRANULOCYTES NFR BLD AUTO: 0.2 % (ref 0–0.5)
KETONES UR QL STRIP: NEGATIVE
LEUKOCYTE ESTERASE UR QL STRIP.AUTO: ABNORMAL
LYMPHOCYTES # BLD AUTO: 1.35 10*3/MM3 (ref 0.7–3.1)
LYMPHOCYTES NFR BLD AUTO: 21.9 % (ref 19.6–45.3)
MAGNESIUM SERPL-MCNC: 2.1 MG/DL (ref 1.6–2.6)
MCH RBC QN AUTO: 28.1 PG (ref 26.6–33)
MCHC RBC AUTO-ENTMCNC: 33.2 G/DL (ref 31.5–35.7)
MCV RBC AUTO: 84.6 FL (ref 79–97)
MONOCYTES # BLD AUTO: 0.41 10*3/MM3 (ref 0.1–0.9)
MONOCYTES NFR BLD AUTO: 6.7 % (ref 5–12)
NEUTROPHILS NFR BLD AUTO: 4.18 10*3/MM3 (ref 1.7–7)
NEUTROPHILS NFR BLD AUTO: 67.8 % (ref 42.7–76)
NITRITE UR QL STRIP: NEGATIVE
NRBC BLD AUTO-RTO: 0 /100 WBC (ref 0–0.2)
PH UR STRIP.AUTO: 6 [PH] (ref 5–8)
PHOSPHATE SERPL-MCNC: 3 MG/DL (ref 2.5–4.5)
PLATELET # BLD AUTO: 355 10*3/MM3 (ref 140–450)
PMV BLD AUTO: 10.7 FL (ref 6–12)
POTASSIUM SERPL-SCNC: 3 MMOL/L (ref 3.5–5.2)
PROT SERPL-MCNC: 7.4 G/DL (ref 6–8.5)
PROT UR QL STRIP: NEGATIVE
RBC # BLD AUTO: 5.31 10*6/MM3 (ref 3.77–5.28)
RBC # UR STRIP: ABNORMAL /HPF
REF LAB TEST METHOD: ABNORMAL
SODIUM SERPL-SCNC: 140 MMOL/L (ref 136–145)
SP GR UR STRIP: 1.02 (ref 1–1.03)
SQUAMOUS #/AREA URNS HPF: ABNORMAL /HPF
UROBILINOGEN UR QL STRIP: ABNORMAL
WBC # UR STRIP: ABNORMAL /HPF
WBC NRBC COR # BLD: 6.16 10*3/MM3 (ref 3.4–10.8)

## 2022-04-29 PROCEDURE — 81001 URINALYSIS AUTO W/SCOPE: CPT

## 2022-04-29 PROCEDURE — 36415 COLL VENOUS BLD VENIPUNCTURE: CPT

## 2022-04-29 PROCEDURE — 83735 ASSAY OF MAGNESIUM: CPT

## 2022-04-29 PROCEDURE — 87077 CULTURE AEROBIC IDENTIFY: CPT

## 2022-04-29 PROCEDURE — 85025 COMPLETE CBC W/AUTO DIFF WBC: CPT

## 2022-04-29 PROCEDURE — 84100 ASSAY OF PHOSPHORUS: CPT

## 2022-04-29 PROCEDURE — 83921 ORGANIC ACID SINGLE QUANT: CPT

## 2022-04-29 PROCEDURE — 82652 VIT D 1 25-DIHYDROXY: CPT

## 2022-04-29 PROCEDURE — 87186 SC STD MICRODIL/AGAR DIL: CPT

## 2022-04-29 PROCEDURE — 99213 OFFICE O/P EST LOW 20 MIN: CPT | Performed by: SURGERY

## 2022-04-29 PROCEDURE — 87086 URINE CULTURE/COLONY COUNT: CPT

## 2022-04-29 PROCEDURE — 80053 COMPREHEN METABOLIC PANEL: CPT

## 2022-04-29 PROCEDURE — 84425 ASSAY OF VITAMIN B-1: CPT

## 2022-04-29 NOTE — PROGRESS NOTES
[facility] BAR SURG [facility] BARIATRIC SURGERY  [address] [place] [address]  [address] [place] [address]  Dept: [phone]  4/29/2022      Kyle Pennington.  [id number]  [id number]  1980  female      Chief Complaint   Patient presents with   • Follow-up     4.5 mo [facility] Post-Op Bariatric Surgery:   Kyle Pennington is status post procedure listed above  HPI: s/p gastric sleeve 12/2, and had dilation 1/17 and this helped but still has trouble with chicken sticking at lower chest. She can eat turkey and ground beef. She can eat pork chop but not steak.     She complains of strange body odor. She takes a multivitamin and calcium pill. She drinks a protein shake 2-3 times a day.     Diet: salad, vinigarette- ana, tuna    Wt Readings from Last 10 Encounters:   04/29/22 (!) 152 kg (335 lb 3.2 oz)   01/17/22 (!) 171 kg (377 lb 12.8 oz)   01/14/22 (!) 170 kg (374 lb 12.8 oz)   01/06/22 (!) 172 kg (379 lb 3.2 oz)   12/14/21 (!) 177 kg (390 lb 7 oz)   12/10/21 (!) 177 kg (390 lb 3.2 oz)   12/06/21 (!) 180 kg (396 lb 6.4 oz)   12/02/21 (!) 181 kg (398 lb 12.8 oz)   10/29/21 (!) 198 kg (435 lb 12.8 oz)   04/21/21 (!) 184 kg (406 lb)      She goes to gym 4x per week. She works at a new job as .     Diet: lean protein, vegetables, protein shakes, bananas, fruit, raw veggies,       Review of Systems   Constitutional: Negative.    HENT: Negative.    Eyes: Negative.    Respiratory: Negative.    Cardiovascular: Negative.    Gastrointestinal: Negative.    Endocrine: Negative.    Genitourinary: Negative.    Musculoskeletal: Negative.    Skin: Negative.    Allergic/Immunologic: Negative.    Neurological: Negative.    Hematological: Negative.    Psychiatric/Behavioral: Negative.        Patient Active Problem List   Diagnosis   • Morbid obesity (HCC)   • Advanced maternal age (AMA) in pregnancy   • Chronic hypertension in pregnancy   •  History of laparoscopic adjustable gastric banding   • Twin pregnancy in second trimester   • BMI 50.0-59.9, adult ([facility])   • Pre-operative examination   • GERD (gastroesophageal reflux disease)   • Iron deficiency anemia   • Iron malabsorption   • Anxiety   • Vaginal bleeding   • Anemia   • Depressive disorder   • Edema   • Endometrial hyperplasia   • Hypertension   • Shortness of breath   • Obesity, Class III, BMI 40-49.9 (morbid obesity) ([facility])       Past Medical History:   Diagnosis Date   • Anxiety and depression    • Arthritis    • Bipolar affective ([facility])    • Constipation    • Edema leg    • Fibrocystic breast    • GERD (gastroesophageal reflux disease)    • Hypertension    • Morbid obesity ([facility])    • Sleep apnea     no machine       The following portions of the patient's history were reviewed and updated as appropriate: allergies, current medications, past family history, past medical history, past social history, past surgical history and problem list.    Vitals:    04/29/22 0904   BP: (!) 167/113   Pulse: 71   Resp: 17   SpO2: 95%       Physical Exam  Awake and alert  Normal mental status  Normal pulmonary effort  Abdomen appropriate tenderness  Incisions no erythema  Extremities no tenderness or swelling      Assessment:   Post-op, the patient is improved, and says the sleeve is better than the band even though she can't eat chicken and steak. Her weight is coming down. . She complains of body odor.     Plan:     Encouraged patient to be sure to get plenty of lean protein per day through small frequent meals all with a protein source.   Activity restrictions: none.   Recommended patient be sure to get at least 70 grams of protein per day by eating small, frequent meals all with high lean protein choices. Be sure to limit/cut back on daily carbohydrate intake. Discussed with the patient the recommended amount of water per day to intake- half of body weight in ounces. Reviewed vitamin requirements. Be sure  to do routine exercise, 150 minutes per week minimum, including both cardio and strength training.     Instructions / Recommendations: dietary counseling recommended, recommended a daily protein intake of  grams, vitamin supplement(s) recommended, recommended exercising at least 150 minutes per week, behavior modifications recommended and instructed to call the office for concerns, questions, or problems.     The patient was instructed to follow up in 3 months  .     The patient was counseled regarding. Total time spent face to face was 15 minutes and 15 minutes was spent counseling.

## 2022-05-01 LAB
1,25(OH)2D SERPL-MCNC: 63.9 PG/ML (ref 19.9–79.3)
BACTERIA SPEC AEROBE CULT: ABNORMAL

## 2022-05-06 LAB — METHYLMALONATE SERPL-SCNC: 205 NMOL/L (ref 0–378)

## 2022-05-10 RX ORDER — NITROFURANTOIN 25; 75 MG/1; MG/1
100 CAPSULE ORAL 2 TIMES DAILY
Qty: 14 CAPSULE | Refills: 0 | Status: SHIPPED | OUTPATIENT
Start: 2022-05-10

## 2022-05-10 RX ORDER — POTASSIUM CHLORIDE 1500 MG/1
20 TABLET, FILM COATED, EXTENDED RELEASE ORAL DAILY
Qty: 60 TABLET | Refills: 2 | Status: SHIPPED | OUTPATIENT
Start: 2022-05-10

## 2022-05-17 LAB — VIT B1 BLD-SCNC: 115 NMOL/L (ref 66.5–200)

## 2022-05-18 ENCOUNTER — TELEPHONE (OUTPATIENT)
Dept: BARIATRICS/WEIGHT MGMT | Facility: CLINIC | Age: 42
End: 2022-05-18

## 2022-05-18 NOTE — TELEPHONE ENCOUNTER
Spoke with Kellie pharmacist, gave verbal for K+ Klor Con that can be broken and/or dissolved in water/fluid.  Is an extended release tablet.  5/18/22 MN

## 2022-06-14 ENCOUNTER — TELEPHONE (OUTPATIENT)
Dept: BARIATRICS/WEIGHT MGMT | Facility: CLINIC | Age: 42
End: 2022-06-14

## 2022-06-14 DIAGNOSIS — K21.9 GASTROESOPHAGEAL REFLUX DISEASE WITHOUT ESOPHAGITIS: ICD-10-CM

## 2022-06-14 RX ORDER — OMEPRAZOLE 40 MG/1
40 CAPSULE, DELAYED RELEASE ORAL DAILY
Qty: 30 CAPSULE | Refills: 6 | Status: SHIPPED | OUTPATIENT
Start: 2022-06-14

## 2022-06-14 NOTE — TELEPHONE ENCOUNTER
Kyle called, requesting a refill of her Omeprazole. Pharmacy- [facility] in [place].

## 2022-10-21 NOTE — PROGRESS NOTES
[facility] BAR SURG [facility] BARIATRIC SURGERY  [address] [place] [address]  [address] [place] [address]  Dept: [phone]  1/14/2022      Kyle Pennington.  [id number]  [id number]  1980  female      Chief Complaint   Patient presents with   • Follow-up     Trouble eating, stomach pain, burning       BH Post-Op Bariatric Surgery:   Kyle Pennington is status post procedure listed above  HPI:     Wt Readings from Last 10 Encounters:   01/14/22 (!) 170 kg (374 lb 12.8 oz)   01/06/22 (!) 172 kg (379 lb 3.2 oz)   12/14/21 (!) 177 kg (390 lb 7 oz)   12/10/21 (!) 177 kg (390 lb 3.2 oz)   12/06/21 (!) 180 kg (396 lb 6.4 oz)   12/02/21 (!) 181 kg (398 lb 12.8 oz)   10/29/21 (!) 198 kg (435 lb 12.8 oz)   04/21/21 (!) 184 kg (406 lb)   03/10/21 (!) 184 kg (406 lb)   02/26/21 (!) 184 kg (406 lb 8 oz)      She can't hardly tolerated anything by mouth.  She is about 5 weeks status post gastric sleeve.  She says she used to be able to tolerate soft chicken but no longer can.  She is also having severe reflux.  She says that whenever she tries to drink a protein shake when it gets down to her stomach there is burning and it comes out.  She does not have a lot of nausea.  Her gastric sleeve was a revision from Lap-Band and she did have a lot of reflux prior to that.    She also had a postoperative hematoma of the right lower quadrant incision but that is healed and no longer giving her any problems.    She complains of diarrhea and had been on some antibiotics after she had the hematoma.  She is no longer on antibiotics.    Supplements: none.     Review of Systems   Constitutional: Positive for fatigue.   Gastrointestinal: Positive for diarrhea.       Patient Active Problem List   Diagnosis   • Morbid obesity (HCC)   • Advanced maternal age (AMA) in pregnancy   • Chronic hypertension in pregnancy   • History of laparoscopic adjustable gastric banding   • Twin  pregnancy in second trimester   • BMI 50.0-59.9, adult ([facility])   • Pre-operative examination   • GERD (gastroesophageal reflux disease)   • Iron deficiency anemia   • Iron malabsorption   • Anxiety   • Vaginal bleeding   • Anemia   • Depressive disorder   • Edema   • Endometrial hyperplasia   • Hypertension   • Shortness of breath   • Obesity, Class III, BMI 40-49.9 (morbid obesity) ([facility])       Past Medical History:   Diagnosis Date   • Anxiety and depression    • Arthritis    • Bipolar affective ([facility])    • Constipation    • Edema leg    • Fibrocystic breast    • GERD (gastroesophageal reflux disease)    • Hypertension    • Morbid obesity ([facility])        The following portions of the patient's history were reviewed and updated as appropriate: allergies, current medications, past family history, past medical history, past social history, past surgical history and problem list.    Vitals:    01/14/22 0852   BP: 170/99   Pulse: 57   Resp: 18   Temp: 98.3 °F (36.8 °C)   SpO2: 97%       Physical Exam  Awake and alert no distress normal pulmonary effort  Incisions healing well.  Right lower quadrant incision has no erythema and no fluctuance  Extremities nonedematous    Assessment:   Post-op, the patient is having dysphagia and reflux after gastric sleeve.  She has a long history of reflux in the past.  She did have a hiatal hernia repair.  I reviewed my operative note and there was a significant hiatal hernia defect.  2 figure-of-eight 0 Ethibond sutures were placed posteriorly and I also did place a gastropexy stitch of the posterior stomach to lower right isabella to aid in keeping the stomach fixed to the abdominal cavity.    Plan:     She is already taking a PPI.  I have recommended dual action complete.  For her diarrhea I have recommended a probiotic and also a fiber supplement.    She has low potassium and phosphorus and we have called in some granules of potassium and phosphorus for her.  She cannot seem to tolerate  Gatorade but she can tolerate water.  She describes a burning in her stomach.    We will plan for upper endoscopy to further evaluate for gastritis or stricture.   no

## 2022-12-23 RX ORDER — POTASSIUM CHLORIDE 20 MEQ/1
TABLET, EXTENDED RELEASE ORAL
Qty: 60 TABLET | Refills: 0 | OUTPATIENT
Start: 2022-12-23

## 2023-01-17 ENCOUNTER — TELEPHONE (OUTPATIENT)
Dept: BARIATRICS/WEIGHT MGMT | Facility: CLINIC | Age: 43
End: 2023-01-17
Payer: MEDICAID

## 2024-01-05 ENCOUNTER — HOSPITAL ENCOUNTER (EMERGENCY)
Facility: HOSPITAL | Age: 44
Discharge: HOME | End: 2024-01-05
Attending: EMERGENCY MEDICINE
Payer: MEDICAID

## 2024-01-05 VITALS
RESPIRATION RATE: 18 BRPM | BODY MASS INDEX: 47.09 KG/M2 | HEART RATE: 66 BPM | DIASTOLIC BLOOD PRESSURE: 88 MMHG | HEIGHT: 66 IN | OXYGEN SATURATION: 98 % | SYSTOLIC BLOOD PRESSURE: 124 MMHG | TEMPERATURE: 97.9 F | WEIGHT: 293 LBS

## 2024-01-05 DIAGNOSIS — K64.9 HEMORRHOIDS, UNSPECIFIED HEMORRHOID TYPE: Primary | ICD-10-CM

## 2024-01-05 PROCEDURE — 99282 EMERGENCY DEPT VISIT SF MDM: CPT

## 2024-01-05 RX ORDER — HYDROCORTISONE ACETATE 25 MG/1
25 SUPPOSITORY RECTAL 2 TIMES DAILY
Qty: 10 SUPPOSITORY | Refills: 0 | Status: SHIPPED | OUTPATIENT
Start: 2024-01-05

## 2024-01-06 NOTE — ED PROVIDER NOTES
Subjective   History of Present Illness  43-year-old female presents with complaints of hemorrhoids.  She states they have been bleeding some and become painful.  She states she does not have constipation.  She does complain of pain with bowel movement.  She has no abdominal pain.  She is on no blood thinners.  She states she has had problems with hemorrhoids since her pregnancy.  She states she had gone to [facility] previously had been prescribed Proctofoam but could not get it due to insurance not covering it.  Review of Systems    Past Medical History:   Diagnosis Date    Anxiety and depression     Arthritis     Bipolar affective     Constipation     Edema leg     Fibrocystic breast     GERD (gastroesophageal reflux disease)     Hypertension     Morbid obesity     Sleep apnea     no machine       Allergies   Allergen Reactions    Antihistamines, Diphenhydramine-Type Other (See Comments)     Bloody nose       Past Surgical History:   Procedure Laterality Date    ABDOMINAL SURGERY       SECTION      COLONOSCOPY N/A 2020    Procedure: COLONOSCOPY to cecum and TI with cold snare polypectomy;  Surgeon: Giovanni Lara MD;  Location: [facility] ENDOSCOPY;  Service: Gastroenterology;  Laterality: N/A;  pre-iron def anemia  post-polyp    D & C CERVICAL BIOPSY      D & C HYSTEROSCOPY N/A 2021    Procedure: DILATATION AND CURETTAGE HYSTEROSCOPY;  Surgeon: Power Vasquez MD;  Location: [facility] MAIN OR;  Service: Gynecology;  Laterality: N/A;    ENDOSCOPY N/A 8/15/2019    Procedure: ESOPHAGOGASTRODUODENOSCOPY W/BIOPSY;  Surgeon: yLn Gan MD;  Location: [facility] ENDOSCOPY;  Service: General    ENDOSCOPY N/A 2020    Procedure: ESOPHAGOGASTRODUODENOSCOPY;  Surgeon: Giovanni Lara MD;  Location: [facility] ENDOSCOPY;  Service: Gastroenterology;  Laterality: N/A;  pre-iron def anemia  post-hiatal hernia    ENDOSCOPY N/A 2022    Procedure: ESOPHAGOGASTRODUODENOSCOPY WITH  dilatation (18-20mm balloon) up to 20mm;  Surgeon: Lyn Gan MD;  Location: [facility] ENDOSCOPY;  Service: General;  Laterality: N/A;  post op: stricture of stomach    GASTRIC BANDING REMOVAL N/A 10/17/2019    Procedure: GASTRIC BANDING REMOVAL LAPAROSCOPIC;  Surgeon: Lyn Gan MD;  Location: [facility] MAIN OR;  Service: General    GASTRIC SLEEVE LAPAROSCOPIC N/A 2021    Procedure: GASTRIC SLEEVE LAPAROSCOPIC WITH DAVINCI ROBOT;  Surgeon: Lyn Gan MD;  Location: [facility] MAIN OR;  Service: Robotics - DaVinci;  Laterality: N/A;    HIATAL HERNIA REPAIR N/A 2021    Procedure: LAPAROSCOPIC HIATAL HERNIA REPAIR WITH DAVINCI ROBOT;  Surgeon: Lyn Gan MD;  Location: [facility] MAIN OR;  Service: Robotics - DaVinci;  Laterality: N/A;    HYSTERECTOMY      LAPAROSCOPIC GASTRIC BANDING      LAPAROSCOPIC LYSIS OF ADHESIONS N/A 2021    Procedure: extensive LAPAROSCOPIC LYSIS OF ADHESIONS;  Surgeon: Lyn Gan MD;  Location: [facility] MAIN OR;  Service: Robotics - DaVinci;  Laterality: N/A;    LAPAROSCOPIC TUBAL LIGATION         Family History   Problem Relation Age of Onset    Hypertension Mother     Cancer Mother     Skin cancer Mother     Hypertension Father     Heart attack Father     Hypertension Sister     Diabetes Sister     Cancer Maternal Grandmother     Lung cancer Maternal Grandmother     Cancer Maternal Grandfather        Social History     Socioeconomic History    Marital status: Single   Tobacco Use    Smoking status: Former     Types: Cigarettes     Quit date:      Years since quittin.0    Smokeless tobacco: Never   Vaping Use    Vaping Use: Never used   Substance and Sexual Activity    Alcohol use: Yes     Alcohol/week: 2.0 standard drinks of alcohol     Types: 2 Glasses of wine per week     Comment: 2 drinks per week, social     Drug use: No    Sexual activity: Defer     Prior to Admission medications    Medication Sig Start Date End Date Taking? Authorizing Provider   buPROPion XL  (WELLBUTRIN XL) 150 MG 24 hr tablet Take 150 mg by mouth Every Morning. Take dos 10/28/20   Darrell Tuttle MD   chlorthalidone (HYGROTON) 25 MG tablet Take 25 mg by mouth Daily. Not taking 11/12/21   Drarell Tuttle MD   HYDROmorphone (Dilaudid) 2 MG tablet Take 1 tablet by mouth Every 6 (Six) Hours As Needed for Severe Pain .  Patient taking differently: Take 2 mg by mouth Every 6 (Six) Hours As Needed for Severe Pain . Not taking 12/3/21   Lyn Gan MD   Multiple Vitamins-Calcium (ONE-A-DAY WOMENS PO) Take 1 tablet by mouth Daily. LD 1/14    Darrell Tuttle MD   nebivolol (BYSTOLIC) 10 MG tablet Take 10 mg by mouth Every Morning. Take dos    Darrell Tuttle MD   nitrofurantoin, macrocrystal-monohydrate, (Macrobid) 100 MG capsule Take 1 capsule by mouth 2 (Two) Times a Day. 5/10/22   Claudia Regan APRN   omeprazole (priLOSEC) 40 MG capsule Take 1 capsule by mouth Daily. 6/14/22   Claudia Regan APRN   ondansetron ODT (Zofran ODT) 8 MG disintegrating tablet Place 1 tablet on the tongue Every 8 (Eight) Hours As Needed for Nausea or Vomiting. 12/3/21   Lyn Gan MD   potassium & sodium phosphates (Phosphorus Supplement) 280-160-250 MG pack packet Take 1 packet by mouth 2 (Two) Times a Day Before Meals. 1/14/22   Claudia Regan APRN   potassium chloride ER (K-TAB) 20 MEQ tablet controlled-release ER tablet Take 1 tablet by mouth Daily. 5/10/22   Claudia Regan APRN   spironolactone (ALDACTONE) 25 MG tablet Take 1 tablet by mouth Daily. Not taking for now 11/29/21   Darrell Tuttle MD   ursodiol (ACTIGALL) 250 MG tablet Take 1 tablet by mouth 2 (Two) Times a Day. 11/5/21   Claudia Regan APRN           Objective   Physical Exam  43-year-old female awake alert.  Generally obese.  Abdomen soft nontender.  Examination anal area reveals nonthrombosed hemorrhoids.  There is no surrounding induration.  Procedures           ED Course                                              Medical Decision Making    Patient's findings were discussed with her.  There is no evidence of thrombosed hemorrhoids that would benefit from I&D.  She was discharged placed on Anusol HC.  She was given referral to gastroenterologist and to follow-up with Dr. Gan.  Return new or worsening symptoms  Final diagnoses:   Hemorrhoids, unspecified hemorrhoid type       ED Disposition  ED Disposition       ED Disposition   Discharge    Condition   Stable    Comment   --               [facility]  [address] [facility] [address] [phone]        Lyn Gan MD  [address] [place] [address] [place] [facility]  [phone]               Medication List        New Prescriptions      hydrocortisone 25 MG suppository  Commonly known as: ANUSOL-HC  Insert 1 suppository into the rectum 2 (Two) Times a Day.            Changed      HYDROmorphone 2 MG tablet  Commonly known as: Dilaudid  Take 1 tablet by mouth Every 6 (Six) Hours As Needed for Severe Pain .  What changed: additional instructions               Where to Get Your Medications        These medications were sent to [facility] - [place] - [address] [facility] [address]  - [phone] [place] [phone]   [address]  [place] [address]      Phone: [phone]   hydrocortisone 25 MG suppository            Justyn Preciado MD  01/05/24 4087

## 2024-01-06 NOTE — DISCHARGE INSTRUCTIONS
Follow-up with surgeon or gastroenterologist for further evaluation.  May use warm sits baths.  Return new or worsening problems

## (undated) DEVICE — 40580 - THE PINK PAD - ADVANCED TRENDELENBURG POSITIONING KIT: Brand: 40580 - THE PINK PAD - ADVANCED TRENDELENBURG POSITIONING KIT

## (undated) DEVICE — KT SURG TURNOVER 050

## (undated) DEVICE — SUT MONOCRYL 4/0 PS2 27IN Y426H ETY426H

## (undated) DEVICE — PAPR PRNT PK SONY W RIBN UPC55

## (undated) DEVICE — PASS SUT PRO BARIATRIC XL W/TROC SWABS

## (undated) DEVICE — 9165 UNIVERSAL PATIENT PLATE: Brand: 3M™

## (undated) DEVICE — APPL CHLORAPREP HI/LITE 26ML ORNG

## (undated) DEVICE — SOL IRRIG H2O 1000ML STRL

## (undated) DEVICE — 40583 XL ADVANCED TRENDELENBURG POSITIONING KIT: Brand: 40583 XL ADVANCED TRENDELENBURG POSITIONING KIT

## (undated) DEVICE — PK BARIATRIC 50

## (undated) DEVICE — GLV SURG SENSICARE PI LF PF 8 GRN STRL

## (undated) DEVICE — GLV SURG SIGNATURE ESSENTIAL PF LTX SZ7.5

## (undated) DEVICE — TP SXN YANKR BULB STRL

## (undated) DEVICE — CANNULA SEAL

## (undated) DEVICE — BITEBLOCK ENDO W/STRAP 60F A/ LF DISP

## (undated) DEVICE — NDL HYPO PRECISIONGLIDE/REG 18G 11/2 PNK

## (undated) DEVICE — PENCL HND ROCKRSWTCH HOLSTR EZ CLEAN TP CRD 10FT

## (undated) DEVICE — PROVE COVER: Brand: UNBRANDED

## (undated) DEVICE — ERBE NESSY®PLATE 170 SPLIT; 168CM²; CABLE 3M: Brand: ERBE

## (undated) DEVICE — PK ENDO GI 50

## (undated) DEVICE — UNIVERSAL PLUS MULTIFUNCTION INSTRUMENT SYSTEM (STRAIGHT GRIP HANDLE), STRAIGHT HANDLE ELECTROSURGICAL SUCTION/IRRIGATION INSTRUMENT WITH FOOT CONTROLLED ELECTROSURGERY: Brand: UNIVERSAL PLUS

## (undated) DEVICE — SYR LUERLOK 30CC

## (undated) DEVICE — LAPAROSCOPIC GAS CONDITIONING DEVICE.: Brand: INSUFLOW

## (undated) DEVICE — APPL HEMO SURG ARISTA/AH/FLEXITIP XL 38CM

## (undated) DEVICE — SOL NACL 0.9PCT 1000ML

## (undated) DEVICE — PK PROC TURNOVER

## (undated) DEVICE — ENDOPATH XCEL BLADELESS TROCARS WITH STABILITY SLEEVES: Brand: ENDOPATH XCEL

## (undated) DEVICE — VESSEL SEALER EXTEND: Brand: ENDOWRIST

## (undated) DEVICE — UNDRGLV SURG BIOGEL PIMICROINDICATOR SYNTH SZ8 LF STRL

## (undated) DEVICE — IRRIGATOR TOOMEY 70CC

## (undated) DEVICE — 3M™ IOBAN™ 2 ANTIMICROBIAL INCISE DRAPE 6650EZ: Brand: IOBAN™ 2

## (undated) DEVICE — CUFF SCD HEMOFORCE SEQ CALF STD MD

## (undated) DEVICE — DEV INFL CRE STERIFLATE 60CC DISP

## (undated) DEVICE — SINGLE-USE BIOPSY FORCEPS: Brand: RADIAL JAW 4

## (undated) DEVICE — SUT VIC 0/0 UR6 27IN DYED J603H

## (undated) DEVICE — SNAR POLYP CAPTIVATOR2 RND STFF 2.4 25MM 240CM

## (undated) DEVICE — DECANTER: Brand: UNBRANDED

## (undated) DEVICE — 1000ML,PRESSURE INFUSER W/STOPCOCK: Brand: MEDLINE

## (undated) DEVICE — C-ARM: Brand: UNBRANDED

## (undated) DEVICE — ENDOPATH XCEL WITH OPTIVIEW TECHNOLOGY BLADELESS TROCARS WITH STABILITY SLEEVES: Brand: ENDOPATH XCEL OPTIVIEW

## (undated) DEVICE — PISTON SYRINGE,IRRIGATION WITH PROTECTIVE CAP: Brand: DOVER

## (undated) DEVICE — SLV SCD CALF HEMOFORCE DVT THERP REPROC MD

## (undated) DEVICE — ELECTRD LP CUT 24/26F YEL

## (undated) DEVICE — ARM DRAPE

## (undated) DEVICE — ST TBG ENDOMAT HYSTSCPY

## (undated) DEVICE — PK MINOR LITHOTOMY 50

## (undated) DEVICE — UNIVERSAL PLUS LAPAROSCOPIC ELECTRODE WITH ESCHAR-RESISTANT COATING - SUCTION/IRRIGATION, L-HOOK 5 MM X 44 CM: Brand: UNIVERSAL PLUS

## (undated) DEVICE — COLUMN DRAPE

## (undated) DEVICE — NDL HYPO PRECISIONGLIDE/REG 22 G 1IN BLK

## (undated) DEVICE — SEAL

## (undated) DEVICE — SKIN AFFIX SURG ADHESIVE 72/CS 0.55ML: Brand: MEDLINE

## (undated) DEVICE — SYR LUERLOK 50ML

## (undated) DEVICE — TRAP WIDEEYE POLYP

## (undated) DEVICE — BLADELESS OBTURATOR: Brand: WECK VISTA

## (undated) DEVICE — SOL IRR H2O BTL 1000ML STRL

## (undated) DEVICE — Device: Brand: STANDARD BOUGIE, 38FR

## (undated) DEVICE — LAPAROSCOPIC SMOKE ELIMINATION DEVICE: Brand: PNEUVIEW XE

## (undated) DEVICE — 2, DISPOSABLE SUCTION/IRRIGATOR WITH DISPOSABLE TIP: Brand: STRYKEFLOW

## (undated) DEVICE — TUBING, SUCTION, 1/4" X 12', STRAIGHT: Brand: MEDLINE

## (undated) DEVICE — TROCAR: Brand: KII OPTICAL ACCESS SYSTEM

## (undated) DEVICE — SPNG LAP PREWSH SFTPK 18X18IN STRL PK/5

## (undated) DEVICE — TROC STANDARDTROCAR FOR/TITANSGS 19MM DISP STRL

## (undated) DEVICE — ESOPHAGEAL BALLOON DILATATION CATHETER: Brand: CRE FIXED WIRE

## (undated) DEVICE — TBG IRRI TUR Y/TYP NONVENT 98IN LF

## (undated) DEVICE — GLV SURG TRIUMPH LT PF LTX 7.5 STRL

## (undated) DEVICE — REDUCER: Brand: ENDOWRIST

## (undated) DEVICE — COVER,MAYO STAND,STERILE: Brand: MEDLINE

## (undated) DEVICE — ENDOPATH XCEL WITH OPTIVIEW TECHNOLOGY UNIVERSAL TROCAR STABILITY SLEEVES: Brand: ENDOPATH XCEL OPTIVIEW

## (undated) DEVICE — GLV SURG TRIUMPH GREEN W/ALOE PF LTX 8 STRL

## (undated) DEVICE — SOL IRRIG NACL 1000ML

## (undated) DEVICE — ADHS SKIN PREMIERPRO EXOFIN TOPICAL HI/VISC .5ML

## (undated) DEVICE — INTENDED FOR TISSUE SEPARATION, AND OTHER PROCEDURES THAT REQUIRE A SHARP SURGICAL BLADE TO PUNCTURE OR CUT.: Brand: BARD-PARKER ® CARBON RIB-BACK BLADES

## (undated) DEVICE — SUT ETHIB EXCL 0/0 36IN ETX524H